# Patient Record
Sex: FEMALE | Race: WHITE | NOT HISPANIC OR LATINO | Employment: OTHER | URBAN - METROPOLITAN AREA
[De-identification: names, ages, dates, MRNs, and addresses within clinical notes are randomized per-mention and may not be internally consistent; named-entity substitution may affect disease eponyms.]

---

## 2017-06-15 DIAGNOSIS — Z12.31 ENCOUNTER FOR SCREENING MAMMOGRAM FOR BREAST CANCER: Primary | ICD-10-CM

## 2017-08-14 ENCOUNTER — HOSPITAL ENCOUNTER (OUTPATIENT)
Dept: RADIOLOGY | Facility: HOSPITAL | Age: 67
Discharge: HOME | End: 2017-08-14
Attending: NURSE PRACTITIONER
Payer: MEDICARE

## 2017-08-14 DIAGNOSIS — M79.672 LEFT FOOT PAIN: ICD-10-CM

## 2017-08-14 DIAGNOSIS — M79.672 LEFT FOOT PAIN: Primary | ICD-10-CM

## 2017-08-14 PROCEDURE — 73630 X-RAY EXAM OF FOOT: CPT | Mod: 26,LT,, | Performed by: RADIOLOGY

## 2017-08-14 PROCEDURE — 73630 X-RAY EXAM OF FOOT: CPT | Mod: TC,PO,LT

## 2018-01-18 DIAGNOSIS — N90.4 LICHEN SCLEROSUS ET ATROPHICUS OF THE VULVA: ICD-10-CM

## 2018-01-18 RX ORDER — CLOBETASOL PROPIONATE 0.5 MG/G
OINTMENT TOPICAL 2 TIMES DAILY
Qty: 60 G | Refills: 1 | Status: SHIPPED | OUTPATIENT
Start: 2018-01-18 | End: 2018-01-28

## 2018-04-05 ENCOUNTER — HOSPITAL ENCOUNTER (OUTPATIENT)
Dept: RADIOLOGY | Facility: HOSPITAL | Age: 68
Discharge: HOME | End: 2018-04-05
Attending: INTERNAL MEDICINE
Payer: MEDICARE

## 2018-04-05 DIAGNOSIS — Z01.818 PREOP EXAMINATION: Primary | ICD-10-CM

## 2018-04-05 DIAGNOSIS — Z01.818 PREOP EXAMINATION: ICD-10-CM

## 2018-04-05 PROCEDURE — 71046 X-RAY EXAM CHEST 2 VIEWS: CPT | Mod: 26,,, | Performed by: RADIOLOGY

## 2018-04-05 PROCEDURE — 71046 X-RAY EXAM CHEST 2 VIEWS: CPT | Mod: TC,PO

## 2018-06-06 ENCOUNTER — HOSPITAL ENCOUNTER (OUTPATIENT)
Dept: RADIOLOGY | Facility: HOSPITAL | Age: 68
Discharge: HOME | End: 2018-06-06
Attending: INTERNAL MEDICINE
Payer: MEDICARE

## 2018-06-06 DIAGNOSIS — M25.561 ACUTE PAIN OF RIGHT KNEE: ICD-10-CM

## 2018-06-06 DIAGNOSIS — M25.561 ACUTE PAIN OF RIGHT KNEE: Primary | ICD-10-CM

## 2018-06-06 PROCEDURE — 73562 X-RAY EXAM OF KNEE 3: CPT | Mod: 26,50,, | Performed by: RADIOLOGY

## 2018-06-06 PROCEDURE — 73562 X-RAY EXAM OF KNEE 3: CPT | Mod: TC,50,PO

## 2018-10-31 ENCOUNTER — HOSPITAL ENCOUNTER (OUTPATIENT)
Dept: RADIOLOGY | Facility: HOSPITAL | Age: 68
Discharge: HOME | End: 2018-10-31
Attending: INTERNAL MEDICINE
Payer: MEDICARE

## 2018-10-31 DIAGNOSIS — S49.92XA ARM INJURY, LEFT, INITIAL ENCOUNTER: ICD-10-CM

## 2018-10-31 PROCEDURE — 73090 X-RAY EXAM OF FOREARM: CPT | Mod: TC,PO,LT

## 2018-10-31 PROCEDURE — 73110 X-RAY EXAM OF WRIST: CPT | Mod: 26,LT,, | Performed by: RADIOLOGY

## 2018-10-31 PROCEDURE — 73090 X-RAY EXAM OF FOREARM: CPT | Mod: 26,LT,, | Performed by: RADIOLOGY

## 2018-10-31 PROCEDURE — 73110 X-RAY EXAM OF WRIST: CPT | Mod: TC,PO,LT

## 2018-10-31 PROCEDURE — 73080 X-RAY EXAM OF ELBOW: CPT | Mod: 26,LT,, | Performed by: RADIOLOGY

## 2018-10-31 PROCEDURE — 73080 X-RAY EXAM OF ELBOW: CPT | Mod: TC,PO,LT

## 2018-11-01 ENCOUNTER — TELEPHONE (OUTPATIENT)
Dept: OBSTETRICS AND GYNECOLOGY | Facility: CLINIC | Age: 68
End: 2018-11-01

## 2018-11-01 NOTE — TELEPHONE ENCOUNTER
Returned call to patient.  She is requesting a mammogram order.  Informed patient that she is due for a well woman exam and she could be given mammogram orders at that appointment, she verbalized understanding.  Encouraged her to schedule an appointment, she verbalized understanding and asked that appointment be scheduled.  Appointment scheduled for 12/17/18 at 9:00 am, appointment confirmed by patient.

## 2018-11-15 ENCOUNTER — TELEPHONE (OUTPATIENT)
Dept: OBSTETRICS AND GYNECOLOGY | Facility: CLINIC | Age: 68
End: 2018-11-15

## 2018-11-15 NOTE — TELEPHONE ENCOUNTER
Spoke to patient. Advised patient that Dr. Maria will be starting clinic later that morning due to surgery. Assisted patient with r/s appointment to 9:45. Patient verbalized understanding.

## 2018-12-17 ENCOUNTER — HOSPITAL ENCOUNTER (OUTPATIENT)
Dept: RADIOLOGY | Facility: HOSPITAL | Age: 68
Discharge: HOME | End: 2018-12-17
Attending: OBSTETRICS & GYNECOLOGY
Payer: MEDICARE

## 2018-12-17 VITALS — BODY MASS INDEX: 27.89 KG/M2 | HEIGHT: 68 IN | WEIGHT: 184 LBS

## 2018-12-17 DIAGNOSIS — Z12.39 BREAST CANCER SCREENING: Primary | ICD-10-CM

## 2018-12-17 DIAGNOSIS — Z12.39 BREAST CANCER SCREENING: ICD-10-CM

## 2018-12-17 PROCEDURE — 77063 BREAST TOMOSYNTHESIS BI: CPT | Mod: 26,,, | Performed by: RADIOLOGY

## 2018-12-17 PROCEDURE — 77067 SCR MAMMO BI INCL CAD: CPT | Mod: TC,PO

## 2018-12-17 PROCEDURE — 77063 BREAST TOMOSYNTHESIS BI: CPT | Mod: TC,PO

## 2018-12-17 PROCEDURE — 77067 SCR MAMMO BI INCL CAD: CPT | Mod: 26,,, | Performed by: RADIOLOGY

## 2018-12-30 ENCOUNTER — HOSPITAL ENCOUNTER (EMERGENCY)
Facility: HOSPITAL | Age: 68
Discharge: HOME | End: 2018-12-30
Attending: EMERGENCY MEDICINE
Payer: MEDICARE

## 2018-12-30 VITALS
DIASTOLIC BLOOD PRESSURE: 72 MMHG | SYSTOLIC BLOOD PRESSURE: 140 MMHG | WEIGHT: 163.38 LBS | BODY MASS INDEX: 24.76 KG/M2 | HEART RATE: 84 BPM | HEIGHT: 68 IN | RESPIRATION RATE: 18 BRPM | TEMPERATURE: 98 F | OXYGEN SATURATION: 97 %
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
## 2018-12-30 DIAGNOSIS — M25.552 HIP PAIN, LEFT: Primary | ICD-10-CM

## 2018-12-30 PROCEDURE — 99283 EMERGENCY DEPT VISIT LOW MDM: CPT

## 2018-12-30 RX ORDER — OXYCODONE AND ACETAMINOPHEN 5; 325 MG/1; MG/1
1 TABLET ORAL EVERY 4 HOURS PRN
Qty: 10 TABLET | Refills: 0 | Status: SHIPPED | OUTPATIENT
Start: 2018-12-30

## 2018-12-30 NOTE — ED NOTES
Patient sitting on ER stretcher. Patient denies pain at present. +pulses, + sensation to BLE. BBSCTA, skin warm and dry resp even and unlabored. Patient has an abrasion to left 4th digit and bruising to right knee. Pain to left knee and hip. Abd soft and non tender on palpation. Patient reports she can ambulate but does not like pain. Family at bedside Patient awaiting xray read and disposition. NAD noted Will continue to monitor.

## 2018-12-30 NOTE — ED PROVIDER NOTES
Encounter Date: 12/30/2018       History     Chief Complaint   Patient presents with    Hip Pain     left hip fx repair recent, fell 2 nights ago when left knee gave out and concerned with hip pain     The history is provided by the patient.   Hip Pain   This is a new problem. The current episode started 2 days ago. The problem occurs constantly. The problem has not changed since onset.Pertinent negatives include no chest pain, no abdominal pain, no headaches and no shortness of breath. The symptoms are aggravated by bending, twisting and walking. The symptoms are relieved by rest. She has tried rest for the symptoms. The treatment provided mild relief.     Review of patient's allergies indicates:  No Known Allergies  Past Medical History:   Diagnosis Date    Abnormal Pap smear of vagina     Anxiety     Anxiety and depression     Cancer     tongue    Mental disorder     Scoliosis      Past Surgical History:   Procedure Laterality Date    BLADDER SURGERY      CERVICAL SPINE SURGERY      feet Bilateral     HYSTERECTOMY      KNEE SURGERY      OOPHORECTOMY       Family History   Problem Relation Age of Onset    Heart disease Mother     Diabetes Mother     Hypertension Sister     Breast cancer Maternal Aunt     Cancer Maternal Uncle     Colon cancer Maternal Uncle     Hypertension Sister     Hypertension Daughter     Stroke Daughter     Hypertension Daughter     Ovarian cancer Neg Hx      Social History     Tobacco Use    Smoking status: Never Smoker    Smokeless tobacco: Never Used   Substance Use Topics    Alcohol use: No     Alcohol/week: 0.0 oz    Drug use: No     Review of Systems   Respiratory: Negative for shortness of breath.    Cardiovascular: Negative for chest pain.   Gastrointestinal: Negative for abdominal pain.   Musculoskeletal:        Left Hip Pain   Neurological: Negative for headaches.   All other systems reviewed and are negative.      Physical Exam     Initial Vitals  [12/30/18 1139]   BP Pulse Resp Temp SpO2   (!) 144/69 85 18 97.4 °F (36.3 °C) 96 %      MAP       --         Physical Exam    Nursing note and vitals reviewed.  Constitutional: She appears well-developed and well-nourished. No distress.   HENT:   Head: Normocephalic and atraumatic.   Mouth/Throat: Oropharynx is clear and moist.   Eyes: Conjunctivae and EOM are normal. Pupils are equal, round, and reactive to light.   Neck: Normal range of motion. Neck supple.   Cardiovascular: Normal rate, regular rhythm and normal heart sounds.   Pulmonary/Chest: Breath sounds normal. No respiratory distress.   Abdominal: Soft. Bowel sounds are normal. She exhibits no distension. There is no tenderness.   Musculoskeletal: Normal range of motion.        Left hip: She exhibits tenderness. She exhibits normal range of motion, normal strength, no swelling, no crepitus, no deformity and no laceration.   Neurological: She is alert and oriented to person, place, and time. She has normal strength.   Skin: Skin is warm and dry.   Psychiatric: She has a normal mood and affect. Thought content normal.         ED Course   Procedures  Labs Reviewed - No data to display       Imaging Results          X-Ray Hip 2 View Left (Final result)  Result time 12/30/18 12:43:00    Final result by Bravo Ames MD (12/30/18 12:43:00)                 Impression:      1.  Negative for acute process.    2.  Incidental findings as noted above.      Electronically signed by: Bravo Ames MD  Date:    12/30/2018  Time:    12:43             Narrative:    EXAMINATION:  XR HIP 2 VIEW LEFT    CLINICAL HISTORY:  Unspecified fall, initial encounter    TECHNIQUE:  AP view of the pelvis and frog leg lateral view of the left hip, as well as an AP view of the pelvis, were performed.    COMPARISON:  None    FINDINGS:  There is a left total hip arthroplasty device in place.  Hardware in surrounding osseous structures appear to be intact.  Right hip joint is well  maintained.  Moderate degenerative changes of the lower lumbar spine with convex right curvature.    Negative for fracture or dislocation.  Normal bowel gas pattern.  Left pelvic phlebolith.  Sacral bone island incidentally noted.                            1:01 PM - Counseling: Spoke with the patient and discussed todays findings, in addition to providing specific details for the plan of care and counseling regarding the diagnosis and prognosis. Questions are answered at this time.                             Clinical Impression:   The primary encounter diagnosis was Hip pain, left. A diagnosis of Fall was also pertinent to this visit.      Disposition:   Disposition: Discharged  Condition: Stable                        Ulysses Grimm MD  12/30/18 [id number]

## 2019-01-07 ENCOUNTER — OFFICE VISIT (OUTPATIENT)
Dept: OBSTETRICS AND GYNECOLOGY | Facility: CLINIC | Age: 69
End: 2019-01-07
Payer: MEDICARE

## 2019-01-07 VITALS — BODY MASS INDEX: 24.64 KG/M2 | WEIGHT: 162.06 LBS

## 2019-01-07 DIAGNOSIS — Z01.419 ENCOUNTER FOR GYNECOLOGICAL EXAMINATION WITHOUT ABNORMAL FINDING: Primary | ICD-10-CM

## 2019-01-07 PROCEDURE — 99999 PR PBB SHADOW E&M-EST. PATIENT-LVL II: CPT | Mod: PBBFAC,,, | Performed by: OBSTETRICS & GYNECOLOGY

## 2019-01-07 PROCEDURE — G0101 CA SCREEN;PELVIC/BREAST EXAM: HCPCS | Mod: S$GLB,,, | Performed by: OBSTETRICS & GYNECOLOGY

## 2019-01-07 PROCEDURE — G0101 PR CA SCREEN;PELVIC/BREAST EXAM: ICD-10-PCS | Mod: S$GLB,,, | Performed by: OBSTETRICS & GYNECOLOGY

## 2019-01-07 PROCEDURE — 99999 PR PBB SHADOW E&M-EST. PATIENT-LVL II: ICD-10-PCS | Mod: PBBFAC,,, | Performed by: OBSTETRICS & GYNECOLOGY

## 2019-01-07 RX ORDER — AMLODIPINE BESYLATE 5 MG/1
TABLET ORAL
COMMUNITY
Start: 2018-12-13

## 2019-01-07 NOTE — PROGRESS NOTES
CC: Well woman exam    Linda Segura is a 68 y.o. female  presents for a well woman exam.  No issues, problems, or complaints.  S/p ?colpocleisis w/ Dr. Strong 2018, done after 3 treatment of [facility] Church. Pt has fallen twice few months ago, broke her hip.    Past Medical History:   Diagnosis Date    Abnormal Pap smear of vagina     Anxiety     Anxiety and depression     Cancer     tongue    Mental disorder     Scoliosis      Past Surgical History:   Procedure Laterality Date    BLADDER SURGERY      CERVICAL SPINE SURGERY      feet Bilateral     HYSTERECTOMY      KNEE SURGERY      OOPHORECTOMY       Family History   Problem Relation Age of Onset    Heart disease Mother     Diabetes Mother     Hypertension Sister     Breast cancer Maternal Aunt     Cancer Maternal Uncle     Colon cancer Maternal Uncle     Hypertension Sister     Hypertension Daughter     Stroke Daughter     Hypertension Daughter     Ovarian cancer Neg Hx      Social History     Tobacco Use    Smoking status: Never Smoker    Smokeless tobacco: Never Used   Substance Use Topics    Alcohol use: No     Alcohol/week: 0.0 oz    Drug use: No     OB History      Para Term  AB Living    4 4 4          SAB TAB Ectopic Multiple Live Births                       Current Outpatient Medications:     alprazolam (XANAX) 1 MG tablet, Take 1 tablet (1 mg total) by mouth 2 (two) times daily as needed for Anxiety., Disp: 60 tablet, Rfl: 0    amLODIPine (NORVASC) 5 MG tablet, , Disp: , Rfl:     aspirin (ECOTRIN) 81 MG EC tablet, Take 81 mg by mouth once daily., Disp: , Rfl:     cholecalciferol, vitamin D3, 10,000 unit Cap, Take 1 capsule by mouth., Disp: , Rfl:     dicyclomine (BENTYL) 10 MG capsule, Take 1 capsule by mouth 2 (two) times daily., Disp: , Rfl: 3    oxyCODONE-acetaminophen (PERCOCET) 5-325 mg per tablet, Take 1 tablet by mouth every 4 (four) hours as needed for Pain., Disp: 10 tablet, Rfl: 0     zolpidem (AMBIEN) 10 mg Tab, Take 10 mg by mouth nightly as needed., Disp: , Rfl:     baclofen (LIORESAL) 10 MG tablet, Take 10 mg by mouth., Disp: , Rfl:     clobetasol 0.05% (TEMOVATE) 0.05 % Oint, APPLY TOPICALLY 2 (TWO) TIMES DAILY., Disp: 60 g, Rfl: 1    pantoprazole (PROTONIX) 40 MG tablet, Take 40 mg by mouth., Disp: , Rfl:     GYNECOLOGY HISTORY:  No abnormal pap/std    DATA REVIEWED:  Last pap: no abnormal history   Last mmg: normal Date: 12/2018  DEXA: scheduled  Colonoscopy 2015    Wt 73.5 kg (162 lb 0.6 oz)   BMI 24.64 kg/m²     ROS:  GENERAL: Denies weight gain or weight loss. Feeling well overall.   SKIN: Denies rash or lesions.   HEAD: Denies head injury or headache.   NODES: Denies enlarged lymph nodes.   CHEST: Denies chest pain or shortness of breath.   CARDIOVASCULAR: Denies palpitations or left sided chest pain.   ABDOMEN: No abdominal pain, constipation, diarrhea, nausea, vomiting or rectal bleeding.   URINARY: No frequency, dysuria, hematuria, or burning on urination.  REPRODUCTIVE: See HPI.   BREASTS: The patient denies pain, lumps, or nipple discharge.   HEMATOLOGIC: No easy bruisability or excessive bleeding.   MUSCULOSKELETAL: Denies joint pain or swelling.   NEUROLOGIC: Denies syncope or weakness.   PSYCHIATRIC: Denies depression, anxiety or mood swings.    PE:   APPEARANCE: Well nourished, well developed, in no acute distress.  AFFECT: WNL, alert and oriented x 3.  SKIN: No acne or hirsutism.  NECK: Neck symmetric without masses or thyromegaly.  NODES: No inguinal, cervical, axillary or femoral lymph node enlargement.  CHEST: Good respiratory effort.   ABDOMEN: Soft. No tenderness or masses. No hepatosplenomegaly. No hernias.  BREASTS: Symmetrical, no skin changes or visible lesions. No palpable masses, nipple discharge bilaterally.  PELVIC: Normal external female genitalia without lesions. Normal hair distribution. Adequate perineal body, normal urethral meatus. Not able to insert  speculum. Digital exam shows tract bilateral vaginal canal w/ midline closed.    EXTREMITIES: No edema.    Encounter for gynecological examination without abnormal finding    Patient was counseled today on A.C.S. Pap guidelines (none needed) and recommendations for yearly pelvic exams, yearly mammograms starting age 40, and clinical breast exams; to see her PCP for other health maintenance.

## 2019-05-13 ENCOUNTER — HOSPITAL ENCOUNTER (EMERGENCY)
Facility: HOSPITAL | Age: 69
Discharge: HOME | End: 2019-05-13
Attending: EMERGENCY MEDICINE
Payer: MEDICARE

## 2019-05-13 VITALS
OXYGEN SATURATION: 94 % | SYSTOLIC BLOOD PRESSURE: 123 MMHG | WEIGHT: 160.63 LBS | HEART RATE: 78 BPM | TEMPERATURE: 99 F | BODY MASS INDEX: 24.42 KG/M2 | DIASTOLIC BLOOD PRESSURE: 59 MMHG | RESPIRATION RATE: 18 BRPM

## 2019-05-13 DIAGNOSIS — R10.9 FLANK PAIN: ICD-10-CM

## 2019-05-13 DIAGNOSIS — N20.1 URETEROLITHIASIS: Primary | ICD-10-CM

## 2019-05-13 DIAGNOSIS — R31.9 HEMATURIA, UNSPECIFIED TYPE: ICD-10-CM

## 2019-05-13 LAB
ALBUMIN SERPL BCP-MCNC: 4 G/DL (ref 3.5–5.2)
ALP SERPL-CCNC: 77 U/L (ref 55–135)
ALT SERPL W/O P-5'-P-CCNC: 10 U/L (ref 10–44)
ANION GAP SERPL CALC-SCNC: 7 MMOL/L (ref 8–16)
AST SERPL-CCNC: 14 U/L (ref 10–40)
BACTERIA #/AREA URNS AUTO: ABNORMAL /HPF
BASOPHILS # BLD AUTO: 0.05 K/UL (ref 0–0.2)
BASOPHILS NFR BLD: 0.6 % (ref 0–1.9)
BILIRUB SERPL-MCNC: 0.6 MG/DL (ref 0.1–1)
BILIRUB UR QL STRIP: NEGATIVE
BUN SERPL-MCNC: 13 MG/DL (ref 8–23)
CALCIUM SERPL-MCNC: 10.2 MG/DL (ref 8.7–10.5)
CHLORIDE SERPL-SCNC: 103 MMOL/L (ref 95–110)
CLARITY UR REFRACT.AUTO: CLEAR
CO2 SERPL-SCNC: 32 MMOL/L (ref 23–29)
COLOR UR AUTO: ABNORMAL
CREAT SERPL-MCNC: 0.8 MG/DL (ref 0.5–1.4)
DIFFERENTIAL METHOD: ABNORMAL
EOSINOPHIL # BLD AUTO: 0.1 K/UL (ref 0–0.5)
EOSINOPHIL NFR BLD: 1.6 % (ref 0–8)
ERYTHROCYTE [DISTWIDTH] IN BLOOD BY AUTOMATED COUNT: 12.9 % (ref 11.5–14.5)
EST. GFR  (AFRICAN AMERICAN): >60 ML/MIN/1.73 M^2
EST. GFR  (NON AFRICAN AMERICAN): >60 ML/MIN/1.73 M^2
GLUCOSE SERPL-MCNC: 124 MG/DL (ref 70–110)
GLUCOSE UR QL STRIP: NEGATIVE
HCT VFR BLD AUTO: 42.8 % (ref 37–48.5)
HGB BLD-MCNC: 14.6 G/DL (ref 12–16)
HGB UR QL STRIP: ABNORMAL
HYALINE CASTS UR QL AUTO: 0 /LPF
KETONES UR QL STRIP: NEGATIVE
LEUKOCYTE ESTERASE UR QL STRIP: NEGATIVE
LYMPHOCYTES # BLD AUTO: 1.1 K/UL (ref 1–4.8)
LYMPHOCYTES NFR BLD: 12.6 % (ref 18–48)
MCH RBC QN AUTO: 29.6 PG (ref 27–31)
MCHC RBC AUTO-ENTMCNC: 34.1 G/DL (ref 32–36)
MCV RBC AUTO: 87 FL (ref 82–98)
MICROSCOPIC COMMENT: ABNORMAL
MONOCYTES # BLD AUTO: 0.8 K/UL (ref 0.3–1)
MONOCYTES NFR BLD: 9.6 % (ref 4–15)
NEUTROPHILS # BLD AUTO: 6.3 K/UL (ref 1.8–7.7)
NEUTROPHILS NFR BLD: 75.5 % (ref 38–73)
NITRITE UR QL STRIP: NEGATIVE
PH UR STRIP: 7 [PH] (ref 5–8)
PLATELET # BLD AUTO: 205 K/UL (ref 150–350)
PMV BLD AUTO: 10.2 FL (ref 9.2–12.9)
POTASSIUM SERPL-SCNC: 3.6 MMOL/L (ref 3.5–5.1)
PROT SERPL-MCNC: 6.9 G/DL (ref 6–8.4)
PROT UR QL STRIP: ABNORMAL
RBC # BLD AUTO: 4.93 M/UL (ref 4–5.4)
RBC #/AREA URNS AUTO: 15 /HPF (ref 0–4)
SODIUM SERPL-SCNC: 142 MMOL/L (ref 136–145)
SP GR UR STRIP: >=1.03 (ref 1–1.03)
SQUAMOUS #/AREA URNS AUTO: 4 /HPF
URN SPEC COLLECT METH UR: ABNORMAL
UROBILINOGEN UR STRIP-ACNC: NEGATIVE EU/DL
WBC # BLD AUTO: 8.35 K/UL (ref 3.9–12.7)
WBC #/AREA URNS AUTO: 5 /HPF (ref 0–5)

## 2019-05-13 PROCEDURE — 96374 THER/PROPH/DIAG INJ IV PUSH: CPT | Mod: ER

## 2019-05-13 PROCEDURE — 96375 TX/PRO/DX INJ NEW DRUG ADDON: CPT | Mod: ER

## 2019-05-13 PROCEDURE — 99284 EMERGENCY DEPT VISIT MOD MDM: CPT | Mod: 25,ER

## 2019-05-13 PROCEDURE — 85025 COMPLETE CBC W/AUTO DIFF WBC: CPT | Mod: ER

## 2019-05-13 PROCEDURE — 81000 URINALYSIS NONAUTO W/SCOPE: CPT | Mod: ER

## 2019-05-13 PROCEDURE — 80053 COMPREHEN METABOLIC PANEL: CPT | Mod: ER

## 2019-05-13 PROCEDURE — 63600175 PHARM REV CODE 636 W HCPCS: Mod: ER | Performed by: EMERGENCY MEDICINE

## 2019-05-13 RX ORDER — KETOROLAC TROMETHAMINE 30 MG/ML
15 INJECTION, SOLUTION INTRAMUSCULAR; INTRAVENOUS
Status: COMPLETED | OUTPATIENT
Start: 2019-05-13 | End: 2019-05-13

## 2019-05-13 RX ORDER — ONDANSETRON 2 MG/ML
4 INJECTION INTRAMUSCULAR; INTRAVENOUS
Status: COMPLETED | OUTPATIENT
Start: 2019-05-13 | End: 2019-05-13

## 2019-05-13 RX ORDER — KETOROLAC TROMETHAMINE 10 MG/1
10 TABLET, FILM COATED ORAL EVERY 6 HOURS PRN
Qty: 10 TABLET | Refills: 0 | Status: SHIPPED | OUTPATIENT
Start: 2019-05-13

## 2019-05-13 RX ORDER — PROMETHAZINE HYDROCHLORIDE 25 MG/1
25 TABLET ORAL EVERY 6 HOURS PRN
Qty: 15 TABLET | Refills: 0 | Status: SHIPPED | OUTPATIENT
Start: 2019-05-13

## 2019-05-13 RX ORDER — CEFUROXIME AXETIL 500 MG/1
500 TABLET ORAL 2 TIMES DAILY
Qty: 20 TABLET | Refills: 0 | Status: SHIPPED | OUTPATIENT
Start: 2019-05-13 | End: 2019-05-23

## 2019-05-13 RX ORDER — HYDROCODONE BITARTRATE AND ACETAMINOPHEN 5; 325 MG/1; MG/1
1 TABLET ORAL EVERY 4 HOURS PRN
Qty: 11 TABLET | Refills: 0 | Status: SHIPPED | OUTPATIENT
Start: 2019-05-13 | End: 2019-05-23

## 2019-05-13 RX ORDER — MORPHINE SULFATE 4 MG/ML
2 INJECTION, SOLUTION INTRAMUSCULAR; INTRAVENOUS
Status: COMPLETED | OUTPATIENT
Start: 2019-05-13 | End: 2019-05-13

## 2019-05-13 RX ADMIN — ONDANSETRON 4 MG: 2 INJECTION INTRAMUSCULAR; INTRAVENOUS at 02:05

## 2019-05-13 RX ADMIN — MORPHINE SULFATE 2 MG: 4 INJECTION INTRAVENOUS at 02:05

## 2019-05-13 RX ADMIN — KETOROLAC TROMETHAMINE 15 MG: 30 INJECTION, SOLUTION INTRAMUSCULAR; INTRAVENOUS at 03:05

## 2019-05-13 NOTE — ED PROVIDER NOTES
Encounter Date: 5/13/2019       History     Chief Complaint   Patient presents with    Abdominal Pain     right flank, RLQ pain, pressure with urination     The history is provided by the patient.   Abdominal Pain   The current episode started several days ago. The onset of the illness was gradual. The abdominal pain is located in the suprapubic region and right flank. The abdominal pain does not radiate. The other symptoms of the illness include dysuria. The other symptoms of the illness do not include fever, shortness of breath, nausea, vomiting, diarrhea or vaginal discharge.   Symptoms associated with the illness do not include back pain.     Review of patient's allergies indicates:  No Known Allergies  Past Medical History:   Diagnosis Date    Abnormal Pap smear of vagina     Anxiety     Anxiety and depression     Cancer     tongue    Mental disorder     Scoliosis      Past Surgical History:   Procedure Laterality Date    BLADDER SURGERY      CERVICAL SPINE SURGERY      colpocleisis  04/2018    Dr. Strong    feet Bilateral     HYSTERECTOMY      severe endometriosis    KNEE SURGERY      OOPHORECTOMY Bilateral     severe endometriosis     Family History   Problem Relation Age of Onset    Heart disease Mother     Diabetes Mother     Hypertension Sister     Breast cancer Maternal Aunt     Cancer Maternal Uncle     Colon cancer Maternal Uncle     Hypertension Sister     Hypertension Daughter     Stroke Daughter     Hypertension Daughter     Ovarian cancer Neg Hx      Social History     Tobacco Use    Smoking status: Never Smoker    Smokeless tobacco: Never Used   Substance Use Topics    Alcohol use: No     Alcohol/week: 0.0 oz    Drug use: No     Review of Systems   Constitutional: Negative for fever.   HENT: Negative for sore throat.    Respiratory: Negative for shortness of breath.    Cardiovascular: Negative for chest pain.   Gastrointestinal: Positive for abdominal pain. Negative  for diarrhea, nausea and vomiting.   Genitourinary: Positive for dysuria. Negative for vaginal discharge.   Musculoskeletal: Negative for back pain.   Skin: Negative for rash.   Neurological: Negative for weakness.   Hematological: Does not bruise/bleed easily.       Physical Exam     Initial Vitals [05/13/19 1415]   BP Pulse Resp Temp SpO2   138/71 95 18 98.5 °F (36.9 °C) 95 %      MAP       --         Physical Exam    Nursing note and vitals reviewed.  Constitutional: She appears well-developed and well-nourished. No distress.   HENT:   Head: Normocephalic and atraumatic.   Mouth/Throat: Oropharynx is clear and moist.   Eyes: Conjunctivae and EOM are normal. Pupils are equal, round, and reactive to light.   Neck: Normal range of motion. Neck supple.   Cardiovascular: Normal rate, regular rhythm and normal heart sounds. Exam reveals no gallop and no friction rub.    No murmur heard.  Pulmonary/Chest: Breath sounds normal. No respiratory distress. She has no wheezes. She has no rhonchi. She has no rales.   Abdominal: Soft. Bowel sounds are normal. She exhibits no distension and no mass. There is no tenderness. There is no rebound and no guarding.   Musculoskeletal: Normal range of motion. She exhibits no edema or tenderness.   Neurological: She is alert and oriented to person, place, and time. She has normal strength.   Skin: Skin is warm and dry. No rash noted.   Psychiatric: She has a normal mood and affect. Thought content normal.         ED Course   Procedures  Labs Reviewed   CBC W/ AUTO DIFFERENTIAL - Abnormal; Notable for the following components:       Result Value    Gran% 75.5 (*)     Lymph% 12.6 (*)     All other components within normal limits   COMPREHENSIVE METABOLIC PANEL - Abnormal; Notable for the following components:    CO2 32 (*)     Glucose 124 (*)     Anion Gap 7 (*)     All other components within normal limits   URINALYSIS, REFLEX TO URINE CULTURE - Abnormal; Notable for the following  components:    Color, UA Green (*)     Specific Gravity, UA >=1.030 (*)     Protein, UA 2+ (*)     Occult Blood UA 2+ (*)     All other components within normal limits    Narrative:     Preferred Collection Type->Urine, Clean Catch   URINALYSIS MICROSCOPIC - Abnormal; Notable for the following components:    RBC, UA 15 (*)     All other components within normal limits    Narrative:     Preferred Collection Type->Urine, Clean Catch          Imaging Results          CT Renal Stone Study ABD Pelvis WO (Final result)  Result time 05/13/19 14:57:45    Final result by Melinda Corral MD (05/13/19 14:57:45)                 Impression:      Right hydronephrosis and hydroureter with a calculus in the urinary bladder suggesting a recently passed stone.    Additional 1 minimally calculus in the region of the right UVJ is favored to represent a phlebolith.    All CT scans at this facility use dose modulation, iterative reconstruction and/or weight based dosing when appropriate to reduce radiation dose to as low as reasonably achievable.      Electronically signed by: Melinda Corral MD  Date:    05/13/2019  Time:    14:57             Narrative:    EXAMINATION:  CT RENAL STONE STUDY ABD PELVIS WO    CLINICAL HISTORY:  Flank pain, stone disease suspected;    TECHNIQUE:  Low dose axial images, sagittal and coronal reformations were obtained from the lung bases to the pubic symphysis.  Contrast was not administered.    COMPARISON:  None    FINDINGS:  Heart: Normal in size. No pericardial effusion.    Lung Bases: Well aerated, without consolidation or pleural fluid.    Liver: Normal in size and attenuation.  There are a few scattered subcentimeter low-density hepatic lesions, too small to accurately characterize.    Gallbladder: No calcified gallstones.    Bile Ducts: No evidence of dilated ducts.    Pancreas: No mass or peripancreatic fat stranding.    Spleen: Unremarkable.    Adrenals: Unremarkable.    Kidneys/  Ureters: There is moderate right hydronephrosis and hydroureter.  A 5 mm calculus is noted within the urinary bladder suggesting a recently passed stone.  There is also a 1 mm calculus in the region of the right UVJ that is favored to represent a phlebolith.  The left kidney is within normal limits.    Bladder: No evidence of wall thickening.    Reproductive organs: Unremarkable.    GI Tract/Mesentery: No evidence of bowel obstruction or inflammation.  Appendix is normal.    Peritoneal Space: No ascites. No free air.    Retroperitoneum: No significant adenopathy.    Abdominal wall: Unremarkable.    Vasculature: No significant atherosclerosis or aneurysm.    Bones: There has been a left hip replacement.  There are no acute or aggressive appearing osseous abnormalities.                                        ED Vital Signs:  Vitals:    05/13/19 1415   BP: 138/71   Pulse: 95   Resp: 18   Temp: 98.5 °F (36.9 °C)   TempSrc: Oral   SpO2: 95%   Weight: 72.8 kg (160 lb 9.7 oz)         Abnormal Lab Results:  Labs Reviewed   CBC W/ AUTO DIFFERENTIAL - Abnormal; Notable for the following components:       Result Value    Gran% 75.5 (*)     Lymph% 12.6 (*)     All other components within normal limits   COMPREHENSIVE METABOLIC PANEL - Abnormal; Notable for the following components:    CO2 32 (*)     Glucose 124 (*)     Anion Gap 7 (*)     All other components within normal limits   URINALYSIS, REFLEX TO URINE CULTURE - Abnormal; Notable for the following components:    Color, UA Green (*)     Specific Gravity, UA >=1.030 (*)     Protein, UA 2+ (*)     Occult Blood UA 2+ (*)     All other components within normal limits    Narrative:     Preferred Collection Type->Urine, Clean Catch   URINALYSIS MICROSCOPIC - Abnormal; Notable for the following components:    RBC, UA 15 (*)     All other components within normal limits    Narrative:     Preferred Collection Type->Urine, Clean Catch          All Lab Results:  Results for orders  placed or performed during the hospital encounter of 05/13/19   CBC auto differential   Result Value Ref Range    WBC 8.35 3.90 - 12.70 K/uL    RBC 4.93 4.00 - 5.40 M/uL    Hemoglobin 14.6 12.0 - 16.0 g/dL    Hematocrit 42.8 37.0 - 48.5 %    Mean Corpuscular Volume 87 82 - 98 fL    Mean Corpuscular Hemoglobin 29.6 27.0 - 31.0 pg    Mean Corpuscular Hemoglobin Conc 34.1 32.0 - 36.0 g/dL    RDW 12.9 11.5 - 14.5 %    Platelets 205 150 - 350 K/uL    MPV 10.2 9.2 - 12.9 fL    Gran # (ANC) 6.3 1.8 - 7.7 K/uL    Lymph # 1.1 1.0 - 4.8 K/uL    Mono # 0.8 0.3 - 1.0 K/uL    Eos # 0.1 0.0 - 0.5 K/uL    Baso # 0.05 0.00 - 0.20 K/uL    Gran% 75.5 (H) 38.0 - 73.0 %    Lymph% 12.6 (L) 18.0 - 48.0 %    Mono% 9.6 4.0 - 15.0 %    Eosinophil% 1.6 0.0 - 8.0 %    Basophil% 0.6 0.0 - 1.9 %    Differential Method Automated    Comprehensive metabolic panel   Result Value Ref Range    Sodium 142 136 - 145 mmol/L    Potassium 3.6 3.5 - 5.1 mmol/L    Chloride 103 95 - 110 mmol/L    CO2 32 (H) 23 - 29 mmol/L    Glucose 124 (H) 70 - 110 mg/dL    BUN, Bld 13 8 - 23 mg/dL    Creatinine 0.8 0.5 - 1.4 mg/dL    Calcium 10.2 8.7 - 10.5 mg/dL    Total Protein 6.9 6.0 - 8.4 g/dL    Albumin 4.0 3.5 - 5.2 g/dL    Total Bilirubin 0.6 0.1 - 1.0 mg/dL    Alkaline Phosphatase 77 55 - 135 U/L    AST 14 10 - 40 U/L    ALT 10 10 - 44 U/L    Anion Gap 7 (L) 8 - 16 mmol/L    eGFR if African American >60.0 >60 mL/min/1.73 m^2    eGFR if non African American >60.0 >60 mL/min/1.73 m^2   Urinalysis, Reflex to Urine Culture Urine, Clean Catch   Result Value Ref Range    Specimen UA Urine, Clean Catch     Color, UA Green (A) Yellow, Straw, Lorna    Appearance, UA Clear Clear    pH, UA 7.0 5.0 - 8.0    Specific Gravity, UA >=1.030 (A) 1.005 - 1.030    Protein, UA 2+ (A) Negative    Glucose, UA Negative Negative    Ketones, UA Negative Negative    Bilirubin (UA) Negative Negative    Occult Blood UA 2+ (A) Negative    Nitrite, UA Negative Negative    Urobilinogen, UA  Negative <2.0 EU/dL    Leukocytes, UA Negative Negative   Urinalysis Microscopic   Result Value Ref Range    RBC, UA 15 (H) 0 - 4 /hpf    WBC, UA 5 0 - 5 /hpf    Bacteria Rare None-Occ /hpf    Squam Epithel, UA 4 /hpf    Hyaline Casts, UA 0 0-1/lpf /lpf    Microscopic Comment SEE COMMENT            Imaging Results:  Imaging Results          CT Renal Stone Study ABD Pelvis WO (Final result)  Result time 05/13/19 14:57:45    Final result by Melinda Corral MD (05/13/19 14:57:45)                 Impression:      Right hydronephrosis and hydroureter with a calculus in the urinary bladder suggesting a recently passed stone.    Additional 1 minimally calculus in the region of the right UVJ is favored to represent a phlebolith.    All CT scans at this facility use dose modulation, iterative reconstruction and/or weight based dosing when appropriate to reduce radiation dose to as low as reasonably achievable.      Electronically signed by: Melinda Corral MD  Date:    05/13/2019  Time:    14:57             Narrative:    EXAMINATION:  CT RENAL STONE STUDY ABD PELVIS WO    CLINICAL HISTORY:  Flank pain, stone disease suspected;    TECHNIQUE:  Low dose axial images, sagittal and coronal reformations were obtained from the lung bases to the pubic symphysis.  Contrast was not administered.    COMPARISON:  None    FINDINGS:  Heart: Normal in size. No pericardial effusion.    Lung Bases: Well aerated, without consolidation or pleural fluid.    Liver: Normal in size and attenuation.  There are a few scattered subcentimeter low-density hepatic lesions, too small to accurately characterize.    Gallbladder: No calcified gallstones.    Bile Ducts: No evidence of dilated ducts.    Pancreas: No mass or peripancreatic fat stranding.    Spleen: Unremarkable.    Adrenals: Unremarkable.    Kidneys/ Ureters: There is moderate right hydronephrosis and hydroureter.  A 5 mm calculus is noted within the urinary bladder suggesting a  recently passed stone.  There is also a 1 mm calculus in the region of the right UVJ that is favored to represent a phlebolith.  The left kidney is within normal limits.    Bladder: No evidence of wall thickening.    Reproductive organs: Unremarkable.    GI Tract/Mesentery: No evidence of bowel obstruction or inflammation.  Appendix is normal.    Peritoneal Space: No ascites. No free air.    Retroperitoneum: No significant adenopathy.    Abdominal wall: Unremarkable.    Vasculature: No significant atherosclerosis or aneurysm.    Bones: There has been a left hip replacement.  There are no acute or aggressive appearing osseous abnormalities.                                   The Emergency Provider reviewed the vital signs and test results, which are outlined above.    ED Discussions:  3:08 PM: Reassessed pt at this time.  Pt states her condition has improved at this time. Discussed with pt all pertinent ED information and results. Discussed pt dx of kidney stone and plan of tx. Gave pt all f/u and return to the ED instructions. All questions and concerns were addressed at this time. Pt expresses understanding of information and instructions, and is comfortable with plan to discharge. Pt is stable for discharge.                           Clinical Impression:       ICD-10-CM ICD-9-CM   1. Ureterolithiasis N20.1 592.1   2. Flank pain R10.9 789.09   3. Hematuria, unspecified type R31.9 599.70         Disposition:   Disposition: Discharged  Condition: Stable                        Man Hassan MD  05/13/19 [id number]

## 2020-02-04 ENCOUNTER — HOSPITAL ENCOUNTER (OUTPATIENT)
Dept: RADIOLOGY | Facility: HOSPITAL | Age: 70
Discharge: HOME | End: 2020-02-04
Attending: INTERNAL MEDICINE
Payer: MEDICARE

## 2020-02-04 DIAGNOSIS — M54.50 LOW BACK PAIN: ICD-10-CM

## 2020-02-04 PROCEDURE — 72100 XR LUMBAR SPINE AP AND LATERAL: ICD-10-PCS | Mod: 26,,, | Performed by: RADIOLOGY

## 2020-02-04 PROCEDURE — 72100 X-RAY EXAM L-S SPINE 2/3 VWS: CPT | Mod: 26,,, | Performed by: RADIOLOGY

## 2020-02-04 PROCEDURE — 72100 X-RAY EXAM L-S SPINE 2/3 VWS: CPT | Mod: TC,PO

## 2020-08-17 ENCOUNTER — HOSPITAL ENCOUNTER (OUTPATIENT)
Dept: RADIOLOGY | Facility: HOSPITAL | Age: 70
Discharge: HOME | End: 2020-08-17
Attending: NURSE PRACTITIONER
Payer: MEDICARE

## 2020-08-17 DIAGNOSIS — M15.9 GENERALIZED OSTEOARTHROSIS, INVOLVING MULTIPLE SITES: ICD-10-CM

## 2020-08-17 DIAGNOSIS — M51.9 INTERVERTEBRAL DISC DISORDER: Primary | ICD-10-CM

## 2020-08-17 DIAGNOSIS — M51.9 INTERVERTEBRAL DISC DISORDER: ICD-10-CM

## 2020-08-17 PROCEDURE — 72100 XR LUMBAR SPINE 2 OR 3 VIEWS: ICD-10-PCS | Mod: 26,,, | Performed by: RADIOLOGY

## 2020-08-17 PROCEDURE — 73521 X-RAY EXAM HIPS BI 2 VIEWS: CPT | Mod: 26,,, | Performed by: RADIOLOGY

## 2020-08-17 PROCEDURE — 72100 X-RAY EXAM L-S SPINE 2/3 VWS: CPT | Mod: 26,,, | Performed by: RADIOLOGY

## 2020-08-17 PROCEDURE — 72100 X-RAY EXAM L-S SPINE 2/3 VWS: CPT | Mod: TC,PO

## 2020-08-17 PROCEDURE — 73521 X-RAY EXAM HIPS BI 2 VIEWS: CPT | Mod: TC,PO

## 2020-08-17 PROCEDURE — 73521 XR HIPS BILATERAL 2 VIEW INCL AP PELVIS: ICD-10-PCS | Mod: 26,,, | Performed by: RADIOLOGY

## 2021-10-18 ENCOUNTER — TELEPHONE (OUTPATIENT)
Dept: OBSTETRICS AND GYNECOLOGY | Facility: CLINIC | Age: 71
End: 2021-10-18

## 2021-10-18 DIAGNOSIS — Z12.31 ENCOUNTER FOR SCREENING MAMMOGRAM FOR MALIGNANT NEOPLASM OF BREAST: Primary | ICD-10-CM

## 2021-12-06 ENCOUNTER — HOSPITAL ENCOUNTER (OUTPATIENT)
Dept: RADIOLOGY | Facility: HOSPITAL | Age: 71
Discharge: HOME | End: 2021-12-06
Attending: OBSTETRICS & GYNECOLOGY
Payer: MEDICARE

## 2021-12-06 VITALS — HEIGHT: 68 IN | WEIGHT: 160.5 LBS | BODY MASS INDEX: 24.32 KG/M2

## 2021-12-06 DIAGNOSIS — Z12.31 ENCOUNTER FOR SCREENING MAMMOGRAM FOR MALIGNANT NEOPLASM OF BREAST: ICD-10-CM

## 2021-12-06 PROCEDURE — 77063 MAMMO DIGITAL SCREENING BILAT WITH TOMO: ICD-10-PCS | Mod: 26,,, | Performed by: RADIOLOGY

## 2021-12-06 PROCEDURE — 77067 SCR MAMMO BI INCL CAD: CPT | Mod: 26,,, | Performed by: RADIOLOGY

## 2021-12-06 PROCEDURE — 77063 BREAST TOMOSYNTHESIS BI: CPT | Mod: 26,,, | Performed by: RADIOLOGY

## 2021-12-06 PROCEDURE — 77067 SCR MAMMO BI INCL CAD: CPT | Mod: TC,PO

## 2021-12-06 PROCEDURE — 77067 MAMMO DIGITAL SCREENING BILAT WITH TOMO: ICD-10-PCS | Mod: 26,,, | Performed by: RADIOLOGY

## 2022-06-06 ENCOUNTER — HOSPITAL ENCOUNTER (OUTPATIENT)
Dept: RADIOLOGY | Facility: HOSPITAL | Age: 72
Discharge: HOME | End: 2022-06-06
Attending: INTERNAL MEDICINE
Payer: MEDICARE
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
## 2022-06-06 DIAGNOSIS — G89.29 CHRONIC ARTHRALGIAS OF KNEES AND HIPS: ICD-10-CM

## 2022-06-06 DIAGNOSIS — M25.561 CHRONIC ARTHRALGIAS OF KNEES AND HIPS: ICD-10-CM

## 2022-06-06 DIAGNOSIS — M25.552 LEFT HIP PAIN: ICD-10-CM

## 2022-06-06 DIAGNOSIS — M25.552 CHRONIC ARTHRALGIAS OF KNEES AND HIPS: ICD-10-CM

## 2022-06-06 DIAGNOSIS — M25.562 LEFT KNEE PAIN: ICD-10-CM

## 2022-06-06 DIAGNOSIS — M25.562 CHRONIC ARTHRALGIAS OF KNEES AND HIPS: ICD-10-CM

## 2022-06-06 DIAGNOSIS — M25.552 LEFT HIP PAIN: Primary | ICD-10-CM

## 2022-06-06 DIAGNOSIS — M25.551 CHRONIC ARTHRALGIAS OF KNEES AND HIPS: ICD-10-CM

## 2022-06-06 PROCEDURE — 73502 X-RAY EXAM HIP UNI 2-3 VIEWS: CPT | Mod: 26,LT,, | Performed by: RADIOLOGY

## 2022-06-06 PROCEDURE — 72100 XR LUMBAR SPINE AP AND LATERAL: ICD-10-PCS | Mod: 26,,, | Performed by: RADIOLOGY

## 2022-06-06 PROCEDURE — 72100 X-RAY EXAM L-S SPINE 2/3 VWS: CPT | Mod: 26,,, | Performed by: RADIOLOGY

## 2022-06-06 PROCEDURE — 73562 X-RAY EXAM OF KNEE 3: CPT | Mod: TC,PO,LT

## 2022-06-06 PROCEDURE — 73502 XR HIP WITH PELVIS WHEN PERFORMED, 2 OR 3 VIEWS LEFT: ICD-10-PCS | Mod: 26,LT,, | Performed by: RADIOLOGY

## 2022-06-06 PROCEDURE — 73562 X-RAY EXAM OF KNEE 3: CPT | Mod: 26,LT,, | Performed by: RADIOLOGY

## 2022-06-06 PROCEDURE — 73502 X-RAY EXAM HIP UNI 2-3 VIEWS: CPT | Mod: TC,PO,LT

## 2022-06-06 PROCEDURE — 73560 X-RAY EXAM OF KNEE 1 OR 2: CPT | Mod: TC,PO,RT

## 2022-06-06 PROCEDURE — 73562 XR KNEE ORTHO LEFT: ICD-10-PCS | Mod: 26,LT,, | Performed by: RADIOLOGY

## 2022-06-06 PROCEDURE — 72100 X-RAY EXAM L-S SPINE 2/3 VWS: CPT | Mod: TC,PO

## 2022-06-06 PROCEDURE — 73560 X-RAY EXAM OF KNEE 1 OR 2: CPT | Mod: 26,RT,, | Performed by: RADIOLOGY

## 2022-06-06 PROCEDURE — 73560 XR KNEE ORTHO LEFT: ICD-10-PCS | Mod: 26,RT,, | Performed by: RADIOLOGY

## 2024-05-19 ENCOUNTER — HOSPITAL ENCOUNTER (EMERGENCY)
Facility: HOSPITAL | Age: 74
Discharge: HOME | End: 2024-05-19
Attending: EMERGENCY MEDICINE
Payer: MEDICARE

## 2024-05-19 VITALS
TEMPERATURE: 99 F | SYSTOLIC BLOOD PRESSURE: 129 MMHG | DIASTOLIC BLOOD PRESSURE: 68 MMHG | WEIGHT: 165 LBS | OXYGEN SATURATION: 97 % | HEIGHT: 67 IN | BODY MASS INDEX: 25.9 KG/M2 | HEART RATE: 69 BPM | RESPIRATION RATE: 18 BRPM

## 2024-05-19 DIAGNOSIS — K59.00 CONSTIPATION, UNSPECIFIED CONSTIPATION TYPE: ICD-10-CM

## 2024-05-19 DIAGNOSIS — K57.90 DIVERTICULOSIS: Primary | ICD-10-CM

## 2024-05-19 DIAGNOSIS — R10.9 RIGHT FLANK PAIN: ICD-10-CM

## 2024-05-19 LAB
ALBUMIN SERPL BCP-MCNC: 3.7 G/DL (ref 3.5–5.2)
ALP SERPL-CCNC: 69 U/L (ref 55–135)
ALT SERPL W/O P-5'-P-CCNC: 21 U/L (ref 10–44)
ANION GAP SERPL CALC-SCNC: 10 MMOL/L (ref 8–16)
AST SERPL-CCNC: 27 U/L (ref 10–40)
BACTERIA #/AREA URNS AUTO: ABNORMAL /HPF
BASOPHILS # BLD AUTO: 0.08 K/UL (ref 0–0.2)
BASOPHILS NFR BLD: 1.1 % (ref 0–1.9)
BILIRUB SERPL-MCNC: 0.5 MG/DL (ref 0.1–1)
BILIRUB UR QL STRIP: NEGATIVE
BUN SERPL-MCNC: 11 MG/DL (ref 8–23)
CALCIUM SERPL-MCNC: 9.3 MG/DL (ref 8.7–10.5)
CHLORIDE SERPL-SCNC: 106 MMOL/L (ref 95–110)
CLARITY UR REFRACT.AUTO: CLEAR
CO2 SERPL-SCNC: 25 MMOL/L (ref 23–29)
COLOR UR AUTO: YELLOW
CREAT SERPL-MCNC: 0.7 MG/DL (ref 0.5–1.4)
DIFFERENTIAL METHOD BLD: NORMAL
EOSINOPHIL # BLD AUTO: 0.2 K/UL (ref 0–0.5)
EOSINOPHIL NFR BLD: 2.3 % (ref 0–8)
ERYTHROCYTE [DISTWIDTH] IN BLOOD BY AUTOMATED COUNT: 13.2 % (ref 11.5–14.5)
EST. GFR  (NO RACE VARIABLE): >60 ML/MIN/1.73 M^2
GLUCOSE SERPL-MCNC: 105 MG/DL (ref 70–110)
GLUCOSE UR QL STRIP: NEGATIVE
HCT VFR BLD AUTO: 42.4 % (ref 37–48.5)
HGB BLD-MCNC: 14.2 G/DL (ref 12–16)
HGB UR QL STRIP: ABNORMAL
IMM GRANULOCYTES # BLD AUTO: 0.02 K/UL (ref 0–0.04)
IMM GRANULOCYTES NFR BLD AUTO: 0.3 % (ref 0–0.5)
KETONES UR QL STRIP: NEGATIVE
LEUKOCYTE ESTERASE UR QL STRIP: ABNORMAL
LYMPHOCYTES # BLD AUTO: 1.3 K/UL (ref 1–4.8)
LYMPHOCYTES NFR BLD: 18.5 % (ref 18–48)
MCH RBC QN AUTO: 29.6 PG (ref 27–31)
MCHC RBC AUTO-ENTMCNC: 33.5 G/DL (ref 32–36)
MCV RBC AUTO: 89 FL (ref 82–98)
MICROSCOPIC COMMENT: ABNORMAL
MONOCYTES # BLD AUTO: 0.5 K/UL (ref 0.3–1)
MONOCYTES NFR BLD: 7.6 % (ref 4–15)
NEUTROPHILS # BLD AUTO: 4.9 K/UL (ref 1.8–7.7)
NEUTROPHILS NFR BLD: 70.2 % (ref 38–73)
NITRITE UR QL STRIP: NEGATIVE
NRBC BLD-RTO: 0 /100 WBC
PH UR STRIP: 7 [PH] (ref 5–8)
PLATELET # BLD AUTO: 180 K/UL (ref 150–450)
PMV BLD AUTO: 9.8 FL (ref 9.2–12.9)
POTASSIUM SERPL-SCNC: 4.2 MMOL/L (ref 3.5–5.1)
PROT SERPL-MCNC: 6.3 G/DL (ref 6–8.4)
PROT UR QL STRIP: NEGATIVE
RBC # BLD AUTO: 4.79 M/UL (ref 4–5.4)
RBC #/AREA URNS AUTO: 7 /HPF (ref 0–4)
SODIUM SERPL-SCNC: 141 MMOL/L (ref 136–145)
SP GR UR STRIP: 1.01 (ref 1–1.03)
SQUAMOUS #/AREA URNS AUTO: 3 /HPF
URN SPEC COLLECT METH UR: ABNORMAL
UROBILINOGEN UR STRIP-ACNC: NEGATIVE EU/DL
WBC # BLD AUTO: 7.01 K/UL (ref 3.9–12.7)
WBC #/AREA URNS AUTO: 1 /HPF (ref 0–5)

## 2024-05-19 PROCEDURE — 85025 COMPLETE CBC W/AUTO DIFF WBC: CPT | Mod: ER | Performed by: EMERGENCY MEDICINE

## 2024-05-19 PROCEDURE — 81000 URINALYSIS NONAUTO W/SCOPE: CPT | Mod: ER | Performed by: EMERGENCY MEDICINE

## 2024-05-19 PROCEDURE — 80053 COMPREHEN METABOLIC PANEL: CPT | Mod: ER | Performed by: EMERGENCY MEDICINE

## 2024-05-19 PROCEDURE — 99285 EMERGENCY DEPT VISIT HI MDM: CPT | Mod: 25,ER

## 2024-05-19 RX ORDER — SENNOSIDES 8.6 MG/1
1 CAPSULE, GELATIN COATED ORAL DAILY PRN
Qty: 30 EACH | Refills: 0 | Status: SHIPPED | OUTPATIENT
Start: 2024-05-19

## 2024-05-19 RX ORDER — MORPHINE SULFATE 4 MG/ML
2 INJECTION, SOLUTION INTRAMUSCULAR; INTRAVENOUS
Status: DISCONTINUED | OUTPATIENT
Start: 2024-05-19 | End: 2024-05-19

## 2024-05-19 RX ORDER — GABAPENTIN 100 MG/1
100 CAPSULE ORAL 2 TIMES DAILY
COMMUNITY

## 2024-05-19 RX ORDER — CLOPIDOGREL BISULFATE 75 MG/1
1 TABLET ORAL EVERY MORNING
COMMUNITY
Start: 2023-08-21 | End: 2024-08-20

## 2024-05-19 RX ORDER — POLYETHYLENE GLYCOL 3350 17 G/17G
POWDER, FOR SOLUTION ORAL
COMMUNITY

## 2024-05-19 RX ORDER — DOCUSATE SODIUM 100 MG/1
100 CAPSULE, LIQUID FILLED ORAL 2 TIMES DAILY PRN
Qty: 60 CAPSULE | Refills: 0 | Status: SHIPPED | OUTPATIENT
Start: 2024-05-19 | End: 2024-07-18

## 2024-05-19 RX ORDER — ATORVASTATIN CALCIUM 10 MG/1
1 TABLET, FILM COATED ORAL NIGHTLY
COMMUNITY
Start: 2023-08-24 | End: 2024-08-23

## 2024-05-19 RX ORDER — LEVOTHYROXINE SODIUM 50 UG/1
50 TABLET ORAL
COMMUNITY
Start: 2024-03-01

## 2024-05-19 RX ORDER — ADHESIVE BANDAGE
30 BANDAGE TOPICAL
Status: DISCONTINUED | OUTPATIENT
Start: 2024-05-19 | End: 2024-05-19

## 2024-05-19 RX ORDER — SERTRALINE HYDROCHLORIDE 100 MG/1
100 TABLET, FILM COATED ORAL 2 TIMES DAILY
COMMUNITY

## 2024-05-19 RX ORDER — METOPROLOL SUCCINATE 50 MG/1
1 TABLET, EXTENDED RELEASE ORAL DAILY
COMMUNITY
Start: 2023-08-24

## 2024-05-23 NOTE — ED PROVIDER NOTES
Emergency Medicine Provider Note - 5/19/2024       History     Chief Complaint   Patient presents with    Flank Pain     Intermittent R flank pain and RLQ abdominal pain with nausea for the past four days       Allergies:  Review of patient's allergies indicates:  No Known Allergies     History of Present Illness   HPI    5/19/2024, 11:19 AM  The history is provided by the patient    Linda Segura is a 74 y.o. female presenting to the ED for evaluation of abdominal pain.  Patient reports that she started having lower back pain about 4 days ago.  Then it radiated to the right lower quadrant.  It is not associated with any nausea, vomiting, fever, dysuria, urgency, frequency.  Patient does note constipation.  Patient reports that when she moves her torso, she has worsening pain to the right lower abdomen.  Previous appendectomy.  Previous treatment includes Tylenol prior to arrival.  Patient denies any fever, chills, chest pain, chest pressure, shortness of breath, difficulty breathing, cough      Arrival mode: Private Vehicle     PCP: Rolly Hammond MD     Past Medical History:  Past Medical History:   Diagnosis Date    Abnormal Pap smear of vagina     Anxiety     Anxiety and depression     Cancer     tongue    Mental disorder     Scoliosis        Past Surgical History:  Past Surgical History:   Procedure Laterality Date    BLADDER SURGERY      CERVICAL SPINE SURGERY      colpocleisis  04/2018    Dr. Strong    feet Bilateral     HYSTERECTOMY      severe endometriosis    KNEE SURGERY      OOPHORECTOMY Bilateral     severe endometriosis         Family History:  Family History   Problem Relation Name Age of Onset    Heart disease Mother      Diabetes Mother      Hypertension Sister      Breast cancer Maternal Aunt      Cancer Maternal Uncle      Colon cancer Maternal Uncle      Hypertension Sister      Hypertension Daughter      Stroke Daughter      Hypertension Daughter      Ovarian cancer Neg Hx    Patient Education       Well Child Exam 11 to 14 Years   About this topic   Your child's well child exam is a visit with the doctor to check your child's health. The doctor measures your child's weight and height, and may measure your child's body mass index (BMI). The doctor plots these numbers on a growth curve. The growth curve gives a picture of your child's growth at each visit. The doctor may listen to your child's heart, lungs, and belly. Your doctor will do a full exam of your child from the head to the toes.  Your child may also need shots or blood tests during this visit.  General   Growth and Development   Your doctor will ask you how your child is developing. The doctor will focus on the skills that most children your child's age are expected to do. During this time of your child's life, here are some things you can expect.  Physical development - Your child may:  Show signs of maturing physically  Need reminders about drinking water when playing  Be a little clumsy while growing  Hearing, seeing, and talking - Your child may:  Be able to see the long-term effects of actions  Understand many viewpoints  Begin to question and challenge existing rules  Want to help set household rules  Feelings and behavior - Your child may:  Want to spend time alone or with friends rather than with family  Have an interest in dating and the opposite sex  Value the opinions of friends over parents' thoughts or ideas  Want to push the limits of what is allowed  Believe bad things wont happen to them  Feeding - Your child needs:  To learn to make healthy choices when eating. Serve healthy foods like lean meats, fruits, vegetables, and whole grains. Help your child choose healthy foods when out to eat.  To start each day with a healthy breakfast  To limit soda, chips, candy, and foods that are high in fats and sugar  Healthy snacks available like fruit, cheese and crackers, or peanut butter  To eat meals as a part of the        Social History:  Social History     Tobacco Use    Smoking status: Never    Smokeless tobacco: Never   Substance and Sexual Activity    Alcohol use: No     Alcohol/week: 0.0 standard drinks of alcohol    Drug use: No    Sexual activity: Not Currently        Review of Systems   Review of Systems   Constitutional:  Negative for fever.   Respiratory:  Negative for shortness of breath.    Cardiovascular:  Negative for chest pain.   Gastrointestinal:  Positive for abdominal pain. Negative for nausea and vomiting.   Genitourinary:  Negative for dysuria.   Musculoskeletal:  Positive for back pain.   Skin:  Negative for rash.   Neurological:  Negative for weakness.   Hematological:  Does not bruise/bleed easily.        Physical Exam     Initial Vitals [05/19/24 1116]   BP Pulse Resp Temp SpO2   (!) 112/58 73 18 98.6 °F (37 °C) 97 %      MAP       --          Physical Exam    Nursing Notes and Vital Signs Reviewed.  Constitutional: Patient is in no apparent distress. Well-developed and well-nourished.  Head: Atraumatic. Normocephalic.  Eyes: PERRL. EOM intact. Conjunctivae are not pale. No scleral icterus.  ENT: Mucous membranes are moist. Oropharynx is clear and symmetric.    Neck: Supple. Full ROM. No lymphadenopathy.  Cardiovascular: Regular rate. Regular rhythm. No murmurs, rubs, or gallops. Distal pulses are 2+ and symmetric.  Pulmonary/Chest: No respiratory distress. Clear to auscultation bilaterally. No wheezing or rales.  Abdominal: Soft and non-distended.  There is RLQ tenderness.  No rebound, guarding, or rigidity. Good bowel sounds.  Genitourinary: No CVA tenderness  Musculoskeletal: Moves all extremities. No obvious deformities. No edema. No calf tenderness.  T-spine: No midline T-spine tenderness appreciated.  L-spine: No midline L-spine tenderness appreciated.  Skin: Warm and dry.  Neurological:  Alert, awake, and appropriate.  Normal speech.  No acute focal neurological deficits are  family. Turn the TV and cell phones off while eating. Talk about your day, rather than focusing on what your child is eating.  Sleep - Your child:  Needs more sleep  Is likely sleeping about 8 to 10 hours in a row at night  Should be allowed to read each night before bed. Have your child brush and floss the teeth before going to bed as well.  Should limit TV and computers for the hour before bedtime  Keep cell phones, tablets, televisions, and other electronic devices out of bedrooms overnight. They interfere with sleep.  Needs a routine to make week nights easier. Encourage your child to get up at a normal time on weekends instead of sleeping late.  Shots or vaccines - It is important for your child to get shots on time. This protects your child from very serious illnesses like pneumonia, blood and brain infections, tetanus, flu, or cancer. Your child may need:  HPV or human papillomavirus vaccine  Tdap or tetanus, diphtheria, and pertussis vaccine  Meningococcal vaccine  Influenza vaccine  Help for Parents   Activities.  Encourage your child to spend at least 1 hour each day being physically active.  Offer your child a variety of activities to take part in. Include music, sports, arts and crafts, and other things your child is interested in. Take care not to over schedule your child. One to 2 activities a week outside of school is often a good number for your child.  Make sure your child wears a helmet when using anything with wheels like skates, skateboard, bike, etc.  Encourage time spent with friends. Provide a safe area for this.  Here are some things you can do to help keep your child safe and healthy.  Talk to your child about the dangers of smoking, drinking alcohol, and using drugs. Do not allow anyone to smoke in your home or around your child.  Make sure your child uses a seat belt when riding in the car. Your child should ride in the back seat until 13 years of age.  Talk with your child about peer  "appreciated.  Psychiatric: Normal affect. Good eye contact. Appropriate in content.     ED Course   ED Procedures:  Procedures    ED Vital Signs:  Vitals:    05/19/24 1116 05/19/24 1131 05/19/24 1156 05/19/24 1201   BP: (!) 112/58 121/60  131/60   Pulse: 73 71 77 72   Resp: 18 19  20   Temp: 98.6 °F (37 °C)      TempSrc: Oral      SpO2: 97% 95% 97% 96%   Weight: 74.8 kg (165 lb)      Height: 5' 7" (1.702 m)       05/19/24 1300 05/19/24 1341   BP: 121/76 129/68   Pulse: 66 69   Resp: 18 18   Temp:     TempSrc:     SpO2: 99% 97%   Weight:     Height:         Abnormal Lab Results:  Labs Reviewed   URINALYSIS, REFLEX TO URINE CULTURE - Abnormal; Notable for the following components:       Result Value    Occult Blood UA 3+ (*)     Leukocytes, UA Trace (*)     All other components within normal limits    Narrative:     Specimen Source->Urine   URINALYSIS MICROSCOPIC - Abnormal; Notable for the following components:    RBC, UA 7 (*)     All other components within normal limits    Narrative:     Specimen Source->Urine   CBC W/ AUTO DIFFERENTIAL   COMPREHENSIVE METABOLIC PANEL        All Lab Results:  Results for orders placed or performed during the hospital encounter of 05/19/24   CBC auto differential   Result Value Ref Range    WBC 7.01 3.90 - 12.70 K/uL    RBC 4.79 4.00 - 5.40 M/uL    Hemoglobin 14.2 12.0 - 16.0 g/dL    Hematocrit 42.4 37.0 - 48.5 %    MCV 89 82 - 98 fL    MCH 29.6 27.0 - 31.0 pg    MCHC 33.5 32.0 - 36.0 g/dL    RDW 13.2 11.5 - 14.5 %    Platelets 180 150 - 450 K/uL    MPV 9.8 9.2 - 12.9 fL    Immature Granulocytes 0.3 0.0 - 0.5 %    Gran # (ANC) 4.9 1.8 - 7.7 K/uL    Immature Grans (Abs) 0.02 0.00 - 0.04 K/uL    Lymph # 1.3 1.0 - 4.8 K/uL    Mono # 0.5 0.3 - 1.0 K/uL    Eos # 0.2 0.0 - 0.5 K/uL    Baso # 0.08 0.00 - 0.20 K/uL    nRBC 0 0 /100 WBC    Gran % 70.2 38.0 - 73.0 %    Lymph % 18.5 18.0 - 48.0 %    Mono % 7.6 4.0 - 15.0 %    Eosinophil % 2.3 0.0 - 8.0 %    Basophil % 1.1 0.0 - 1.9 %    " pressure. Help your child learn how to handle risky things friends may want to do.  Remind your child to use headphones responsibly. Limit how loud the volume is turned up. Never wear headphones, text, or use a cell phone while riding a bike or crossing the street.  Protect your child from gun injuries. If you have a gun, use a trigger lock. Keep the gun locked up and the bullets kept in a separate place.  Limit screen time for children to 1 to 2 hours per day. This includes TV, phones, computers, and video games.  Discuss social media safety  Parents need to think about:  Monitoring your child's computer use, especially when on the Internet  How to keep open lines of communication about unwanted touch, sex, and dating  How to continue to talk about puberty  Having your child help with some family chores to encourage responsibility within the family  Helping children make healthy choices  The next well child visit will most likely be in 1 year. At this visit, your doctor may:  Do a full check up on your child  Talk about school, friends, and social skills  Talk about sexuality and sexually-transmitted diseases  Talk about driving and safety  When do I need to call the doctor?   Fever of 100.4°F (38°C) or higher  Your child has not started puberty by age 14  Low mood, suddenly getting poor grades, or missing school  You are worried about your child's development  Where can I learn more?   Centers for Disease Control and Prevention  https://www.cdc.gov/ncbddd/childdevelopment/positiveparenting/adolescence.html   Centers for Disease Control and Prevention  https://www.cdc.gov/vaccines/parents/diseases/teen/index.html   KidsHealth  http://kidshealth.org/parent/growth/medical/checkup_11yrs.html#zqy646   KidsHealth  http://kidshealth.org/parent/growth/medical/checkup_12yrs.html#bjj561   KidsHealth  http://kidshealth.org/parent/growth/medical/checkup_13yrs.html#moe178    KidsHealth  http://kidshealth.org/parent/growth/medical/checkup_14yrs.html#   Last Reviewed Date   2019-10-14  Consumer Information Use and Disclaimer   This information is not specific medical advice and does not replace information you receive from your health care provider. This is only a brief summary of general information. It does NOT include all information about conditions, illnesses, injuries, tests, procedures, treatments, therapies, discharge instructions or life-style choices that may apply to you. You must talk with your health care provider for complete information about your health and treatment options. This information should not be used to decide whether or not to accept your health care providers advice, instructions or recommendations. Only your health care provider has the knowledge and training to provide advice that is right for you.  Copyright   Copyright © 2021 [employer] and its affiliates and/or licensors. All rights reserved.    At 9 years old, children who have outgrown the booster seat may use the adult safety belt fastened correctly.   If you have an active [facility] account, please look for your well child questionnaire to come to your [facility] account before your next well child visit.   Differential Method Automated    Comprehensive metabolic panel   Result Value Ref Range    Sodium 141 136 - 145 mmol/L    Potassium 4.2 3.5 - 5.1 mmol/L    Chloride 106 95 - 110 mmol/L    CO2 25 23 - 29 mmol/L    Glucose 105 70 - 110 mg/dL    BUN 11 8 - 23 mg/dL    Creatinine 0.7 0.5 - 1.4 mg/dL    Calcium 9.3 8.7 - 10.5 mg/dL    Total Protein 6.3 6.0 - 8.4 g/dL    Albumin 3.7 3.5 - 5.2 g/dL    Total Bilirubin 0.5 0.1 - 1.0 mg/dL    Alkaline Phosphatase 69 55 - 135 U/L    AST 27 10 - 40 U/L    ALT 21 10 - 44 U/L    eGFR >60.0 >60 mL/min/1.73 m^2    Anion Gap 10 8 - 16 mmol/L   Urinalysis, Reflex to Urine Culture Urine, Clean Catch    Specimen: Urine   Result Value Ref Range    Specimen UA Urine, Clean Catch     Color, UA Yellow Yellow, Straw, Lorna    Appearance, UA Clear Clear    pH, UA 7.0 5.0 - 8.0    Specific Gravity, UA 1.010 1.005 - 1.030    Protein, UA Negative Negative    Glucose, UA Negative Negative    Ketones, UA Negative Negative    Bilirubin (UA) Negative Negative    Occult Blood UA 3+ (A) Negative    Nitrite, UA Negative Negative    Urobilinogen, UA Negative <2.0 EU/dL    Leukocytes, UA Trace (A) Negative   Urinalysis Microscopic   Result Value Ref Range    RBC, UA 7 (H) 0 - 4 /hpf    WBC, UA 1 0 - 5 /hpf    Bacteria Rare None-Occ /hpf    Squam Epithel, UA 3 /hpf    Microscopic Comment SEE COMMENT            Imaging Results:  Imaging Results              CT Renal Stone Study ABD Pelvis WO (Final result)  Result time 05/19/24 12:29:07      Final result by Bravo Ames MD (05/19/24 12:29:07)                   Impression:      1.  Increased stool throughout the colon.  Could the patient's symptoms be related to constipation?    2. Bilateral basilar atelectasis.  Early pneumonia difficult to exclude in the right clinical setting.  Lungs are otherwise clear.    3.  Negative for acute process otherwise.  Negative for inflammatory changes.  Negative for free air.  Negative for renal stone disease or  hydronephrosis.  I do not see a normal or an abnormal appendix.    4.  Coronary artery calcifications.  Hepatic cysts.  Nonobstructing 5 mm midpole left renal cyst.  Degenerative changes of the spine and pelvis.  Absent uterus.  Bilateral total hip arthroplasty devices.  Other stable and nonemergent findings as described above.    All CT scans at this facility are performed  using dose modulation techniques as appropriate to performed exam including the following:  automated exposure control; adjustment of mA and/or kV according to the patients size (this includes techniques or standardized protocols for targeted exams where dose is matched to indication/reason for exam: i.e. extremities or head);  iterative reconstruction technique.      Electronically signed by: Bravo Ames MD  Date:    05/19/2024  Time:    12:29               Narrative:    EXAMINATION:  CT RENAL STONE STUDY ABD PELVIS WO, multiplanar reconstructions    CLINICAL HISTORY:  Unspecified abdominal pain.Flank pain, kidney stone suspected;    TECHNIQUE:  Axial images through the abdomen and pelvis were obtained without the use of IV contrast.  Sagittal and coronal reconstructions are provided for review.  Oral contrast was not utilized.    COMPARISON:  May 13, 2019    FINDINGS:  LUNG BASES: Moderate to marked dependent atelectasis in the lung bases.  Lung bases are otherwise clear.  Negative for pleural or pericardial effusions. The distal esophagus is normal.  Coronary artery calcifications.    ABDOMEN: There is slight prominence of the right renal pelvis, however, the ureter is normal in caliber, suggesting possible very mild chronic UPJ obstruction.  Nonobstructing midpole 5 mm left renal stone.  Subcentimeter bilateral hepatic lobe cysts again seen interval increase in size of a 1.1 cm inferior left hepatic lobe cyst.  The liver, spleen and gallbladder otherwise appear normal.  The pancreas is unremarkable.  Kidneys and adrenal glands are  otherwise normal.    Negative for adenopathy, free fluid or inflammatory change noted within the abdomen or pelvis.  Vascular calcifications are present without aneurysmal changes.    Increased stool.  Lipomatous hypertrophy of the ileocecal valve.  Large and small bowel loops are otherwise normal.  I do not see a normal or an abnormal appendix.  There are a few scattered colonic diverticula.  Negative for free air.    PELVIS: The urinary bladder is grossly unremarkable, considering there is streak artifact from bilateral total hip arthroplasty devices.  The uterus is absent.  The rest of the female pelvic organs are normal. There are pelvic phleboliths.    Tiny fat filled umbilical hernia.  The abdominal wall is otherwise intact.    No significant osseous abnormality is identified.  Negative for significant spinal canal stenosis. Stable degenerative changes of the thoracic and lumbar spine with marked convex right curvature of the thoracolumbar junction.    Negative for groin adenopathy.                                            The Emergency Provider reviewed the vital signs and test results, which are outlined above.     ED Discussion   ED Medication(s):  Medications - No data to display    ED Course as of 05/20/24 1020   Sun May 19, 2024   1254 WBC: 7.01 [LB]   1335 Discussed results.     Abdomen soft, no rebound, no guarding, no masses.  [LB]   1338 Clinically, patient has not had any fever or cough to suggest pneumonia.  Recommended repeat urinalysis with primary care physician to screened for hematuria.  Patient's current hematuria most likely due to attempts at catheterization for urine sample [LB]      ED Course User Index  [LB] Lynnette Rutherford,         13:38 Reassessment: Dr. Rutherford reassessed the pt.  The pt is resting comfortably and is NAD.  Pt states their sx have improved. Discussed test results, shared treatment plan, specific conditions for return, and the need for f/u.  Answered their  questions at this time.  Pt understands and agrees to the plan.  The pt has remained hemodynamically stable through ED course and is stable for discharge.    I discussed with patient and/or family/caretaker that evaluation in the ED does not suggest any emergent or life threatening medical conditions requiring immediate intervention beyond what was provided in the ED, and I believe patient is safe for discharge.  Regardless, an unremarkable evaluation in the ED does not preclude the development or presence of a serious of life threatening condition. As such, patient was instructed to return immediately for any worsening or change in current symptoms.    Regarding ABDOMINAL PAIN, I recommended that the patient: Sip water or other clear fluids; avoid solid food for the first few hours after vomiting or diarrhea; if vomiting, wait 6 hours, and then eat small amounts of mild foods such as rice, applesauce, or crackers; avoid dairy products; avoid citrus, high-fat foods, fried or greasy foods, tomato products, caffeine, alcohol, and carbonated beverages;  avoid aspirin, ibuprofen or other anti-inflammatory medications, and narcotic pain medications unless prescribed.  In regards to prevention, I encouraged patient to:  Avoid fatty or greasy foods; drink plenty of water each day; eat small meals more frequently; exercise regularly; limit foods that produce gas; make sure meals are well-balanced and high in fiber and include plenty of fruits and vegetables.    Regarding CONSTIPATION, I informed patient of common causes of constipation (low-fiber diet, lack of physical activity, decreased fluid intake, and delays in going to the bathroom when urge is present) and ways to prevent it (eat lots of fiber, drink plenty of fluids daily, exercise regularly, and go to the bathroom when you urge presents.  For treatment, advised patient to use OTC medications:  stool softeners (docusate sodium), bulk laxatives (psyllium) to help add  fluid and bulk to the stool, suppositories or gentle laxatives (milk of magnesia liquid), and to reserve enemas or stimulant laxatives for severe cases only. Reiterated the importance of following up with primary care provider for management of their constipation.       MIPS Measures     Smoker? No     Hypertension: History of Hypertension: The patient has elevated blood pressure (higher than 120/80) while being treated in the ED but has a history of hypertension.       Medical Decision Making                 Medical Decision Making  Differential diagnosis:  Diverticulitis, urinary tract infection, renal colic, obstipation/constipation    ED course:  Cath urinary specimen:  +3 occult blood, negative nitrites.  Seven reds.  Likely due to recent catheterization.  Recommended patient have repeat urinalysis at primary care physician's.  CBC nl.  No left shift.  CMP nl.  CT renal stone: Increased stool.  Bibasilar atelectasis.  Patient is not having any symptoms to suggest pneumonia.  No fever cough.  Hepatic cyst, left renal renal cyst.  5 mm stone.  DJD.  Tiny umbilical hernia.  Diverticulosis.  No diverticulitis.  Patient underwent repeat abdominal exam,, no rebound, no guarding, no masses.  Likely etiology of pain is secondary to constipation.  Prescription for Colace and senna.    Amount and/or Complexity of Data Reviewed  Labs: ordered. Decision-making details documented in ED Course.  Radiology: ordered. Decision-making details documented in ED Course.    Risk  OTC drugs.  Prescription drug management.  Risk Details: Discharge Medication List as of 5/19/2024  1:40 PM    START taking these medications    docusate sodium (COLACE) 100 MG capsule  Take 1 capsule (100 mg total) by mouth 2 (two) times daily as needed for Constipation., Starting Sun 5/19/2024, Until Thu 7/18/2024 at 2359, Print    sennosides (SENNA) 8.6 mg Cap  Take 1 tablet by mouth daily as needed (Constipation)., Starting Sun 5/19/2024,  Print                Coding    Prescription Management: I performed a review of the patient's current Rx medication list as input by nursing staff.    Discharge Medication List as of 5/19/2024  1:40 PM        START taking these medications    Details   docusate sodium (COLACE) 100 MG capsule Take 1 capsule (100 mg total) by mouth 2 (two) times daily as needed for Constipation., Starting Sun 5/19/2024, Until Thu 7/18/2024 at 2359, Print      sennosides (SENNA) 8.6 mg Cap Take 1 tablet by mouth daily as needed (Constipation)., Starting Sun 5/19/2024, Print           CONTINUE these medications which have NOT CHANGED    Details   aspirin (ECOTRIN) 81 MG EC tablet Take 81 mg by mouth once daily., Until Discontinued, Historical Med      atorvastatin (LIPITOR) 10 MG tablet Take 1 tablet by mouth every evening., Starting Thu 8/24/2023, Until Fri 8/23/2024, Historical Med      clopidogreL (PLAVIX) 75 mg tablet Take 1 tablet by mouth every morning., Starting Mon 8/21/2023, Until Tue 8/20/2024, Historical Med      gabapentin (NEURONTIN) 100 MG capsule Take 100 mg by mouth 2 (two) times daily., Historical Med      levothyroxine (SYNTHROID) 50 MCG tablet Take 50 mcg by mouth., Starting Fri 3/1/2024, Historical Med      metoprolol succinate (TOPROL-XL) 50 MG 24 hr tablet Take 1 tablet by mouth once daily., Starting Thu 8/24/2023, Historical Med      pantoprazole (PROTONIX) 40 MG tablet Take 40 mg by mouth., Starting Mon 5/4/2015, Until Sun 5/19/2024 at 2359, Historical Med      polyethylene glycol (GLYCOLAX) 17 gram PwPk Take by mouth., Historical Med      sertraline (ZOLOFT) 100 MG tablet Take 100 mg by mouth 2 (two) times daily., Historical Med      zolpidem (AMBIEN) 10 mg Tab Take 10 mg by mouth nightly as needed., Starting 2/24/2016, Until Discontinued, Historical Med      alprazolam (XANAX) 1 MG tablet Take 1 tablet (1 mg total) by mouth 2 (two) times daily as needed for Anxiety., Starting 4/30/2015, Until Discontinued, No  "Print      baclofen (LIORESAL) 10 MG tablet Take 10 mg by mouth., Starting 10/14/2015, Until Fri 11/13/15, Historical Med      cholecalciferol, vitamin D3, 10,000 unit Cap Take 1 capsule by mouth., Starting 9/24/2015, Until Discontinued, Historical Med      clobetasol 0.05% (TEMOVATE) 0.05 % Oint APPLY TOPICALLY 2 (TWO) TIMES DAILY., Starting Thu 1/18/2018, Until Sun 1/28/2018, Normal              Discussed case with:N/A    Portions of this note may have been created with voice recognition software. Occasional "wrong-word" or "sound-a-like" substitutions may have occurred due to the inherent limitations of voice recognition software. Please, read the note carefully and recognize, using context, where substitutions have occurred.          Clinical Impression       ICD-10-CM ICD-9-CM   1. Diverticulosis  K57.90 562.10   2. Right flank pain  R10.9 789.09   3. Constipation, unspecified constipation type  K59.00 564.00        Disposition        Disposition: Discharge to home  Patient condition: Stable      ED Follow-up      Follow-up Information       Rolly Hammond MD In 2 days.    Specialty: Internal Medicine  Why: Return to emergency department for chest pain, chest pressure, fever, difficulty breathing, severe stomach pain, or other concerns.  Contact information:  [address] [facility] [address]  [phone]                                      Lynnette Rutherford, DO  05/20/24 1021       Lynnette Rutherford, DO  05/20/24 1021    "

## 2025-01-24 ENCOUNTER — HOSPITAL ENCOUNTER (INPATIENT)
Facility: HOSPITAL | Age: 75
LOS: 8 days | Discharge: HOME HEALTH | DRG: 193 | End: 2025-02-01
Attending: EMERGENCY MEDICINE | Admitting: HOSPITALIST
Payer: MEDICARE

## 2025-01-24 DIAGNOSIS — Z13.6 SCREENING FOR CARDIOVASCULAR CONDITION: ICD-10-CM

## 2025-01-24 DIAGNOSIS — F41.9 ANXIETY AND DEPRESSION: ICD-10-CM

## 2025-01-24 DIAGNOSIS — R06.02 SOB (SHORTNESS OF BREATH): ICD-10-CM

## 2025-01-24 DIAGNOSIS — I50.33 ACUTE ON CHRONIC DIASTOLIC CONGESTIVE HEART FAILURE: ICD-10-CM

## 2025-01-24 DIAGNOSIS — F32.A ANXIETY AND DEPRESSION: ICD-10-CM

## 2025-01-24 DIAGNOSIS — J18.9 PNEUMONIA: Primary | ICD-10-CM

## 2025-01-24 DIAGNOSIS — R07.9 CHEST PAIN: ICD-10-CM

## 2025-01-24 DIAGNOSIS — J96.01 ACUTE HYPOXEMIC RESPIRATORY FAILURE: ICD-10-CM

## 2025-01-24 LAB
ALBUMIN SERPL BCP-MCNC: 3.5 G/DL (ref 3.5–5.2)
ALP SERPL-CCNC: 71 U/L (ref 40–150)
ALT SERPL W/O P-5'-P-CCNC: 20 U/L (ref 10–44)
ANION GAP SERPL CALC-SCNC: 15 MMOL/L (ref 8–16)
AST SERPL-CCNC: 24 U/L (ref 10–40)
BACTERIA #/AREA URNS AUTO: NORMAL /HPF
BASOPHILS # BLD AUTO: 0.01 K/UL (ref 0–0.2)
BASOPHILS NFR BLD: 0.1 % (ref 0–1.9)
BILIRUB SERPL-MCNC: 1.4 MG/DL (ref 0.1–1)
BILIRUB UR QL STRIP: NEGATIVE
BNP SERPL-MCNC: 487 PG/ML (ref 0–99)
BUN SERPL-MCNC: 14 MG/DL (ref 8–23)
CALCIUM SERPL-MCNC: 8.9 MG/DL (ref 8.7–10.5)
CHLORIDE SERPL-SCNC: 102 MMOL/L (ref 95–110)
CLARITY UR REFRACT.AUTO: CLEAR
CO2 SERPL-SCNC: 20 MMOL/L (ref 23–29)
COLOR UR AUTO: YELLOW
CREAT SERPL-MCNC: 0.8 MG/DL (ref 0.5–1.4)
CTP QC/QA: YES
CTP QC/QA: YES
DIFFERENTIAL METHOD BLD: ABNORMAL
EOSINOPHIL # BLD AUTO: 0 K/UL (ref 0–0.5)
EOSINOPHIL NFR BLD: 0 % (ref 0–8)
ERYTHROCYTE [DISTWIDTH] IN BLOOD BY AUTOMATED COUNT: 13.6 % (ref 11.5–14.5)
EST. GFR  (NO RACE VARIABLE): >60 ML/MIN/1.73 M^2
GIANT PLATELETS BLD QL SMEAR: PRESENT
GLUCOSE SERPL-MCNC: 240 MG/DL (ref 70–110)
GLUCOSE UR QL STRIP: NEGATIVE
HCT VFR BLD AUTO: 40.6 % (ref 37–48.5)
HGB BLD-MCNC: 13.9 G/DL (ref 12–16)
HGB UR QL STRIP: NEGATIVE
IMM GRANULOCYTES # BLD AUTO: 0.02 K/UL (ref 0–0.04)
IMM GRANULOCYTES NFR BLD AUTO: 0.2 % (ref 0–0.5)
KETONES UR QL STRIP: NEGATIVE
LACTATE SERPL-SCNC: 1.1 MMOL/L (ref 0.5–2.2)
LACTATE SERPL-SCNC: 3.7 MMOL/L (ref 0.5–2.2)
LEUKOCYTE ESTERASE UR QL STRIP: ABNORMAL
LIPASE SERPL-CCNC: 7 U/L (ref 4–60)
LYMPHOCYTES # BLD AUTO: 0.4 K/UL (ref 1–4.8)
LYMPHOCYTES NFR BLD: 3.5 % (ref 18–48)
MCH RBC QN AUTO: 29.8 PG (ref 27–31)
MCHC RBC AUTO-ENTMCNC: 34.2 G/DL (ref 32–36)
MCV RBC AUTO: 87 FL (ref 82–98)
MICROSCOPIC COMMENT: NORMAL
MONOCYTES # BLD AUTO: 0.3 K/UL (ref 0.3–1)
MONOCYTES NFR BLD: 2.8 % (ref 4–15)
NEUTROPHILS # BLD AUTO: 10.1 K/UL (ref 1.8–7.7)
NEUTROPHILS NFR BLD: 93.4 % (ref 38–73)
NITRITE UR QL STRIP: NEGATIVE
NRBC BLD-RTO: 0 /100 WBC
PH UR STRIP: 6 [PH] (ref 5–8)
PLATELET # BLD AUTO: 129 K/UL (ref 150–450)
PLATELET BLD QL SMEAR: ABNORMAL
PMV BLD AUTO: 10.2 FL (ref 9.2–12.9)
POC MOLECULAR INFLUENZA A AGN: NEGATIVE
POC MOLECULAR INFLUENZA B AGN: NEGATIVE
POTASSIUM SERPL-SCNC: 2.8 MMOL/L (ref 3.5–5.1)
PROCALCITONIN SERPL IA-MCNC: 0.71 NG/ML
PROT SERPL-MCNC: 6.5 G/DL (ref 6–8.4)
PROT UR QL STRIP: NEGATIVE
RBC # BLD AUTO: 4.66 M/UL (ref 4–5.4)
RBC #/AREA URNS AUTO: 1 /HPF (ref 0–4)
SARS-COV-2 RDRP RESP QL NAA+PROBE: NEGATIVE
SODIUM SERPL-SCNC: 137 MMOL/L (ref 136–145)
SP GR UR STRIP: 1.01 (ref 1–1.03)
TROPONIN I SERPL DL<=0.01 NG/ML-MCNC: 0.03 NG/ML (ref 0–0.03)
URN SPEC COLLECT METH UR: ABNORMAL
UROBILINOGEN UR STRIP-ACNC: NEGATIVE EU/DL
WBC # BLD AUTO: 10.83 K/UL (ref 3.9–12.7)
WBC #/AREA URNS AUTO: 4 /HPF (ref 0–5)

## 2025-01-24 PROCEDURE — 99900035 HC TECH TIME PER 15 MIN (STAT): Mod: ER

## 2025-01-24 PROCEDURE — 96365 THER/PROPH/DIAG IV INF INIT: CPT | Mod: ER

## 2025-01-24 PROCEDURE — 83690 ASSAY OF LIPASE: CPT | Mod: ER | Performed by: EMERGENCY MEDICINE

## 2025-01-24 PROCEDURE — 83605 ASSAY OF LACTIC ACID: CPT | Mod: ER | Performed by: EMERGENCY MEDICINE

## 2025-01-24 PROCEDURE — 93010 ELECTROCARDIOGRAM REPORT: CPT | Mod: ,,, | Performed by: INTERNAL MEDICINE

## 2025-01-24 PROCEDURE — 99285 EMERGENCY DEPT VISIT HI MDM: CPT | Mod: 25,ER

## 2025-01-24 PROCEDURE — 84145 PROCALCITONIN (PCT): CPT | Mod: ER | Performed by: EMERGENCY MEDICINE

## 2025-01-24 PROCEDURE — 87040 BLOOD CULTURE FOR BACTERIA: CPT | Performed by: EMERGENCY MEDICINE

## 2025-01-24 PROCEDURE — 87635 SARS-COV-2 COVID-19 AMP PRB: CPT | Mod: ER | Performed by: EMERGENCY MEDICINE

## 2025-01-24 PROCEDURE — 84484 ASSAY OF TROPONIN QUANT: CPT | Mod: ER | Performed by: EMERGENCY MEDICINE

## 2025-01-24 PROCEDURE — 93005 ELECTROCARDIOGRAM TRACING: CPT | Mod: ER

## 2025-01-24 PROCEDURE — 80053 COMPREHEN METABOLIC PANEL: CPT | Mod: ER | Performed by: EMERGENCY MEDICINE

## 2025-01-24 PROCEDURE — 87389 HIV-1 AG W/HIV-1&-2 AB AG IA: CPT | Performed by: EMERGENCY MEDICINE

## 2025-01-24 PROCEDURE — 96368 THER/DIAG CONCURRENT INF: CPT | Mod: ER

## 2025-01-24 PROCEDURE — 87502 INFLUENZA DNA AMP PROBE: CPT | Mod: ER

## 2025-01-24 PROCEDURE — 25000003 PHARM REV CODE 250: Mod: ER | Performed by: EMERGENCY MEDICINE

## 2025-01-24 PROCEDURE — 86803 HEPATITIS C AB TEST: CPT | Performed by: EMERGENCY MEDICINE

## 2025-01-24 PROCEDURE — 27000221 HC OXYGEN, UP TO 24 HOURS: Mod: ER

## 2025-01-24 PROCEDURE — 83880 ASSAY OF NATRIURETIC PEPTIDE: CPT | Mod: ER | Performed by: EMERGENCY MEDICINE

## 2025-01-24 PROCEDURE — 81000 URINALYSIS NONAUTO W/SCOPE: CPT | Mod: ER | Performed by: EMERGENCY MEDICINE

## 2025-01-24 PROCEDURE — 21400001 HC TELEMETRY ROOM

## 2025-01-24 PROCEDURE — 85025 COMPLETE CBC W/AUTO DIFF WBC: CPT | Mod: ER | Performed by: EMERGENCY MEDICINE

## 2025-01-24 PROCEDURE — 96375 TX/PRO/DX INJ NEW DRUG ADDON: CPT | Mod: ER

## 2025-01-24 PROCEDURE — 63600175 PHARM REV CODE 636 W HCPCS: Mod: ER | Performed by: EMERGENCY MEDICINE

## 2025-01-24 RX ORDER — SODIUM CHLORIDE 0.9 % (FLUSH) 0.9 %
3 SYRINGE (ML) INJECTION EVERY 12 HOURS PRN
Status: DISCONTINUED | OUTPATIENT
Start: 2025-01-25 | End: 2025-02-01 | Stop reason: HOSPADM

## 2025-01-24 RX ORDER — ALUMINUM HYDROXIDE, MAGNESIUM HYDROXIDE, AND SIMETHICONE 1200; 120; 1200 MG/30ML; MG/30ML; MG/30ML
30 SUSPENSION ORAL 4 TIMES DAILY PRN
Status: DISCONTINUED | OUTPATIENT
Start: 2025-01-25 | End: 2025-02-01 | Stop reason: HOSPADM

## 2025-01-24 RX ORDER — ACETAMINOPHEN 325 MG/1
650 TABLET ORAL EVERY 6 HOURS PRN
Status: DISCONTINUED | OUTPATIENT
Start: 2025-01-25 | End: 2025-02-01 | Stop reason: HOSPADM

## 2025-01-24 RX ORDER — CEFTRIAXONE 1 G/1
1 INJECTION, POWDER, FOR SOLUTION INTRAMUSCULAR; INTRAVENOUS
Status: DISCONTINUED | OUTPATIENT
Start: 2025-01-25 | End: 2025-02-01 | Stop reason: HOSPADM

## 2025-01-24 RX ORDER — SIMETHICONE 80 MG
1 TABLET,CHEWABLE ORAL 4 TIMES DAILY PRN
Status: DISCONTINUED | OUTPATIENT
Start: 2025-01-25 | End: 2025-02-01 | Stop reason: HOSPADM

## 2025-01-24 RX ORDER — CEFTRIAXONE 1 G/1
1 INJECTION, POWDER, FOR SOLUTION INTRAMUSCULAR; INTRAVENOUS
Status: COMPLETED | OUTPATIENT
Start: 2025-01-24 | End: 2025-01-24

## 2025-01-24 RX ORDER — IBUPROFEN 200 MG
24 TABLET ORAL
Status: DISCONTINUED | OUTPATIENT
Start: 2025-01-25 | End: 2025-02-01 | Stop reason: HOSPADM

## 2025-01-24 RX ORDER — TALC
6 POWDER (GRAM) TOPICAL NIGHTLY PRN
Status: DISCONTINUED | OUTPATIENT
Start: 2025-01-25 | End: 2025-02-01 | Stop reason: HOSPADM

## 2025-01-24 RX ORDER — ACETAMINOPHEN 650 MG/1
650 SUPPOSITORY RECTAL EVERY 6 HOURS PRN
Status: DISCONTINUED | OUTPATIENT
Start: 2025-01-25 | End: 2025-02-01 | Stop reason: HOSPADM

## 2025-01-24 RX ORDER — NALOXONE HCL 0.4 MG/ML
0.02 VIAL (ML) INJECTION
Status: DISCONTINUED | OUTPATIENT
Start: 2025-01-25 | End: 2025-02-01 | Stop reason: HOSPADM

## 2025-01-24 RX ORDER — POTASSIUM CHLORIDE 7.45 MG/ML
10 INJECTION INTRAVENOUS
Status: COMPLETED | OUTPATIENT
Start: 2025-01-24 | End: 2025-01-24

## 2025-01-24 RX ORDER — ONDANSETRON HYDROCHLORIDE 2 MG/ML
4 INJECTION, SOLUTION INTRAVENOUS EVERY 8 HOURS PRN
Status: DISCONTINUED | OUTPATIENT
Start: 2025-01-25 | End: 2025-02-01 | Stop reason: HOSPADM

## 2025-01-24 RX ORDER — SODIUM CHLORIDE 9 MG/ML
INJECTION, SOLUTION INTRAVENOUS CONTINUOUS
Status: DISCONTINUED | OUTPATIENT
Start: 2025-01-25 | End: 2025-01-26

## 2025-01-24 RX ORDER — PROMETHAZINE HYDROCHLORIDE 25 MG/1
25 TABLET ORAL EVERY 6 HOURS PRN
Status: DISCONTINUED | OUTPATIENT
Start: 2025-01-25 | End: 2025-02-01 | Stop reason: HOSPADM

## 2025-01-24 RX ORDER — POLYETHYLENE GLYCOL 3350 17 G/17G
17 POWDER, FOR SOLUTION ORAL DAILY PRN
Status: DISCONTINUED | OUTPATIENT
Start: 2025-01-25 | End: 2025-02-01 | Stop reason: HOSPADM

## 2025-01-24 RX ORDER — GLUCAGON 1 MG
1 KIT INJECTION
Status: DISCONTINUED | OUTPATIENT
Start: 2025-01-25 | End: 2025-02-01 | Stop reason: HOSPADM

## 2025-01-24 RX ORDER — IBUPROFEN 200 MG
16 TABLET ORAL
Status: DISCONTINUED | OUTPATIENT
Start: 2025-01-25 | End: 2025-02-01 | Stop reason: HOSPADM

## 2025-01-24 RX ORDER — POTASSIUM CHLORIDE 20 MEQ/1
40 TABLET, EXTENDED RELEASE ORAL
Status: DISPENSED | OUTPATIENT
Start: 2025-01-24 | End: 2025-01-25

## 2025-01-24 RX ORDER — ENOXAPARIN SODIUM 100 MG/ML
40 INJECTION SUBCUTANEOUS EVERY 24 HOURS
Status: DISCONTINUED | OUTPATIENT
Start: 2025-01-25 | End: 2025-01-26

## 2025-01-24 RX ADMIN — AZITHROMYCIN MONOHYDRATE 500 MG: 500 INJECTION, POWDER, LYOPHILIZED, FOR SOLUTION INTRAVENOUS at 02:01

## 2025-01-24 RX ADMIN — POTASSIUM CHLORIDE 10 MEQ: 7.46 INJECTION, SOLUTION INTRAVENOUS at 02:01

## 2025-01-24 RX ADMIN — CEFTRIAXONE 1 G: 1 INJECTION, POWDER, FOR SOLUTION INTRAMUSCULAR; INTRAVENOUS at 02:01

## 2025-01-24 RX ADMIN — SODIUM CHLORIDE 1848 ML: 9 INJECTION, SOLUTION INTRAVENOUS at 02:01

## 2025-01-24 NOTE — Clinical Note
Diagnosis: Pneumonia [555821]   Reason for IP Medical Treatment  (Clinical interventions that can only be accomplished in the IP setting? ) :: Oxygen   Plans for Post-Acute care--if anticipated (pick the single best option):: A. No post acute care anticipated at this time   Special Needs:: No Special Needs [1]

## 2025-01-24 NOTE — ED PROVIDER NOTES
Encounter Date: 1/24/2025       History     Chief Complaint   Patient presents with    General Illness     Loose cough x 2 days. Generalized weakness. Vomited last night. 101 temp at home, motrin at 830 am.      The history is provided by the patient and a relative.   Cough  This is a recurrent problem. The current episode started two days ago. The problem occurs constantly. The problem has been unchanged. The cough is Productive of sputum. The maximum temperature recorded prior to her arrival was 100 - 100.9 F. Associated symptoms include chills. Pertinent negatives include no chest pain, no headaches, no sore throat and no shortness of breath.     Review of patient's allergies indicates:  No Known Allergies  Past Medical History:   Diagnosis Date    Abnormal Pap smear of vagina     Anxiety     Anxiety and depression     Cancer     tongue    CVA (cerebral vascular accident)     Essential hypertension 6/12/2018    Mental disorder     Other specified hypothyroidism 2/13/2021    Scoliosis      Past Surgical History:   Procedure Laterality Date    BLADDER SURGERY      CERVICAL SPINE SURGERY      colpocleisis  04/2018    Dr. Strong    feet Bilateral     HYSTERECTOMY      severe endometriosis    KNEE SURGERY      OOPHORECTOMY Bilateral     severe endometriosis     Family History   Problem Relation Name Age of Onset    Heart disease Mother      Diabetes Mother      Hypertension Sister      Breast cancer Maternal Aunt      Cancer Maternal Uncle      Colon cancer Maternal Uncle      Hypertension Sister      Hypertension Daughter      Stroke Daughter      Hypertension Daughter      Ovarian cancer Neg Hx       Social History     Tobacco Use    Smoking status: Never    Smokeless tobacco: Never   Substance Use Topics    Alcohol use: No     Alcohol/week: 0.0 standard drinks of alcohol    Drug use: No     Review of Systems   Constitutional:  Positive for chills. Negative for fever.   HENT: Negative.  Negative for congestion and  sore throat.    Eyes: Negative.    Respiratory:  Positive for cough. Negative for shortness of breath.    Cardiovascular:  Negative for chest pain.   Gastrointestinal:  Negative for abdominal pain, nausea and vomiting.   Genitourinary:  Negative for dysuria.   Neurological:  Negative for weakness, numbness and headaches.   Psychiatric/Behavioral:  Negative for confusion.        Physical Exam     Initial Vitals [01/24/25 1229]   BP Pulse Resp Temp SpO2   (!) 113/55 89 20 97.9 °F (36.6 °C) (!) 88 %      MAP       --         Physical Exam    Constitutional: She appears well-developed and well-nourished. She appears distressed.   HENT:   Head: Normocephalic and atraumatic.   Eyes: Conjunctivae are normal. Pupils are equal, round, and reactive to light.   Neck: Neck supple.   Normal range of motion.  Cardiovascular:  Normal rate, regular rhythm and normal heart sounds.           Pulmonary/Chest: She is in respiratory distress. She has rhonchi.   Abdominal: Abdomen is soft. Bowel sounds are normal. She exhibits no distension. There is no abdominal tenderness. There is no rebound.   Musculoskeletal:         General: No edema. Normal range of motion.      Cervical back: Normal range of motion and neck supple.     Neurological: She is alert and oriented to person, place, and time. She has normal strength.   Skin: Skin is warm and dry.   Psychiatric: She has a normal mood and affect.         ED Course   Critical Care    Date/Time: 1/24/2025 8:56 PM    Performed by: Ulysses Grimm MD  Authorized by: Ulysses Grimm MD  Direct patient critical care time: 12 minutes  Additional history critical care time: 7 minutes  Ordering / reviewing critical care time: 8 minutes  Documentation critical care time: 8 minutes  Total critical care time (exclusive of procedural time) : 35 minutes  Critical care time was exclusive of separately billable procedures and treating other patients.  Critical care was necessary to  treat or prevent imminent or life-threatening deterioration of the following conditions: respiratory failure and sepsis.  Critical care was time spent personally by me on the following activities: blood draw for specimens, development of treatment plan with patient or surrogate, discussions with consultants, evaluation of patient's response to treatment, examination of patient, obtaining history from patient or surrogate, ordering and performing treatments and interventions, ordering and review of laboratory studies, ordering and review of radiographic studies, pulse oximetry and re-evaluation of patient's condition.        Labs Reviewed   CBC W/ AUTO DIFFERENTIAL - Abnormal       Result Value    WBC 10.83      RBC 4.66      Hemoglobin 13.9      Hematocrit 40.6      MCV 87      MCH 29.8      MCHC 34.2      RDW 13.6      Platelets 129 (*)     MPV 10.2      Immature Granulocytes 0.2      Gran # (ANC) 10.1 (*)     Immature Grans (Abs) 0.02      Lymph # 0.4 (*)     Mono # 0.3      Eos # 0.0      Baso # 0.01      nRBC 0      Gran % 93.4 (*)     Lymph % 3.5 (*)     Mono % 2.8 (*)     Eosinophil % 0.0      Basophil % 0.1      Platelet Estimate Appears normal      Large/Giant Platelets Present      Differential Method Automated      Narrative:     Release to patient->Immediate   COMPREHENSIVE METABOLIC PANEL - Abnormal    Sodium 137      Potassium 2.8 (*)     Chloride 102      CO2 20 (*)     Glucose 240 (*)     BUN 14      Creatinine 0.8      Calcium 8.9      Total Protein 6.5      Albumin 3.5      Total Bilirubin 1.4 (*)     Alkaline Phosphatase 71      AST 24      ALT 20      eGFR >60.0      Anion Gap 15      Narrative:     Release to patient->Immediate   LACTIC ACID, PLASMA - Abnormal    Lactate (Lactic Acid) 3.7 (*)     Narrative:      Lactic Acid  critical result(s) called and verbal readback obtained   from Ratna Lewis, EMT by NITZA 01/24/2025 13:37   URINALYSIS, REFLEX TO URINE CULTURE - Abnormal    Specimen UA  Urine, Clean Catch      Color, UA Yellow      Appearance, UA Clear      pH, UA 6.0      Specific Gravity, UA 1.010      Protein, UA Negative      Glucose, UA Negative      Ketones, UA Negative      Bilirubin (UA) Negative      Occult Blood UA Negative      Nitrite, UA Negative      Urobilinogen, UA Negative      Leukocytes, UA 1+ (*)     Narrative:     Specimen Source->Urine   PROCALCITONIN - Abnormal    Procalcitonin 0.71 (*)     Narrative:     Release to patient->Immediate   TROPONIN I - Abnormal    Troponin I 0.029 (*)     Narrative:     Release to patient->Immediate   B-TYPE NATRIURETIC PEPTIDE - Abnormal     (*)     Narrative:     Release to patient->Immediate   LIPASE    Lipase 7      Narrative:     Release to patient->Immediate   LACTIC ACID, PLASMA    Lactate (Lactic Acid) 1.1     URINALYSIS MICROSCOPIC    RBC, UA 1      WBC, UA 4      Bacteria Rare      Microscopic Comment SEE COMMENT      Narrative:     Specimen Source->Urine   SARS-COV-2 RDRP GENE    POC Rapid COVID Negative       Acceptable Yes     POCT INFLUENZA A/B MOLECULAR    POC Molecular Influenza A Ag Negative      POC Molecular Influenza B Ag Negative       Acceptable Yes       EKG Readings: (Independently Interpreted)   Rhythm: Normal Sinus Rhythm. Heart Rate: 82. Ectopy: No Ectopy. Conduction: Normal. ST Segments: Normal ST Segments. T Waves: Normal. Clinical Impression: Normal Sinus Rhythm     ECG Results              EKG 12-lead (In process)        Collection Time Result Time QRS Duration OHS QTC Calculation    01/24/25 12:48:08 01/24/25 13:25:29 90 500                     In process by Interface, Lab In [facility] (01/24/25 13:25:37)                   Narrative:    Test Reason : Z13.6,    Vent. Rate :  82 BPM     Atrial Rate :  82 BPM     P-R Int : 174 ms          QRS Dur :  90 ms      QT Int : 428 ms       P-R-T Axes :  50 -26  32 degrees    QTcB Int : 500 ms    Normal sinus rhythm  Moderate voltage  criteria for LVH, may be normal variant ( R in aVL ,  Woodson product )  Prolonged QT  Abnormal ECG  No previous ECGs available    Referred By: AAAREFERRAL SELF           Confirmed By:                                   Imaging Results              X-Ray Chest AP Portable (Final result)  Result time 01/24/25 12:53:34      Final result by Taco Kaur MD (01/24/25 12:53:34)                   Impression:      Suboptimal inspiration with mild bilateral patchy opacity described above.  Early developing multilobar pneumonia cannot be excluded.  Follow-up standard PA and lateral views of the chest would be helpful for further evaluation when feasible.      Electronically signed by: Taco Kaur  Date:    01/24/2025  Time:    12:53               Narrative:    EXAMINATION:  XR CHEST AP PORTABLE    CLINICAL HISTORY:  Sepsis;    TECHNIQUE:  Single frontal view of the chest was performed.    COMPARISON:  04/05/2018    FINDINGS:  Chest expansion is suboptimal.  Heart size is normal.  There is mild patchy opacity which may represent early developing lung consolidation or atelectasis in the left infrahilar region and medially at the right lung base.  No free pleural fluid is visible.  Pulmonary vasculature is normal.  Postsurgical changes are again seen in the cervical spine.                                       Medications   potassium chloride SA CR tablet 40 mEq (40 mEq Oral Not Given 1/24/25 1400)   cefTRIAXone injection 1 g (1 g Intravenous Given 1/25/25 1503)   sodium chloride 0.9% flush 3 mL (has no administration in time range)   melatonin tablet 6 mg (has no administration in time range)   ondansetron injection 4 mg (has no administration in time range)   promethazine tablet 25 mg (has no administration in time range)   polyethylene glycol packet 17 g (has no administration in time range)   acetaminophen tablet 650 mg (650 mg Oral Given 1/25/25 2131)   simethicone chewable tablet 80 mg (has no  administration in time range)   aluminum-magnesium hydroxide-simethicone 200-200-20 mg/5 mL suspension 30 mL (has no administration in time range)   acetaminophen suppository 650 mg (has no administration in time range)   naloxone 0.4 mg/mL injection 0.02 mg (has no administration in time range)   glucose chewable tablet 16 g (has no administration in time range)   glucose chewable tablet 24 g (has no administration in time range)   dextrose 50% injection 12.5 g (has no administration in time range)   dextrose 50% injection 25 g (has no administration in time range)   glucagon (human recombinant) injection 1 mg (has no administration in time range)   0.9% NaCl infusion ( Intravenous New Bag 1/25/25 1200)   enoxaparin injection 40 mg (40 mg Subcutaneous Given 1/25/25 1604)   aspirin EC tablet 81 mg (81 mg Oral Given 1/25/25 0842)   atorvastatin tablet 10 mg (10 mg Oral Given 1/25/25 2119)   clopidogreL tablet 75 mg (75 mg Oral Given 1/25/25 0627)   levothyroxine tablet 50 mcg (50 mcg Oral Given 1/25/25 2119)   metoprolol succinate (TOPROL-XL) 24 hr tablet 50 mg (50 mg Oral Given 1/25/25 0842)   pantoprazole EC tablet 40 mg (40 mg Oral Given 1/25/25 0842)   sertraline tablet 100 mg (100 mg Oral Given 1/25/25 2119)   glucose chewable tablet 16 g (has no administration in time range)   glucose chewable tablet 24 g (has no administration in time range)   dextrose 50% injection 12.5 g (has no administration in time range)   dextrose 50% injection 25 g (has no administration in time range)   glucagon (human recombinant) injection 1 mg (has no administration in time range)   insulin aspart U-100 pen 0-5 Units (2 Units Subcutaneous Given 1/25/25 1709)   ALPRAZolam tablet 0.5 mg (0.5 mg Oral Given 1/25/25 2131)   potassium chloride SA CR tablet 40 mEq (40 mEq Oral Given 1/25/25 1153)   electrolytes-dextrose (Pedialyte) oral solution 400 mL (400 mLs Oral Given 1/25/25 2118)   albuterol nebulizer solution 2.5 mg (2.5 mg  Nebulization Given 1/25/25 1921)   acetylcysteine 100 mg/ml (10%) solution 4 mL (4 mLs Nebulization Given 1/25/25 1921)   furosemide injection 20 mg (20 mg Intravenous Given 1/25/25 1503)   doxycycline tablet 100 mg (100 mg Oral Given 1/25/25 2119)   methylPREDNISolone sodium succinate injection 40 mg (40 mg Intravenous Given 1/25/25 2118)   cefTRIAXone injection 1 g (1 g Intravenous Given 1/24/25 1400)   azithromycin (ZITHROMAX) 500 mg in 0.9% NaCl 250 mL IVPB (admixture device) (0 mg Intravenous Stopped 1/24/25 1507)   sodium chloride 0.9% bolus 1,848 mL 1,848 mL (0 mLs Intravenous Stopped 1/24/25 1533)   potassium chloride 10 mEq in 100 mL IVPB (0 mEq Intravenous Stopped 1/24/25 1534)   iohexoL (OMNIPAQUE 350) injection 100 mL (100 mLs Intravenous Given 1/25/25 1500)     Medical Decision Making  Ddx pneumonia, sepsis, uti    Problems Addressed:  Pneumonia: acute illness or injury    Amount and/or Complexity of Data Reviewed  Labs: ordered.  Radiology: ordered.  ECG/medicine tests: ordered and independent interpretation performed. Decision-making details documented in ED Course.    Risk  Prescription drug management.  Decision regarding hospitalization.                                      Clinical Impression:  Final diagnoses:  [Z13.6] Screening for cardiovascular condition  [J18.9] Pneumonia (Primary)          ED Disposition Condition    Admit Stable                Ulysses Grimm MD  01/25/25 3334

## 2025-01-25 PROBLEM — I10 ESSENTIAL HYPERTENSION: Chronic | Status: ACTIVE | Noted: 2018-06-12

## 2025-01-25 PROBLEM — R94.31 PROLONGED Q-T INTERVAL ON ECG: Status: ACTIVE | Noted: 2025-01-25

## 2025-01-25 PROBLEM — R73.9 HYPERGLYCEMIA: Status: ACTIVE | Noted: 2025-01-25

## 2025-01-25 PROBLEM — J18.9 MULTIFOCAL PNEUMONIA: Status: ACTIVE | Noted: 2025-01-25

## 2025-01-25 PROBLEM — Z86.73 HISTORY OF CVA (CEREBROVASCULAR ACCIDENT): Status: ACTIVE | Noted: 2025-01-25

## 2025-01-25 PROBLEM — E87.6 HYPOKALEMIA: Status: ACTIVE | Noted: 2025-01-25

## 2025-01-25 PROBLEM — E03.8 OTHER SPECIFIED HYPOTHYROIDISM: Status: ACTIVE | Noted: 2021-02-13

## 2025-01-25 LAB
ADENOVIRUS: NOT DETECTED
ALBUMIN SERPL BCP-MCNC: 2.7 G/DL (ref 3.5–5.2)
ALP SERPL-CCNC: 63 U/L (ref 40–150)
ALT SERPL W/O P-5'-P-CCNC: 14 U/L (ref 10–44)
ANION GAP SERPL CALC-SCNC: 12 MMOL/L (ref 8–16)
AORTIC ROOT ANNULUS: 2.95 CM
ASCENDING AORTA: 2.75 CM
AST SERPL-CCNC: 23 U/L (ref 10–40)
AV INDEX (PROSTH): 0.69
AV MEAN GRADIENT: 9 MMHG
AV PEAK GRADIENT: 18 MMHG
AV VALVE AREA BY VELOCITY RATIO: 2.3 CM²
AV VALVE AREA: 2.4 CM²
AV VELOCITY RATIO: 0.67
BASOPHILS # BLD AUTO: 0.01 K/UL (ref 0–0.2)
BASOPHILS NFR BLD: 0.1 % (ref 0–1.9)
BILIRUB SERPL-MCNC: 1 MG/DL (ref 0.1–1)
BORDETELLA PARAPERTUSSIS (IS1001): NOT DETECTED
BORDETELLA PERTUSSIS (PTXP): NOT DETECTED
BSA FOR ECHO PROCEDURE: 1.84 M2
BUN SERPL-MCNC: 11 MG/DL (ref 8–23)
CALCIUM SERPL-MCNC: 9 MG/DL (ref 8.7–10.5)
CHLAMYDIA PNEUMONIAE: NOT DETECTED
CHLORIDE SERPL-SCNC: 112 MMOL/L (ref 95–110)
CO2 SERPL-SCNC: 22 MMOL/L (ref 23–29)
CORONAVIRUS 229E, COMMON COLD VIRUS: NOT DETECTED
CORONAVIRUS HKU1, COMMON COLD VIRUS: NOT DETECTED
CORONAVIRUS NL63, COMMON COLD VIRUS: NOT DETECTED
CORONAVIRUS OC43, COMMON COLD VIRUS: NOT DETECTED
CREAT SERPL-MCNC: 0.6 MG/DL (ref 0.5–1.4)
CV ECHO LV RWT: 0.59 CM
DIFFERENTIAL METHOD BLD: ABNORMAL
DOP CALC AO PEAK VEL: 2.1 M/S
DOP CALC AO VTI: 43.2 CM
DOP CALC LVOT AREA: 3.5 CM2
DOP CALC LVOT DIAMETER: 2.1 CM
DOP CALC LVOT PEAK VEL: 1.4 M/S
DOP CALC LVOT STROKE VOLUME: 103.9 CM3
DOP CALC RVOT PEAK VEL: 0.9 M/S
DOP CALC RVOT VTI: 23.1 CM
DOP CALCLVOT PEAK VEL VTI: 30 CM
E WAVE DECELERATION TIME: 189 MSEC
E/A RATIO: 1.23
E/E' RATIO: 11 M/S
ECHO LV POSTERIOR WALL: 1.2 CM (ref 0.6–1.1)
EOSINOPHIL # BLD AUTO: 0 K/UL (ref 0–0.5)
EOSINOPHIL NFR BLD: 0.1 % (ref 0–8)
ERYTHROCYTE [DISTWIDTH] IN BLOOD BY AUTOMATED COUNT: 14 % (ref 11.5–14.5)
EST. GFR  (NO RACE VARIABLE): >60 ML/MIN/1.73 M^2
ESTIMATED AVG GLUCOSE: 97 MG/DL (ref 68–131)
FLUBV RNA NPH QL NAA+NON-PROBE: NOT DETECTED
FRACTIONAL SHORTENING: 31.7 % (ref 28–44)
GLUCOSE SERPL-MCNC: 111 MG/DL (ref 70–110)
HBA1C MFR BLD: 5 % (ref 4–5.6)
HCT VFR BLD AUTO: 36.3 % (ref 37–48.5)
HCV AB SERPL QL IA: NEGATIVE
HGB BLD-MCNC: 11.9 G/DL (ref 12–16)
HIV 1+2 AB+HIV1 P24 AG SERPL QL IA: NEGATIVE
HPIV1 RNA NPH QL NAA+NON-PROBE: NOT DETECTED
HPIV2 RNA NPH QL NAA+NON-PROBE: NOT DETECTED
HPIV3 RNA NPH QL NAA+NON-PROBE: NOT DETECTED
HPIV4 RNA NPH QL NAA+NON-PROBE: NOT DETECTED
HUMAN METAPNEUMOVIRUS: NOT DETECTED
IMM GRANULOCYTES # BLD AUTO: 0.02 K/UL (ref 0–0.04)
IMM GRANULOCYTES NFR BLD AUTO: 0.3 % (ref 0–0.5)
INFLUENZA A (SUBTYPES H1,H1-2009,H3): NOT DETECTED
INTERVENTRICULAR SEPTUM: 1.2 CM (ref 0.6–1.1)
IVRT: 60 MSEC
LA MAJOR: 5 CM
LA MINOR: 5.1 CM
LA WIDTH: 3.9 CM
LEFT ATRIUM AREA SYSTOLIC (APICAL 2 CHAMBER): 18.28 CM2
LEFT ATRIUM AREA SYSTOLIC (APICAL 4 CHAMBER): 20.1 CM2
LEFT ATRIUM SIZE: 3.7 CM
LEFT ATRIUM VOLUME INDEX MOD: 31 ML/M2
LEFT ATRIUM VOLUME INDEX: 34 ML/M2
LEFT ATRIUM VOLUME MOD: 56 ML
LEFT ATRIUM VOLUME: 62 CM3
LEFT INTERNAL DIMENSION IN SYSTOLE: 2.8 CM (ref 2.1–4)
LEFT VENTRICLE DIASTOLIC VOLUME INDEX: 40.61 ML/M2
LEFT VENTRICLE DIASTOLIC VOLUME: 74.31 ML
LEFT VENTRICLE END SYSTOLIC VOLUME APICAL 2 CHAMBER: 50.59 ML
LEFT VENTRICLE END SYSTOLIC VOLUME APICAL 4 CHAMBER: 58.82 ML
LEFT VENTRICLE MASS INDEX: 93.9 G/M2
LEFT VENTRICLE SYSTOLIC VOLUME INDEX: 16.1 ML/M2
LEFT VENTRICLE SYSTOLIC VOLUME: 29.4 ML
LEFT VENTRICULAR INTERNAL DIMENSION IN DIASTOLE: 4.1 CM (ref 3.5–6)
LEFT VENTRICULAR MASS: 171.7 G
LV LATERAL E/E' RATIO: 9 M/S
LV SEPTAL E/E' RATIO: 13.5 M/S
LVED V (TEICH): 74.31 ML
LVES V (TEICH): 29.4 ML
LVOT MG: 4.5 MMHG
LVOT MV: 1 CM/S
LYMPHOCYTES # BLD AUTO: 0.7 K/UL (ref 1–4.8)
LYMPHOCYTES NFR BLD: 9.5 % (ref 18–48)
MAGNESIUM SERPL-MCNC: 1.8 MG/DL (ref 1.6–2.6)
MCH RBC QN AUTO: 29.2 PG (ref 27–31)
MCHC RBC AUTO-ENTMCNC: 32.8 G/DL (ref 32–36)
MCV RBC AUTO: 89 FL (ref 82–98)
MONOCYTES # BLD AUTO: 0.3 K/UL (ref 0.3–1)
MONOCYTES NFR BLD: 3.9 % (ref 4–15)
MV PEAK A VEL: 0.88 M/S
MV PEAK E VEL: 1.08 M/S
MYCOPLASMA PNEUMONIAE: NOT DETECTED
NEUTROPHILS # BLD AUTO: 6.7 K/UL (ref 1.8–7.7)
NEUTROPHILS NFR BLD: 86.1 % (ref 38–73)
NRBC BLD-RTO: 0 /100 WBC
OHS CV RV/LV RATIO: 0.71 CM
PISA TR MAX VEL: 3 M/S
PLATELET # BLD AUTO: 107 K/UL (ref 150–450)
PLATELET BLD QL SMEAR: ABNORMAL
PMV BLD AUTO: 10.4 FL (ref 9.2–12.9)
POCT GLUCOSE: 105 MG/DL (ref 70–110)
POCT GLUCOSE: 132 MG/DL (ref 70–110)
POCT GLUCOSE: 149 MG/DL (ref 70–110)
POCT GLUCOSE: 220 MG/DL (ref 70–110)
POTASSIUM SERPL-SCNC: 3.3 MMOL/L (ref 3.5–5.1)
PROT SERPL-MCNC: 5.2 G/DL (ref 6–8.4)
PULM VEIN S/D RATIO: 1.4
PV MEAN GRADIENT: 2 MMHG
PV MV: 1.07 M/S
PV PEAK D VEL: 0.53 M/S
PV PEAK GRADIENT: 8 MMHG
PV PEAK S VEL: 0.74 M/S
PV PEAK VELOCITY: 1.37 M/S
RA MAJOR: 5.13 CM
RA PRESSURE ESTIMATED: 3 MMHG
RA WIDTH: 3.6 CM
RBC # BLD AUTO: 4.08 M/UL (ref 4–5.4)
RESPIRATORY INFECTION PANEL SOURCE: ABNORMAL
RIGHT VENTRICLE DIASTOLIC BASEL DIMENSION: 2.9 CM
RIGHT VENTRICULAR END-DIASTOLIC DIMENSION: 2.86 CM
RSV RNA NPH QL NAA+NON-PROBE: NOT DETECTED
RV TB RVSP: 6 MMHG
RV+EV RNA NPH QL NAA+NON-PROBE: DETECTED
SARS-COV-2 RNA RESP QL NAA+PROBE: NOT DETECTED
SODIUM SERPL-SCNC: 146 MMOL/L (ref 136–145)
STJ: 2.54 CM
TDI LATERAL: 0.12 M/S
TDI SEPTAL: 0.08 M/S
TDI: 0.1 M/S
TR MAX PG: 36 MMHG
TRICUSPID ANNULAR PLANE SYSTOLIC EXCURSION: 1.95 CM
TV REST PULMONARY ARTERY PRESSURE: 39 MMHG
WBC # BLD AUTO: 7.72 K/UL (ref 3.9–12.7)
Z-SCORE OF LEFT VENTRICULAR DIMENSION IN END DIASTOLE: -2.13
Z-SCORE OF LEFT VENTRICULAR DIMENSION IN END SYSTOLE: -0.88

## 2025-01-25 PROCEDURE — 27000221 HC OXYGEN, UP TO 24 HOURS

## 2025-01-25 PROCEDURE — 63600175 PHARM REV CODE 636 W HCPCS: Mod: JZ,TB | Performed by: FAMILY MEDICINE

## 2025-01-25 PROCEDURE — 80053 COMPREHEN METABOLIC PANEL: CPT | Performed by: NURSE PRACTITIONER

## 2025-01-25 PROCEDURE — 25000242 PHARM REV CODE 250 ALT 637 W/ HCPCS: Performed by: FAMILY MEDICINE

## 2025-01-25 PROCEDURE — 25000003 PHARM REV CODE 250: Performed by: FAMILY MEDICINE

## 2025-01-25 PROCEDURE — 83036 HEMOGLOBIN GLYCOSYLATED A1C: CPT | Performed by: NURSE PRACTITIONER

## 2025-01-25 PROCEDURE — 21400001 HC TELEMETRY ROOM

## 2025-01-25 PROCEDURE — 25000003 PHARM REV CODE 250: Performed by: NURSE PRACTITIONER

## 2025-01-25 PROCEDURE — 83735 ASSAY OF MAGNESIUM: CPT | Performed by: NURSE PRACTITIONER

## 2025-01-25 PROCEDURE — 25500020 PHARM REV CODE 255: Performed by: FAMILY MEDICINE

## 2025-01-25 PROCEDURE — 36415 COLL VENOUS BLD VENIPUNCTURE: CPT | Performed by: NURSE PRACTITIONER

## 2025-01-25 PROCEDURE — 63600175 PHARM REV CODE 636 W HCPCS: Performed by: NURSE PRACTITIONER

## 2025-01-25 PROCEDURE — 87798 DETECT AGENT NOS DNA AMP: CPT | Mod: 59 | Performed by: FAMILY MEDICINE

## 2025-01-25 PROCEDURE — 85025 COMPLETE CBC W/AUTO DIFF WBC: CPT | Performed by: NURSE PRACTITIONER

## 2025-01-25 PROCEDURE — 94761 N-INVAS EAR/PLS OXIMETRY MLT: CPT

## 2025-01-25 PROCEDURE — 94640 AIRWAY INHALATION TREATMENT: CPT

## 2025-01-25 RX ORDER — POTASSIUM CHLORIDE 20 MEQ/1
40 TABLET, EXTENDED RELEASE ORAL DAILY
Status: DISCONTINUED | OUTPATIENT
Start: 2025-01-25 | End: 2025-02-01 | Stop reason: HOSPADM

## 2025-01-25 RX ORDER — ASPIRIN 81 MG/1
81 TABLET ORAL DAILY
Status: DISCONTINUED | OUTPATIENT
Start: 2025-01-25 | End: 2025-02-01 | Stop reason: HOSPADM

## 2025-01-25 RX ORDER — ATORVASTATIN CALCIUM 10 MG/1
10 TABLET, FILM COATED ORAL NIGHTLY
Status: DISCONTINUED | OUTPATIENT
Start: 2025-01-25 | End: 2025-02-01 | Stop reason: HOSPADM

## 2025-01-25 RX ORDER — IBUPROFEN 200 MG
16 TABLET ORAL
Status: DISCONTINUED | OUTPATIENT
Start: 2025-01-25 | End: 2025-01-26

## 2025-01-25 RX ORDER — PANTOPRAZOLE SODIUM 40 MG/1
40 TABLET, DELAYED RELEASE ORAL DAILY
Status: DISCONTINUED | OUTPATIENT
Start: 2025-01-25 | End: 2025-02-01 | Stop reason: HOSPADM

## 2025-01-25 RX ORDER — FUROSEMIDE 10 MG/ML
20 INJECTION INTRAMUSCULAR; INTRAVENOUS DAILY
Status: DISCONTINUED | OUTPATIENT
Start: 2025-01-25 | End: 2025-02-01

## 2025-01-25 RX ORDER — INSULIN ASPART 100 [IU]/ML
0-5 INJECTION, SOLUTION INTRAVENOUS; SUBCUTANEOUS
Status: DISCONTINUED | OUTPATIENT
Start: 2025-01-25 | End: 2025-01-26

## 2025-01-25 RX ORDER — LEVOTHYROXINE SODIUM 50 UG/1
50 TABLET ORAL NIGHTLY
Status: DISCONTINUED | OUTPATIENT
Start: 2025-01-25 | End: 2025-01-26

## 2025-01-25 RX ORDER — ALPRAZOLAM 1 MG/1
1 TABLET ORAL 2 TIMES DAILY PRN
Status: DISCONTINUED | OUTPATIENT
Start: 2025-01-25 | End: 2025-01-25

## 2025-01-25 RX ORDER — CLOPIDOGREL BISULFATE 75 MG/1
75 TABLET ORAL EVERY MORNING
Status: DISCONTINUED | OUTPATIENT
Start: 2025-01-25 | End: 2025-02-01 | Stop reason: HOSPADM

## 2025-01-25 RX ORDER — ACETYLCYSTEINE 100 MG/ML
4 SOLUTION ORAL; RESPIRATORY (INHALATION) 4 TIMES DAILY
Status: DISCONTINUED | OUTPATIENT
Start: 2025-01-25 | End: 2025-01-26 | Stop reason: ALTCHOICE

## 2025-01-25 RX ORDER — IBUPROFEN 200 MG
24 TABLET ORAL
Status: DISCONTINUED | OUTPATIENT
Start: 2025-01-25 | End: 2025-01-26

## 2025-01-25 RX ORDER — ELECTROLYTES/DEXTROSE
400 SOLUTION, ORAL ORAL
Status: DISCONTINUED | OUTPATIENT
Start: 2025-01-25 | End: 2025-01-27

## 2025-01-25 RX ORDER — DOXYCYCLINE HYCLATE 100 MG
100 TABLET ORAL EVERY 12 HOURS
Status: DISCONTINUED | OUTPATIENT
Start: 2025-01-25 | End: 2025-02-01 | Stop reason: HOSPADM

## 2025-01-25 RX ORDER — GLUCAGON 1 MG
1 KIT INJECTION
Status: DISCONTINUED | OUTPATIENT
Start: 2025-01-25 | End: 2025-01-26

## 2025-01-25 RX ORDER — ALPRAZOLAM 0.5 MG/1
0.5 TABLET ORAL 2 TIMES DAILY PRN
Status: DISCONTINUED | OUTPATIENT
Start: 2025-01-25 | End: 2025-02-01 | Stop reason: HOSPADM

## 2025-01-25 RX ORDER — SERTRALINE HYDROCHLORIDE 50 MG/1
100 TABLET, FILM COATED ORAL 2 TIMES DAILY
Status: DISCONTINUED | OUTPATIENT
Start: 2025-01-25 | End: 2025-02-01 | Stop reason: HOSPADM

## 2025-01-25 RX ORDER — ALBUTEROL SULFATE 0.83 MG/ML
2.5 SOLUTION RESPIRATORY (INHALATION)
Status: DISCONTINUED | OUTPATIENT
Start: 2025-01-25 | End: 2025-02-01 | Stop reason: HOSPADM

## 2025-01-25 RX ORDER — METOPROLOL SUCCINATE 50 MG/1
50 TABLET, EXTENDED RELEASE ORAL DAILY
Status: DISCONTINUED | OUTPATIENT
Start: 2025-01-25 | End: 2025-02-01 | Stop reason: HOSPADM

## 2025-01-25 RX ADMIN — LEVOTHYROXINE SODIUM 50 MCG: 0.05 TABLET ORAL at 09:01

## 2025-01-25 RX ADMIN — SODIUM CHLORIDE: 9 INJECTION, SOLUTION INTRAVENOUS at 12:01

## 2025-01-25 RX ADMIN — METOPROLOL SUCCINATE 50 MG: 50 TABLET, EXTENDED RELEASE ORAL at 08:01

## 2025-01-25 RX ADMIN — ALBUTEROL SULFATE 2.5 MG: 0.83 SOLUTION RESPIRATORY (INHALATION) at 07:01

## 2025-01-25 RX ADMIN — ACETYLCYSTEINE 4 ML: 100 INHALANT RESPIRATORY (INHALATION) at 07:01

## 2025-01-25 RX ADMIN — ENOXAPARIN SODIUM 40 MG: 40 INJECTION SUBCUTANEOUS at 04:01

## 2025-01-25 RX ADMIN — CEFTRIAXONE 1 G: 1 INJECTION, POWDER, FOR SOLUTION INTRAMUSCULAR; INTRAVENOUS at 03:01

## 2025-01-25 RX ADMIN — METHYLPREDNISOLONE SODIUM SUCCINATE 40 MG: 40 INJECTION, POWDER, FOR SOLUTION INTRAMUSCULAR; INTRAVENOUS at 09:01

## 2025-01-25 RX ADMIN — ASPIRIN 81 MG: 81 TABLET, COATED ORAL at 08:01

## 2025-01-25 RX ADMIN — INSULIN ASPART 2 UNITS: 100 INJECTION, SOLUTION INTRAVENOUS; SUBCUTANEOUS at 05:01

## 2025-01-25 RX ADMIN — ATORVASTATIN CALCIUM 10 MG: 10 TABLET, FILM COATED ORAL at 03:01

## 2025-01-25 RX ADMIN — DOXYCYCLINE HYCLATE 100 MG: 100 TABLET, COATED ORAL at 09:01

## 2025-01-25 RX ADMIN — CLOPIDOGREL BISULFATE 75 MG: 75 TABLET ORAL at 06:01

## 2025-01-25 RX ADMIN — PANTOPRAZOLE SODIUM 40 MG: 40 TABLET, DELAYED RELEASE ORAL at 08:01

## 2025-01-25 RX ADMIN — FUROSEMIDE 20 MG: 10 INJECTION, SOLUTION INTRAMUSCULAR; INTRAVENOUS at 03:01

## 2025-01-25 RX ADMIN — SERTRALINE HYDROCHLORIDE 100 MG: 50 TABLET ORAL at 08:01

## 2025-01-25 RX ADMIN — ALBUTEROL SULFATE 2.5 MG: 0.83 SOLUTION RESPIRATORY (INHALATION) at 04:01

## 2025-01-25 RX ADMIN — Medication 400 ML: at 09:01

## 2025-01-25 RX ADMIN — ALPRAZOLAM 0.5 MG: 0.5 TABLET ORAL at 09:01

## 2025-01-25 RX ADMIN — POTASSIUM CHLORIDE 40 MEQ: 1500 TABLET, EXTENDED RELEASE ORAL at 11:01

## 2025-01-25 RX ADMIN — Medication 400 ML: at 04:01

## 2025-01-25 RX ADMIN — ATORVASTATIN CALCIUM 10 MG: 10 TABLET, FILM COATED ORAL at 09:01

## 2025-01-25 RX ADMIN — ACETYLCYSTEINE 4 ML: 100 INHALANT RESPIRATORY (INHALATION) at 04:01

## 2025-01-25 RX ADMIN — IOHEXOL 100 ML: 350 INJECTION, SOLUTION INTRAVENOUS at 03:01

## 2025-01-25 RX ADMIN — ACETAMINOPHEN 650 MG: 325 TABLET ORAL at 09:01

## 2025-01-25 RX ADMIN — SERTRALINE HYDROCHLORIDE 100 MG: 50 TABLET ORAL at 09:01

## 2025-01-25 RX ADMIN — LEVOTHYROXINE SODIUM 50 MCG: 0.05 TABLET ORAL at 03:01

## 2025-01-25 RX ADMIN — Medication 400 ML: at 11:01

## 2025-01-25 NOTE — PLAN OF CARE
A236/A236 HARDY Segura is a 74 y.o.female admitted on 1/24/2025 for Multifocal pneumonia   Code Status: Full Code MRN: [id number]   Review of patient's allergies indicates:  No Known Allergies  Past Medical History:   Diagnosis Date    Abnormal Pap smear of vagina     Anxiety     Anxiety and depression     Cancer     tongue    CVA (cerebral vascular accident)     Essential hypertension 6/12/2018    Mental disorder     Other specified hypothyroidism 2/13/2021    Scoliosis       PRN meds    acetaminophen, 650 mg, Q6H PRN  acetaminophen, 650 mg, Q6H PRN  ALPRAZolam, 0.5 mg, BID PRN  aluminum-magnesium hydroxide-simethicone, 30 mL, QID PRN  dextrose 50%, 12.5 g, PRN  dextrose 50%, 12.5 g, PRN  dextrose 50%, 25 g, PRN  dextrose 50%, 25 g, PRN  glucagon (human recombinant), 1 mg, PRN  glucagon (human recombinant), 1 mg, PRN  glucose, 16 g, PRN  glucose, 16 g, PRN  glucose, 24 g, PRN  glucose, 24 g, PRN  insulin aspart U-100, 0-5 Units, QID (AC + HS) PRN  melatonin, 6 mg, Nightly PRN  naloxone, 0.02 mg, PRN  ondansetron, 4 mg, Q8H PRN  polyethylene glycol, 17 g, Daily PRN  promethazine, 25 mg, Q6H PRN  simethicone, 1 tablet, QID PRN  sodium chloride 0.9%, 3 mL, Q12H PRN      Chart check completed. Will continue plan of care.         Debbie Coma Scale Score: 15     Lead Monitored: Lead II Rhythm: normal sinus rhythm    Cardiac/Telemetry Box Number: [id number]  VTE Core Measure: Pharmacological prophylaxis initiated/maintained Last Bowel Movement: 01/24/25  Diet Adult Regular Soft & Bite Sized (IDDSI Level 6)     Keron Score: 18  Fall Risk Score: 17  Accucheck []   Freq?      Lines/Drains/Airways       None

## 2025-01-25 NOTE — ASSESSMENT & PLAN NOTE
Chronic, controlled.  Latest blood pressure and vitals reviewed-   Temp:  [97.6 °F (36.4 °C)-98.5 °F (36.9 °C)]   Pulse:  [71-89]   Resp:  [18-28]   BP: (104-146)/(51-76)   SpO2:  [88 %-96 %] .   Home meds for hypertension were reviewed and noted below.   Hypertension Medications               metoprolol succinate (TOPROL-XL) 50 MG 24 hr tablet Take 1 tablet by mouth once daily.            While in the hospital, will manage blood pressure as follows; Continue home antihypertensive regimen    Will utilize p.r.n. blood pressure medication only if patient's blood pressure greater than  160/90 and she develops symptoms such as worsening chest pain or shortness of breath.

## 2025-01-25 NOTE — SUBJECTIVE & OBJECTIVE
Past Medical History:   Diagnosis Date    Abnormal Pap smear of vagina     Anxiety     Anxiety and depression     Cancer     tongue    CVA (cerebral vascular accident)     Essential hypertension 6/12/2018    Mental disorder     Other specified hypothyroidism 2/13/2021    Scoliosis        Past Surgical History:   Procedure Laterality Date    BLADDER SURGERY      CERVICAL SPINE SURGERY      colpocleisis  04/2018    Dr. Strong    feet Bilateral     HYSTERECTOMY      severe endometriosis    KNEE SURGERY      OOPHORECTOMY Bilateral     severe endometriosis       Review of patient's allergies indicates:  No Known Allergies    No current facility-administered medications on file prior to encounter.     Current Outpatient Medications on File Prior to Encounter   Medication Sig    alprazolam (XANAX) 1 MG tablet Take 1 tablet (1 mg total) by mouth 2 (two) times daily as needed for Anxiety.    aspirin (ECOTRIN) 81 MG EC tablet Take 81 mg by mouth once daily.    atorvastatin (LIPITOR) 10 MG tablet Take 1 tablet by mouth every evening.    cholecalciferol, vitamin D3, 10,000 unit Cap Take 1 capsule by mouth.    clopidogreL (PLAVIX) 75 mg tablet Take 1 tablet by mouth every morning.    gabapentin (NEURONTIN) 100 MG capsule Take 100 mg by mouth 2 (two) times daily.    levothyroxine (SYNTHROID) 50 MCG tablet Take 50 mcg by mouth.    metoprolol succinate (TOPROL-XL) 50 MG 24 hr tablet Take 1 tablet by mouth once daily.    pantoprazole (PROTONIX) 40 MG tablet Take 40 mg by mouth.    polyethylene glycol (GLYCOLAX) 17 gram PwPk Take by mouth.    sennosides (SENNA) 8.6 mg Cap Take 1 tablet by mouth daily as needed (Constipation).    sertraline (ZOLOFT) 100 MG tablet Take 100 mg by mouth 2 (two) times daily.    baclofen (LIORESAL) 10 MG tablet Take 10 mg by mouth.    clobetasol 0.05% (TEMOVATE) 0.05 % Oint APPLY TOPICALLY 2 (TWO) TIMES DAILY.    zolpidem (AMBIEN) 10 mg Tab Take 10 mg by mouth nightly as needed.     Family History        Problem Relation (Age of Onset)    Breast cancer Maternal Aunt    Cancer Maternal Uncle    Colon cancer Maternal Uncle    Diabetes Mother    Heart disease Mother    Hypertension Sister, Sister, Daughter, Daughter    Stroke Daughter          Tobacco Use    Smoking status: Never    Smokeless tobacco: Never   Substance and Sexual Activity    Alcohol use: No     Alcohol/week: 0.0 standard drinks of alcohol    Drug use: No    Sexual activity: Not Currently     Review of Systems   Constitutional:  Positive for fatigue and fever. Negative for chills and diaphoresis.   Respiratory:  Positive for cough. Negative for shortness of breath.    Cardiovascular:  Negative for chest pain, palpitations and leg swelling.   Gastrointestinal:  Negative for diarrhea, nausea and vomiting.   All other systems reviewed and are negative.    Objective:     Vital Signs (Most Recent):  Temp: 98.1 °F (36.7 °C) (01/25/25 0456)  Pulse: 72 (01/25/25 0456)  Resp: 20 (01/25/25 0456)  BP: (!) 109/55 (01/25/25 0456)  SpO2: (!) 90 % (01/25/25 0456) Vital Signs (24h Range):  Temp:  [97.6 °F (36.4 °C)-98.5 °F (36.9 °C)] 98.1 °F (36.7 °C)  Pulse:  [71-89] 72  Resp:  [18-28] 20  SpO2:  [88 %-96 %] 90 %  BP: (104-146)/(51-76) 109/55     Weight: 70.3 kg (155 lb)  Body mass index is 23.57 kg/m².     Physical Exam  Vitals and nursing note reviewed.   Constitutional:       General: She is awake. She is not in acute distress.     Appearance: Normal appearance. She is well-developed and well-groomed. She is not ill-appearing, toxic-appearing or diaphoretic.   HENT:      Head: Normocephalic and atraumatic.      Mouth/Throat:      Lips: Pink.      Mouth: Mucous membranes are moist.      Pharynx: Oropharynx is clear. Uvula midline.   Eyes:      Extraocular Movements: Extraocular movements intact.      Conjunctiva/sclera: Conjunctivae normal.      Pupils: Pupils are equal, round, and reactive to light.   Cardiovascular:      Rate and Rhythm: Normal rate and regular  rhythm.      Heart sounds: Normal heart sounds. No murmur heard.  Pulmonary:      Effort: Pulmonary effort is normal.      Breath sounds: Normal breath sounds. No decreased breath sounds, wheezing, rhonchi or rales.   Abdominal:      General: Bowel sounds are normal.      Palpations: Abdomen is soft.      Tenderness: There is no abdominal tenderness.   Musculoskeletal:      Cervical back: Normal range of motion and neck supple.      Right lower leg: No edema.      Left lower leg: No edema.      Comments: 5/5 strength throughout   Skin:     General: Skin is warm and dry.      Capillary Refill: Capillary refill takes less than 2 seconds.   Neurological:      General: No focal deficit present.      Mental Status: She is alert and oriented to person, place, and time. Mental status is at baseline.      GCS: GCS eye subscore is 4. GCS verbal subscore is 5. GCS motor subscore is 6.      Cranial Nerves: Cranial nerves 2-12 are intact.      Sensory: Sensation is intact.      Motor: Motor function is intact.   Psychiatric:         Mood and Affect: Mood normal.         Speech: Speech normal.         Behavior: Behavior normal. Behavior is cooperative.              CRANIAL NERVES     CN III, IV, VI   Pupils are equal, round, and reactive to light.     LABS:  Recent Results (from the past 24 hours)   EKG 12-lead    Collection Time: 01/24/25 12:48 PM   Result Value Ref Range    QRS Duration 90 ms    [facility] QTC Calculation 500 ms   Hepatitis C Antibody    Collection Time: 01/24/25 12:58 PM   Result Value Ref Range    Hepatitis C Ab Negative Negative   HIV 1/2 Ag/Ab (4th Gen)    Collection Time: 01/24/25 12:58 PM   Result Value Ref Range    HIV 1/2 Ag/Ab Negative Negative   CBC auto differential    Collection Time: 01/24/25 12:58 PM   Result Value Ref Range    WBC 10.83 3.90 - 12.70 K/uL    RBC 4.66 4.00 - 5.40 M/uL    Hemoglobin 13.9 12.0 - 16.0 g/dL    Hematocrit 40.6 37.0 - 48.5 %    MCV 87 82 - 98 fL    MCH 29.8 27.0 - 31.0 pg     MCHC 34.2 32.0 - 36.0 g/dL    RDW 13.6 11.5 - 14.5 %    Platelets 129 (L) 150 - 450 K/uL    MPV 10.2 9.2 - 12.9 fL    Immature Granulocytes 0.2 0.0 - 0.5 %    Gran # (ANC) 10.1 (H) 1.8 - 7.7 K/uL    Immature Grans (Abs) 0.02 0.00 - 0.04 K/uL    Lymph # 0.4 (L) 1.0 - 4.8 K/uL    Mono # 0.3 0.3 - 1.0 K/uL    Eos # 0.0 0.0 - 0.5 K/uL    Baso # 0.01 0.00 - 0.20 K/uL    nRBC 0 0 /100 WBC    Gran % 93.4 (H) 38.0 - 73.0 %    Lymph % 3.5 (L) 18.0 - 48.0 %    Mono % 2.8 (L) 4.0 - 15.0 %    Eosinophil % 0.0 0.0 - 8.0 %    Basophil % 0.1 0.0 - 1.9 %    Platelet Estimate Appears normal     Large/Giant Platelets Present     Differential Method Automated    Comprehensive metabolic panel    Collection Time: 01/24/25 12:58 PM   Result Value Ref Range    Sodium 137 136 - 145 mmol/L    Potassium 2.8 (L) 3.5 - 5.1 mmol/L    Chloride 102 95 - 110 mmol/L    CO2 20 (L) 23 - 29 mmol/L    Glucose 240 (H) 70 - 110 mg/dL    BUN 14 8 - 23 mg/dL    Creatinine 0.8 0.5 - 1.4 mg/dL    Calcium 8.9 8.7 - 10.5 mg/dL    Total Protein 6.5 6.0 - 8.4 g/dL    Albumin 3.5 3.5 - 5.2 g/dL    Total Bilirubin 1.4 (H) 0.1 - 1.0 mg/dL    Alkaline Phosphatase 71 40 - 150 U/L    AST 24 10 - 40 U/L    ALT 20 10 - 44 U/L    eGFR >60.0 >60 mL/min/1.73 m^2    Anion Gap 15 8 - 16 mmol/L   Lactic acid, plasma #1    Collection Time: 01/24/25 12:58 PM   Result Value Ref Range    Lactate (Lactic Acid) 3.7 (HH) 0.5 - 2.2 mmol/L   Procalcitonin    Collection Time: 01/24/25 12:58 PM   Result Value Ref Range    Procalcitonin 0.71 (H) <0.25 ng/mL   Troponin I    Collection Time: 01/24/25 12:58 PM   Result Value Ref Range    Troponin I 0.029 (H) 0.000 - 0.026 ng/mL   Lipase    Collection Time: 01/24/25 12:58 PM   Result Value Ref Range    Lipase 7 4 - 60 U/L   Brain natriuretic peptide    Collection Time: 01/24/25 12:58 PM   Result Value Ref Range     (H) 0 - 99 pg/mL   POCT Influenza A/B Molecular    Collection Time: 01/24/25  1:35 PM   Result Value Ref Range    POC  Molecular Influenza A Ag Negative Negative    POC Molecular Influenza B Ag Negative Negative     Acceptable Yes    POCT COVID-19 Rapid Screening    Collection Time: 01/24/25  1:36 PM   Result Value Ref Range    POC Rapid COVID Negative Negative     Acceptable Yes    Lactic acid, plasma #2    Collection Time: 01/24/25  6:31 PM   Result Value Ref Range    Lactate (Lactic Acid) 1.1 0.5 - 2.2 mmol/L   Urinalysis, Reflex to Urine Culture Urine, Clean Catch    Collection Time: 01/24/25  9:02 PM    Specimen: Urine   Result Value Ref Range    Specimen UA Urine, Clean Catch     Color, UA Yellow Yellow, Straw, Lorna    Appearance, UA Clear Clear    pH, UA 6.0 5.0 - 8.0    Specific Gravity, UA 1.010 1.005 - 1.030    Protein, UA Negative Negative    Glucose, UA Negative Negative    Ketones, UA Negative Negative    Bilirubin (UA) Negative Negative    Occult Blood UA Negative Negative    Nitrite, UA Negative Negative    Urobilinogen, UA Negative <2.0 EU/dL    Leukocytes, UA 1+ (A) Negative   Urinalysis Microscopic    Collection Time: 01/24/25  9:02 PM   Result Value Ref Range    RBC, UA 1 0 - 4 /hpf    WBC, UA 4 0 - 5 /hpf    Bacteria Rare None-Occ /hpf    Microscopic Comment SEE COMMENT        RADIOLOGY  X-Ray Chest AP Portable    Result Date: 1/24/2025  EXAMINATION: XR CHEST AP PORTABLE CLINICAL HISTORY: Sepsis; TECHNIQUE: Single frontal view of the chest was performed. COMPARISON: 04/05/2018 FINDINGS: Chest expansion is suboptimal.  Heart size is normal.  There is mild patchy opacity which may represent early developing lung consolidation or atelectasis in the left infrahilar region and medially at the right lung base.  No free pleural fluid is visible.  Pulmonary vasculature is normal.  Postsurgical changes are again seen in the cervical spine.     Suboptimal inspiration with mild bilateral patchy opacity described above.  Early developing multilobar pneumonia cannot be excluded.  Follow-up  standard PA and lateral views of the chest would be helpful for further evaluation when feasible. Electronically signed by: Taco Kaur Date:    01/24/2025 Time:    12:53      EKG    MICROBIOLOGY    MDM     Amount and/or Complexity of Data Reviewed  Clinical lab tests: reviewed  Tests in the radiology section of CPT®: reviewed  Tests in the medicine section of CPT®: reviewed  Discussion of test results with the performing providers: yes  Decide to obtain previous medical records or to obtain history from someone other than the patient: yes  Obtain history from someone other than the patient: yes  Review and summarize past medical records: yes  Discuss the patient with other providers: yes  Independent visualization of images, tracings, or specimens: yes

## 2025-01-25 NOTE — H&P
[facility] Medicine  History & Physical    Patient Name: Linda Segura  MRN: [id number]  Patient Class: IP- Inpatient  Admission Date: 1/24/2025  Attending Physician: Momo Coyne MD   Primary Care Provider: Rolly Hammond MD         Patient information was obtained from patient, past medical records, and ER records.     Subjective:     Principal Problem:Multifocal pneumonia    Chief Complaint:   Chief Complaint   Patient presents with    General Illness     Loose cough x 2 days. Generalized weakness. Vomited last night. 101 temp at home, motrin at 830 am.         HPI: Linda Segura is a 74 y.o. female with a PMH  has a past medical history of Abnormal Pap smear of vagina, Anxiety, Anxiety and depression, Cancer, CVA (cerebral vascular accident), Essential hypertension (6/12/2018), Mental disorder, Other specified hypothyroidism (2/13/2021), and Scoliosis.  Presented to outside facility for evaluation of productive cough and generalized weakness with fever of 101 with associated chills started 2 days ago.  Denies any recent illnesses or sick contacts.  Reports minimal relief with over-the-counter medications.  Denies any chest pain, palpitations, shortness for breath, dyspnea exertion, or any other symptoms.    ER workup revealed CBC to be unremarkable.  CMP revealed potassium 2.8 with CBG of 240 mg/dL.  .  Troponin 0.029.  Initial lactic acid 3.7 with repeat lactic acid of 1.1.  Procalcitonin level 0.71.  Flu/COVID negative.  UA unremarkable.  X-ray revealed development of multilobar pneumonia.  EKG revealed normal sinus rhythm with prolonged QT with a ventricular rate of 82 beats per minute and a QT/QTC of 428/500.  Initial O2 saturation of 88% on room air with currently 96% on 4 liters/minute via nasal cannula.  Patient received 500 mg azithromycin 1 g Rocephin IV at outside facility.  Patient also received 10 mEq potassium IV and 1, 848 cc normal saline  bolus.  Hospital Medicine consulted to admit patient for multifocal pneumonia.  Patient in agreement with treatment plan.  Patient admitted under inpatient status.    PCP: Rolly Hammond      Past Medical History:   Diagnosis Date    Abnormal Pap smear of vagina     Anxiety     Anxiety and depression     Cancer     tongue    CVA (cerebral vascular accident)     Essential hypertension 6/12/2018    Mental disorder     Other specified hypothyroidism 2/13/2021    Scoliosis        Past Surgical History:   Procedure Laterality Date    BLADDER SURGERY      CERVICAL SPINE SURGERY      colpocleisis  04/2018    Dr. Strong    feet Bilateral     HYSTERECTOMY      severe endometriosis    KNEE SURGERY      OOPHORECTOMY Bilateral     severe endometriosis       Review of patient's allergies indicates:  No Known Allergies    No current facility-administered medications on file prior to encounter.     Current Outpatient Medications on File Prior to Encounter   Medication Sig    alprazolam (XANAX) 1 MG tablet Take 1 tablet (1 mg total) by mouth 2 (two) times daily as needed for Anxiety.    aspirin (ECOTRIN) 81 MG EC tablet Take 81 mg by mouth once daily.    atorvastatin (LIPITOR) 10 MG tablet Take 1 tablet by mouth every evening.    cholecalciferol, vitamin D3, 10,000 unit Cap Take 1 capsule by mouth.    clopidogreL (PLAVIX) 75 mg tablet Take 1 tablet by mouth every morning.    gabapentin (NEURONTIN) 100 MG capsule Take 100 mg by mouth 2 (two) times daily.    levothyroxine (SYNTHROID) 50 MCG tablet Take 50 mcg by mouth.    metoprolol succinate (TOPROL-XL) 50 MG 24 hr tablet Take 1 tablet by mouth once daily.    pantoprazole (PROTONIX) 40 MG tablet Take 40 mg by mouth.    polyethylene glycol (GLYCOLAX) 17 gram PwPk Take by mouth.    sennosides (SENNA) 8.6 mg Cap Take 1 tablet by mouth daily as needed (Constipation).    sertraline (ZOLOFT) 100 MG tablet Take 100 mg by mouth 2 (two) times daily.    baclofen (LIORESAL) 10 MG tablet  Take 10 mg by mouth.    clobetasol 0.05% (TEMOVATE) 0.05 % Oint APPLY TOPICALLY 2 (TWO) TIMES DAILY.    zolpidem (AMBIEN) 10 mg Tab Take 10 mg by mouth nightly as needed.     Family History       Problem Relation (Age of Onset)    Breast cancer Maternal Aunt    Cancer Maternal Uncle    Colon cancer Maternal Uncle    Diabetes Mother    Heart disease Mother    Hypertension Sister, Sister, Daughter, Daughter    Stroke Daughter          Tobacco Use    Smoking status: Never    Smokeless tobacco: Never   Substance and Sexual Activity    Alcohol use: No     Alcohol/week: 0.0 standard drinks of alcohol    Drug use: No    Sexual activity: Not Currently     Review of Systems   Constitutional:  Positive for fatigue and fever. Negative for chills and diaphoresis.   Respiratory:  Positive for cough. Negative for shortness of breath.    Cardiovascular:  Negative for chest pain, palpitations and leg swelling.   Gastrointestinal:  Negative for diarrhea, nausea and vomiting.   All other systems reviewed and are negative.    Objective:     Vital Signs (Most Recent):  Temp: 98.1 °F (36.7 °C) (01/25/25 0456)  Pulse: 72 (01/25/25 0456)  Resp: 20 (01/25/25 0456)  BP: (!) 109/55 (01/25/25 0456)  SpO2: (!) 90 % (01/25/25 0456) Vital Signs (24h Range):  Temp:  [97.6 °F (36.4 °C)-98.5 °F (36.9 °C)] 98.1 °F (36.7 °C)  Pulse:  [71-89] 72  Resp:  [18-28] 20  SpO2:  [88 %-96 %] 90 %  BP: (104-146)/(51-76) 109/55     Weight: 70.3 kg (155 lb)  Body mass index is 23.57 kg/m².     Physical Exam  Vitals and nursing note reviewed.   Constitutional:       General: She is awake. She is not in acute distress.     Appearance: Normal appearance. She is well-developed and well-groomed. She is not ill-appearing, toxic-appearing or diaphoretic.   HENT:      Head: Normocephalic and atraumatic.      Mouth/Throat:      Lips: Pink.      Mouth: Mucous membranes are moist.      Pharynx: Oropharynx is clear. Uvula midline.   Eyes:      Extraocular Movements:  Extraocular movements intact.      Conjunctiva/sclera: Conjunctivae normal.      Pupils: Pupils are equal, round, and reactive to light.   Cardiovascular:      Rate and Rhythm: Normal rate and regular rhythm.      Heart sounds: Normal heart sounds. No murmur heard.  Pulmonary:      Effort: Pulmonary effort is normal.      Breath sounds: Normal breath sounds. No decreased breath sounds, wheezing, rhonchi or rales.   Abdominal:      General: Bowel sounds are normal.      Palpations: Abdomen is soft.      Tenderness: There is no abdominal tenderness.   Musculoskeletal:      Cervical back: Normal range of motion and neck supple.      Right lower leg: No edema.      Left lower leg: No edema.      Comments: 5/5 strength throughout   Skin:     General: Skin is warm and dry.      Capillary Refill: Capillary refill takes less than 2 seconds.   Neurological:      General: No focal deficit present.      Mental Status: She is alert and oriented to person, place, and time. Mental status is at baseline.      GCS: GCS eye subscore is 4. GCS verbal subscore is 5. GCS motor subscore is 6.      Cranial Nerves: Cranial nerves 2-12 are intact.      Sensory: Sensation is intact.      Motor: Motor function is intact.   Psychiatric:         Mood and Affect: Mood normal.         Speech: Speech normal.         Behavior: Behavior normal. Behavior is cooperative.              CRANIAL NERVES     CN III, IV, VI   Pupils are equal, round, and reactive to light.     LABS:  Recent Results (from the past 24 hours)   EKG 12-lead    Collection Time: 01/24/25 12:48 PM   Result Value Ref Range    QRS Duration 90 ms    OHS QTC Calculation 500 ms   Hepatitis C Antibody    Collection Time: 01/24/25 12:58 PM   Result Value Ref Range    Hepatitis C Ab Negative Negative   HIV 1/2 Ag/Ab (4th Gen)    Collection Time: 01/24/25 12:58 PM   Result Value Ref Range    HIV 1/2 Ag/Ab Negative Negative   CBC auto differential    Collection Time: 01/24/25 12:58 PM    Result Value Ref Range    WBC 10.83 3.90 - 12.70 K/uL    RBC 4.66 4.00 - 5.40 M/uL    Hemoglobin 13.9 12.0 - 16.0 g/dL    Hematocrit 40.6 37.0 - 48.5 %    MCV 87 82 - 98 fL    MCH 29.8 27.0 - 31.0 pg    MCHC 34.2 32.0 - 36.0 g/dL    RDW 13.6 11.5 - 14.5 %    Platelets 129 (L) 150 - 450 K/uL    MPV 10.2 9.2 - 12.9 fL    Immature Granulocytes 0.2 0.0 - 0.5 %    Gran # (ANC) 10.1 (H) 1.8 - 7.7 K/uL    Immature Grans (Abs) 0.02 0.00 - 0.04 K/uL    Lymph # 0.4 (L) 1.0 - 4.8 K/uL    Mono # 0.3 0.3 - 1.0 K/uL    Eos # 0.0 0.0 - 0.5 K/uL    Baso # 0.01 0.00 - 0.20 K/uL    nRBC 0 0 /100 WBC    Gran % 93.4 (H) 38.0 - 73.0 %    Lymph % 3.5 (L) 18.0 - 48.0 %    Mono % 2.8 (L) 4.0 - 15.0 %    Eosinophil % 0.0 0.0 - 8.0 %    Basophil % 0.1 0.0 - 1.9 %    Platelet Estimate Appears normal     Large/Giant Platelets Present     Differential Method Automated    Comprehensive metabolic panel    Collection Time: 01/24/25 12:58 PM   Result Value Ref Range    Sodium 137 136 - 145 mmol/L    Potassium 2.8 (L) 3.5 - 5.1 mmol/L    Chloride 102 95 - 110 mmol/L    CO2 20 (L) 23 - 29 mmol/L    Glucose 240 (H) 70 - 110 mg/dL    BUN 14 8 - 23 mg/dL    Creatinine 0.8 0.5 - 1.4 mg/dL    Calcium 8.9 8.7 - 10.5 mg/dL    Total Protein 6.5 6.0 - 8.4 g/dL    Albumin 3.5 3.5 - 5.2 g/dL    Total Bilirubin 1.4 (H) 0.1 - 1.0 mg/dL    Alkaline Phosphatase 71 40 - 150 U/L    AST 24 10 - 40 U/L    ALT 20 10 - 44 U/L    eGFR >60.0 >60 mL/min/1.73 m^2    Anion Gap 15 8 - 16 mmol/L   Lactic acid, plasma #1    Collection Time: 01/24/25 12:58 PM   Result Value Ref Range    Lactate (Lactic Acid) 3.7 (HH) 0.5 - 2.2 mmol/L   Procalcitonin    Collection Time: 01/24/25 12:58 PM   Result Value Ref Range    Procalcitonin 0.71 (H) <0.25 ng/mL   Troponin I    Collection Time: 01/24/25 12:58 PM   Result Value Ref Range    Troponin I 0.029 (H) 0.000 - 0.026 ng/mL   Lipase    Collection Time: 01/24/25 12:58 PM   Result Value Ref Range    Lipase 7 4 - 60 U/L   Brain  natriuretic peptide    Collection Time: 01/24/25 12:58 PM   Result Value Ref Range     (H) 0 - 99 pg/mL   POCT Influenza A/B Molecular    Collection Time: 01/24/25  1:35 PM   Result Value Ref Range    POC Molecular Influenza A Ag Negative Negative    POC Molecular Influenza B Ag Negative Negative     Acceptable Yes    POCT COVID-19 Rapid Screening    Collection Time: 01/24/25  1:36 PM   Result Value Ref Range    POC Rapid COVID Negative Negative     Acceptable Yes    Lactic acid, plasma #2    Collection Time: 01/24/25  6:31 PM   Result Value Ref Range    Lactate (Lactic Acid) 1.1 0.5 - 2.2 mmol/L   Urinalysis, Reflex to Urine Culture Urine, Clean Catch    Collection Time: 01/24/25  9:02 PM    Specimen: Urine   Result Value Ref Range    Specimen UA Urine, Clean Catch     Color, UA Yellow Yellow, Straw, Lorna    Appearance, UA Clear Clear    pH, UA 6.0 5.0 - 8.0    Specific Gravity, UA 1.010 1.005 - 1.030    Protein, UA Negative Negative    Glucose, UA Negative Negative    Ketones, UA Negative Negative    Bilirubin (UA) Negative Negative    Occult Blood UA Negative Negative    Nitrite, UA Negative Negative    Urobilinogen, UA Negative <2.0 EU/dL    Leukocytes, UA 1+ (A) Negative   Urinalysis Microscopic    Collection Time: 01/24/25  9:02 PM   Result Value Ref Range    RBC, UA 1 0 - 4 /hpf    WBC, UA 4 0 - 5 /hpf    Bacteria Rare None-Occ /hpf    Microscopic Comment SEE COMMENT        RADIOLOGY  X-Ray Chest AP Portable    Result Date: 1/24/2025  EXAMINATION: XR CHEST AP PORTABLE CLINICAL HISTORY: Sepsis; TECHNIQUE: Single frontal view of the chest was performed. COMPARISON: 04/05/2018 FINDINGS: Chest expansion is suboptimal.  Heart size is normal.  There is mild patchy opacity which may represent early developing lung consolidation or atelectasis in the left infrahilar region and medially at the right lung base.  No free pleural fluid is visible.  Pulmonary vasculature is normal.   Postsurgical changes are again seen in the cervical spine.     Suboptimal inspiration with mild bilateral patchy opacity described above.  Early developing multilobar pneumonia cannot be excluded.  Follow-up standard PA and lateral views of the chest would be helpful for further evaluation when feasible. Electronically signed by: Taco Kaur Date:    01/24/2025 Time:    12:53      EKG    MICROBIOLOGY    MDM     Amount and/or Complexity of Data Reviewed  Clinical lab tests: reviewed  Tests in the radiology section of CPT®: reviewed  Tests in the medicine section of CPT®: reviewed  Discussion of test results with the performing providers: yes  Decide to obtain previous medical records or to obtain history from someone other than the patient: yes  Obtain history from someone other than the patient: yes  Review and summarize past medical records: yes  Discuss the patient with other providers: yes  Independent visualization of images, tracings, or specimens: yes        Assessment/Plan:     * Multifocal pneumonia  Patient has a diagnosis of pneumonia. The cause of the pneumonia is unknown at this time. The pneumonia is stable. The patient has the following signs/symptoms of pneumonia: cough, sputum production, and shortness of breath. The patient does have a current oxygen requirement and the patient does have a home oxygen requirement. I have reviewed the pertinent imaging. The following cultures have been collected: Blood cultures The culture results are listed below.     Current antimicrobial regimen consists of the antibiotics listed below. Will monitor patient closely and continue current treatment plan unchanged.    Antibiotics (From admission, onward)      Start     Stop Route Frequency Ordered    01/25/25 1400  cefTRIAXone injection 1 g         -- IV Every 24 hours (non-standard times) 01/24/25 2351    01/25/25 1400  azithromycin (ZITHROMAX) 500 mg in 0.9% NaCl 250 mL IVPB (admixture device)         -- IV  "Every 24 hours (non-standard times) 01/24/25 2351     Microbiology Results (last 7 days)       Procedure Component Value Units Date/Time    Blood culture x two cultures. Draw prior to antibiotics. [[id number]] Collected: 01/24/25 1250    Order Status: Sent Specimen: Blood from Peripheral, Forearm, Left Updated: 01/25/25 0203    Blood culture x two cultures. Draw prior to antibiotics. [[id number]] Collected: 01/24/25 1256    Order Status: Sent Specimen: Blood from Peripheral, Antecubital, Right Updated: 01/25/25 0203       Essential hypertension  Chronic, controlled.  Latest blood pressure and vitals reviewed-   Temp:  [97.6 °F (36.4 °C)-98.5 °F (36.9 °C)]   Pulse:  [71-89]   Resp:  [18-28]   BP: (104-146)/(51-76)   SpO2:  [88 %-96 %] .   Home meds for hypertension were reviewed and noted below.   Hypertension Medications               metoprolol succinate (TOPROL-XL) 50 MG 24 hr tablet Take 1 tablet by mouth once daily.     While in the hospital, will manage blood pressure as follows; Continue home antihypertensive regimen    Will utilize p.r.n. blood pressure medication only if patient's blood pressure greater than  160/90 and she develops symptoms such as worsening chest pain or shortness of breath.      Hyperglycemia  Patient's FSGs are uncontrolled due to hyperglycemia on current medication regimen.  Last A1c reviewed-   Lab Results   Component Value Date    HGBA1C 5.4 08/17/2020     Most recent fingerstick glucose reviewed- No results for input(s): "POCTGLUCOSE" in the last 24 hours.  Current correctional scale  Low  Maintain anti-hyperglycemic dose as follows-   Antihyperglycemics (From admission, onward)     No History of diabetes.  Plan:  -SSI  -A1c  -Accu-checks  -Hypoglycemic protocol        Other specified hypothyroidism  Patient has chronic hypothyroidism. TFTs reviewed-   Lab Results   Component Value Date    TSH 6.52 (H) 03/20/2024   . Will continue chronic levothyroxine and adjust for and clinical " changes.      GERD (gastroesophageal reflux disease)  Chronic. Stable. Currently asymptomatic. Home medications include PPI/Antacids as needed.  Plan:  -Continue PPI/Antacids as needed       Anxiety and depression  Chronic. Stable. Not in acute exacerbation and currently denies endorsing any suicidal/homicidal ideations.   Plan:  -Continue home medications (xanax and zoloft)      History of CVA (cerebrovascular accident)  -continue ASA, plavix, and lipitor        VTE Risk Mitigation (From admission, onward)           Ordered     enoxaparin injection 40 mg  Daily         01/24/25 2354     IP VTE HIGH RISK PATIENT  Once         01/24/25 2354     Place sequential compression device  Until discontinued         01/24/25 2354                  //Core Measures   -DVT proph: SCDs, lovenox  -Code status Full  -Surrogate:family      Components of this note were documented using a voice recognition system and are subject to errors not corrected at the time the document was proof read. Please contact the author for any clarifications.       Silver Coyne NP  Department of Hospital Medicine  [facility] - Telemetry ([facility])

## 2025-01-25 NOTE — ASSESSMENT & PLAN NOTE
"Patient's FSGs are uncontrolled due to hyperglycemia on current medication regimen.  Last A1c reviewed-   Lab Results   Component Value Date    HGBA1C 5.4 08/17/2020     Most recent fingerstick glucose reviewed- No results for input(s): "POCTGLUCOSE" in the last 24 hours.  Current correctional scale  Low  Maintain anti-hyperglycemic dose as follows-   Antihyperglycemics (From admission, onward)      None        No History of diabetes.  Plan:  -SSI  -A1c  -Accu-checks  -Hypoglycemic protocol        "

## 2025-01-25 NOTE — ED NOTES
Attempted to call report to [facility]. RN stated she would call back in 5-10 min.

## 2025-01-25 NOTE — PLAN OF CARE
[facility] - Telemetry (Hospital)  Initial Discharge Assessment       Primary Care Provider: Rolly Hammond MD    Admission Diagnosis: Pneumonia [J18.9]  Screening for cardiovascular condition [Z13.6]    Admission Date: 1/24/2025  Expected Discharge Date:     Transition of Care Barriers: None    Payor: [employer] [facility] / Plan: [employer] / Product Type: Medicare Advantage /     Extended Emergency Contact Information  Primary Emergency Contact: Jenn Leyva  Address: [address] [place] [address] [facility]  Mobile Phone: [phone]  Relation: Daughter    Discharge Plan A: Home, Home with family         [facility] [place] [address]  [address]  [address]  [place] [address]  Phone: [phone] Fax: [phone]      Initial Assessment (most recent)       Adult Discharge Assessment - 01/25/25 1325          Discharge Assessment    Assessment Type Discharge Planning Assessment     Confirmed/corrected address, phone number and insurance Yes     Confirmed Demographics Correct on Facesheet     Source of Information patient     When was your last doctors appointment? --   October    Does patient/caregiver understand observation status Yes     Reason For Admission pneumonia     People in Home child(jaki), adult     Do you expect to return to your current living situation? Yes     Do you have help at home or someone to help you manage your care at home? Yes     Who are your caregiver(s) and their phone number(s)? [place]     Prior to hospitilization cognitive status: Alert/Oriented     Current cognitive status: Alert/Oriented     Walking or Climbing Stairs Difficulty no     Dressing/Bathing Difficulty no     Equipment Currently Used at Home walker, standard;wheelchair;rollator;shower chair     Readmission within 30 days? No     Patient currently being followed by outpatient case management? No     Do you currently have  service(s) that help you manage your care at home? No     Do you take prescription medications? Yes     Do you have prescription coverage? Yes     Coverage [employer]     Do you have any problems affording any of your prescribed medications? No     Is the patient taking medications as prescribed? yes     Who is going to help you get home at discharge? Son Дмитрий Colton     How do you get to doctors appointments? family or friend will provide     Are you on dialysis? No     Do you take coumadin? No     Discharge Plan A Home;Home with family     DME Needed Upon Discharge  none     Discharge Plan discussed with: Patient     Transition of Care Barriers None

## 2025-01-25 NOTE — HPI
Linda Segura is a 74 y.o. female with a PMH  has a past medical history of Abnormal Pap smear of vagina, Anxiety, Anxiety and depression, Cancer, CVA (cerebral vascular accident), Essential hypertension (6/12/2018), Mental disorder, Other specified hypothyroidism (2/13/2021), and Scoliosis.  Presented to outside facility for evaluation of productive cough and generalized weakness with fever of 101 with associated chills started 2 days ago.  Denies any recent illnesses or sick contacts.  Reports minimal relief with over-the-counter medications.  Denies any chest pain, palpitations, shortness for breath, dyspnea exertion, or any other symptoms.    ER workup revealed CBC to be unremarkable.  CMP revealed potassium 2.8 with CBG of 240 mg/dL.  .  Troponin 0.029.  Initial lactic acid 3.7 with repeat lactic acid of 1.1.  Procalcitonin level 0.71.  Flu/COVID negative.  UA unremarkable.  X-ray revealed development of multilobar pneumonia.  EKG revealed normal sinus rhythm with prolonged QT with a ventricular rate of 82 beats per minute and a QT/QTC of 428/500.  Initial O2 saturation of 88% on room air with currently 96% on 4 liters/minute via nasal cannula.  Patient received 500 mg azithromycin 1 g Rocephin IV at outside facility.  Patient also received 10 mEq potassium IV and 1, 848 cc normal saline bolus.  Hospital Medicine consulted to admit patient for multifocal pneumonia.  Patient in agreement with treatment plan.  Patient admitted under inpatient status.    PCP: Rolly Hammond

## 2025-01-25 NOTE — PLAN OF CARE
[id number]/[id number] HARDY Segura is a 74 y.o.female admitted on 1/24/2025 for Multifocal pneumonia   Code Status: Full Code MRN: [id number]   Review of patient's allergies indicates:  No Known Allergies  Past Medical History:   Diagnosis Date    Abnormal Pap smear of vagina     Anxiety     Anxiety and depression     Cancer     tongue    CVA (cerebral vascular accident)     Essential hypertension 6/12/2018    Mental disorder     Other specified hypothyroidism 2/13/2021    Scoliosis       PRN meds    acetaminophen, 650 mg, Q6H PRN  acetaminophen, 650 mg, Q6H PRN  ALPRAZolam, 1 mg, BID PRN  aluminum-magnesium hydroxide-simethicone, 30 mL, QID PRN  dextrose 50%, 12.5 g, PRN  dextrose 50%, 12.5 g, PRN  dextrose 50%, 25 g, PRN  dextrose 50%, 25 g, PRN  glucagon (human recombinant), 1 mg, PRN  glucagon (human recombinant), 1 mg, PRN  glucose, 16 g, PRN  glucose, 16 g, PRN  glucose, 24 g, PRN  glucose, 24 g, PRN  insulin aspart U-100, 0-5 Units, QID (AC + HS) PRN  melatonin, 6 mg, Nightly PRN  naloxone, 0.02 mg, PRN  ondansetron, 4 mg, Q8H PRN  polyethylene glycol, 17 g, Daily PRN  promethazine, 25 mg, Q6H PRN  simethicone, 1 tablet, QID PRN  sodium chloride 0.9%, 3 mL, Q12H PRN      Chart check completed. Will continue plan of care.         [place] Coma Scale Score: 15     Lead Monitored: Lead II Rhythm: normal sinus rhythm         Last Bowel Movement: 01/24/25  Diet Adult Regular     Keron Score: 18  Fall Risk Score: 17  Accucheck []   Freq?      Lines/Drains/Airways       Peripheral Intravenous Line  Duration                  Peripheral IV - Single Lumen 01/24/25 1256 20 G Right Antecubital <1 day

## 2025-01-25 NOTE — ASSESSMENT & PLAN NOTE
Chronic. Stable. Not in acute exacerbation and currently denies endorsing any suicidal/homicidal ideations.   Plan:  -Continue home medications (xanax and zoloft)

## 2025-01-25 NOTE — ASSESSMENT & PLAN NOTE
Patient has a diagnosis of pneumonia. The cause of the pneumonia is unknown at this time. The pneumonia is stable. The patient has the following signs/symptoms of pneumonia: cough, sputum production, and shortness of breath. The patient does have a current oxygen requirement and the patient does have a home oxygen requirement. I have reviewed the pertinent imaging. The following cultures have been collected: Blood cultures The culture results are listed below.     Current antimicrobial regimen consists of the antibiotics listed below. Will monitor patient closely and continue current treatment plan unchanged.    Antibiotics (From admission, onward)      Start     Stop Route Frequency Ordered    01/25/25 1400  cefTRIAXone injection 1 g         -- IV Every 24 hours (non-standard times) 01/24/25 2351    01/25/25 1400  azithromycin (ZITHROMAX) 500 mg in 0.9% NaCl 250 mL IVPB (admixture device)         -- IV Every 24 hours (non-standard times) 01/24/25 2351            Microbiology Results (last 7 days)       Procedure Component Value Units Date/Time    Blood culture x two cultures. Draw prior to antibiotics. [[id number]] Collected: 01/24/25 1250    Order Status: Sent Specimen: Blood from Peripheral, Forearm, Left Updated: 01/25/25 0203    Blood culture x two cultures. Draw prior to antibiotics. [[id number]] Collected: 01/24/25 1256    Order Status: Sent Specimen: Blood from Peripheral, Antecubital, Right Updated: 01/25/25 0203

## 2025-01-25 NOTE — ASSESSMENT & PLAN NOTE
Patient has chronic hypothyroidism. TFTs reviewed-   Lab Results   Component Value Date    TSH 6.52 (H) 03/20/2024   . Will continue chronic levothyroxine and adjust for and clinical changes.

## 2025-01-25 NOTE — CARE UPDATE
Evaluated patient at bedside  Visitor present    Reports receiving treatment outpatient in October with improvement then got worse again  Denies wearing supplemental oxygen at baseline  Social hx second hand smoke exposure but denies smoking or environmental exposure  Complains of cough    Mild distress  Mild respiratory distress on 4L nasal cannula  Decreased air movement  Rhonchi  Soft abdomen, non tender  Bruising   Mild lower extremity edema   AO3  Ill appearing  Weakness    Adjust treatment regimen  Add scheduled albuterol nebs  Add mucomyst and chest physiotherapy  Ct chest   Follow up echocardiogram results  Add intravenous lasix and po k  Decrease fluid rate  Respiratory infection panel  Discontinue azithromycin due to prolonged qtc  Speech consulted

## 2025-01-26 PROBLEM — B34.8 RHINOVIRUS INFECTION: Status: ACTIVE | Noted: 2025-01-26

## 2025-01-26 PROBLEM — D69.6 THROMBOCYTOPENIA: Status: ACTIVE | Noted: 2025-01-26

## 2025-01-26 PROBLEM — J96.01 ACUTE HYPOXEMIC RESPIRATORY FAILURE: Status: ACTIVE | Noted: 2025-01-26

## 2025-01-26 LAB
CRP SERPL-MCNC: 204.6 MG/L (ref 0–8.2)
ERYTHROCYTE [SEDIMENTATION RATE] IN BLOOD BY WESTERGREN METHOD: 39 MM/HR (ref 0–36)
OHS QRS DURATION: 90 MS
OHS QTC CALCULATION: 500 MS
POCT GLUCOSE: 160 MG/DL (ref 70–110)
POCT GLUCOSE: 163 MG/DL (ref 70–110)
POCT GLUCOSE: 184 MG/DL (ref 70–110)

## 2025-01-26 PROCEDURE — 63600175 PHARM REV CODE 636 W HCPCS: Performed by: NURSE PRACTITIONER

## 2025-01-26 PROCEDURE — 86738 MYCOPLASMA ANTIBODY: CPT | Performed by: INTERNAL MEDICINE

## 2025-01-26 PROCEDURE — 86038 ANTINUCLEAR ANTIBODIES: CPT | Performed by: INTERNAL MEDICINE

## 2025-01-26 PROCEDURE — 94761 N-INVAS EAR/PLS OXIMETRY MLT: CPT

## 2025-01-26 PROCEDURE — 21400001 HC TELEMETRY ROOM

## 2025-01-26 PROCEDURE — 36415 COLL VENOUS BLD VENIPUNCTURE: CPT | Performed by: INTERNAL MEDICINE

## 2025-01-26 PROCEDURE — 87449 NOS EACH ORGANISM AG IA: CPT | Performed by: INTERNAL MEDICINE

## 2025-01-26 PROCEDURE — 97535 SELF CARE MNGMENT TRAINING: CPT

## 2025-01-26 PROCEDURE — 27000221 HC OXYGEN, UP TO 24 HOURS

## 2025-01-26 PROCEDURE — 99900035 HC TECH TIME PER 15 MIN (STAT)

## 2025-01-26 PROCEDURE — 27000646 HC AEROBIKA DEVICE

## 2025-01-26 PROCEDURE — 25000003 PHARM REV CODE 250: Performed by: FAMILY MEDICINE

## 2025-01-26 PROCEDURE — 63600175 PHARM REV CODE 636 W HCPCS: Performed by: FAMILY MEDICINE

## 2025-01-26 PROCEDURE — 86235 NUCLEAR ANTIGEN ANTIBODY: CPT | Performed by: INTERNAL MEDICINE

## 2025-01-26 PROCEDURE — 85651 RBC SED RATE NONAUTOMATED: CPT | Performed by: INTERNAL MEDICINE

## 2025-01-26 PROCEDURE — 94664 DEMO&/EVAL PT USE INHALER: CPT

## 2025-01-26 PROCEDURE — 86713 LEGIONELLA ANTIBODY: CPT | Performed by: INTERNAL MEDICINE

## 2025-01-26 PROCEDURE — 27000207 HC ISOLATION

## 2025-01-26 PROCEDURE — 63600175 PHARM REV CODE 636 W HCPCS: Mod: JZ,TB | Performed by: INTERNAL MEDICINE

## 2025-01-26 PROCEDURE — 94640 AIRWAY INHALATION TREATMENT: CPT

## 2025-01-26 PROCEDURE — 86140 C-REACTIVE PROTEIN: CPT | Performed by: INTERNAL MEDICINE

## 2025-01-26 PROCEDURE — 25000003 PHARM REV CODE 250: Performed by: NURSE PRACTITIONER

## 2025-01-26 PROCEDURE — 87449 NOS EACH ORGANISM AG IA: CPT | Mod: 91 | Performed by: INTERNAL MEDICINE

## 2025-01-26 PROCEDURE — 86635 COCCIDIOIDES ANTIBODY: CPT | Performed by: INTERNAL MEDICINE

## 2025-01-26 PROCEDURE — 86039 ANTINUCLEAR ANTIBODIES (ANA): CPT | Performed by: INTERNAL MEDICINE

## 2025-01-26 PROCEDURE — 99223 1ST HOSP IP/OBS HIGH 75: CPT | Mod: ,,, | Performed by: INTERNAL MEDICINE

## 2025-01-26 PROCEDURE — 25000242 PHARM REV CODE 250 ALT 637 W/ HCPCS: Performed by: FAMILY MEDICINE

## 2025-01-26 PROCEDURE — 92610 EVALUATE SWALLOWING FUNCTION: CPT

## 2025-01-26 RX ORDER — HYDRALAZINE HYDROCHLORIDE 20 MG/ML
10 INJECTION INTRAMUSCULAR; INTRAVENOUS EVERY 6 HOURS PRN
Status: DISCONTINUED | OUTPATIENT
Start: 2025-01-26 | End: 2025-02-01 | Stop reason: HOSPADM

## 2025-01-26 RX ORDER — ELECTROLYTES/DEXTROSE
400 SOLUTION, ORAL ORAL
Status: DISCONTINUED | OUTPATIENT
Start: 2025-01-26 | End: 2025-01-26

## 2025-01-26 RX ORDER — GUAIFENESIN 600 MG/1
600 TABLET, EXTENDED RELEASE ORAL 2 TIMES DAILY
Status: DISCONTINUED | OUTPATIENT
Start: 2025-01-26 | End: 2025-02-01 | Stop reason: HOSPADM

## 2025-01-26 RX ORDER — LEVOTHYROXINE SODIUM 50 UG/1
50 TABLET ORAL
Status: DISCONTINUED | OUTPATIENT
Start: 2025-01-28 | End: 2025-02-01 | Stop reason: HOSPADM

## 2025-01-26 RX ADMIN — CLOPIDOGREL BISULFATE 75 MG: 75 TABLET ORAL at 06:01

## 2025-01-26 RX ADMIN — METHYLPREDNISOLONE SODIUM SUCCINATE 40 MG: 40 INJECTION, POWDER, FOR SOLUTION INTRAMUSCULAR; INTRAVENOUS at 10:01

## 2025-01-26 RX ADMIN — ALBUTEROL SULFATE 2.5 MG: 0.83 SOLUTION RESPIRATORY (INHALATION) at 07:01

## 2025-01-26 RX ADMIN — ASPIRIN 81 MG: 81 TABLET, COATED ORAL at 08:01

## 2025-01-26 RX ADMIN — Medication 400 ML: at 09:01

## 2025-01-26 RX ADMIN — SERTRALINE HYDROCHLORIDE 100 MG: 50 TABLET ORAL at 08:01

## 2025-01-26 RX ADMIN — Medication 400 ML: at 01:01

## 2025-01-26 RX ADMIN — METOPROLOL SUCCINATE 50 MG: 50 TABLET, EXTENDED RELEASE ORAL at 08:01

## 2025-01-26 RX ADMIN — ALPRAZOLAM 0.5 MG: 0.5 TABLET ORAL at 09:01

## 2025-01-26 RX ADMIN — GUAIFENESIN 600 MG: 600 TABLET, EXTENDED RELEASE ORAL at 08:01

## 2025-01-26 RX ADMIN — ACETYLCYSTEINE 4 ML: 100 INHALANT RESPIRATORY (INHALATION) at 07:01

## 2025-01-26 RX ADMIN — CEFTRIAXONE 1 G: 1 INJECTION, POWDER, FOR SOLUTION INTRAMUSCULAR; INTRAVENOUS at 01:01

## 2025-01-26 RX ADMIN — Medication 400 ML: at 04:01

## 2025-01-26 RX ADMIN — DOXYCYCLINE HYCLATE 100 MG: 100 TABLET, COATED ORAL at 08:01

## 2025-01-26 RX ADMIN — Medication 400 ML: at 08:01

## 2025-01-26 RX ADMIN — SERTRALINE HYDROCHLORIDE 100 MG: 50 TABLET ORAL at 09:01

## 2025-01-26 RX ADMIN — ALBUTEROL SULFATE 2.5 MG: 0.83 SOLUTION RESPIRATORY (INHALATION) at 02:01

## 2025-01-26 RX ADMIN — METHYLPREDNISOLONE SODIUM SUCCINATE 40 MG: 40 INJECTION, POWDER, FOR SOLUTION INTRAMUSCULAR; INTRAVENOUS at 09:01

## 2025-01-26 RX ADMIN — ATORVASTATIN CALCIUM 10 MG: 10 TABLET, FILM COATED ORAL at 09:01

## 2025-01-26 RX ADMIN — DOXYCYCLINE HYCLATE 100 MG: 100 TABLET, COATED ORAL at 09:01

## 2025-01-26 RX ADMIN — FUROSEMIDE 20 MG: 10 INJECTION, SOLUTION INTRAMUSCULAR; INTRAVENOUS at 10:01

## 2025-01-26 RX ADMIN — ACETYLCYSTEINE 4 ML: 100 INHALANT RESPIRATORY (INHALATION) at 11:01

## 2025-01-26 RX ADMIN — ACETYLCYSTEINE 4 ML: 100 INHALANT RESPIRATORY (INHALATION) at 04:01

## 2025-01-26 RX ADMIN — PANTOPRAZOLE SODIUM 40 MG: 40 TABLET, DELAYED RELEASE ORAL at 08:01

## 2025-01-26 RX ADMIN — POTASSIUM CHLORIDE 40 MEQ: 1500 TABLET, EXTENDED RELEASE ORAL at 08:01

## 2025-01-26 RX ADMIN — GUAIFENESIN 600 MG: 600 TABLET, EXTENDED RELEASE ORAL at 09:01

## 2025-01-26 NOTE — CONSULTS
[facility] - Telemetry ([facility])  Pulmonology  Consult Note    Patient Name: Linda Segura  MRN: [id number]  Admission Date: 1/24/2025  Hospital Length of Stay: 2 days  Code Status: Full Code  Attending Physician: Farhat Mason MD  Primary Care Provider: Rolly Hammond MD   Principal Problem: Multifocal pneumonia    Consults  Subjective:     HPI:  Linda Segura is 74 y.o.   Asked to see for abn CXR and chest CT  Daughter bedside  Seen in urgent care   2-3 days cough weakness fever 101 chills  Nonproductive sputum   Denies occupation exposures   No prior history of illness   No prior outpatient treatment   Never smoker   Retired  She is currently on 4 L of oxygen   Past medical history anxiety depression CVA hypertension hypothyroidism.    Notable labs , lactate 3.7, flu COVID negative.  X-ray reviewed   Chest CT reviewed   Currently on ceftriaxone and doxycycline.    Culture data unrevealing so far          Past Medical History:   Diagnosis Date    Abnormal Pap smear of vagina     Anxiety     Anxiety and depression     Cancer     tongue    CVA (cerebral vascular accident)     Essential hypertension 6/12/2018    Mental disorder     Other specified hypothyroidism 2/13/2021    Scoliosis        Past Surgical History:   Procedure Laterality Date    BLADDER SURGERY      CERVICAL SPINE SURGERY      colpocleisis  04/2018    Dr. Strong    feet Bilateral     HYSTERECTOMY      severe endometriosis    KNEE SURGERY      OOPHORECTOMY Bilateral     severe endometriosis       Review of patient's allergies indicates:  No Known Allergies    Family History       Problem Relation (Age of Onset)    Breast cancer Maternal Aunt    Cancer Maternal Uncle    Colon cancer Maternal Uncle    Diabetes Mother    Heart disease Mother    Hypertension Sister, Sister, Daughter, Daughter    Stroke Daughter          Tobacco Use    Smoking status: Never    Smokeless tobacco: Never   Substance and Sexual Activity    Alcohol use: No      Alcohol/week: 0.0 standard drinks of alcohol    Drug use: No    Sexual activity: Not Currently         Review of Systems   Constitutional:  Positive for fatigue and fever.   Respiratory:  Positive for cough.    Neurological:  Positive for weakness.     Objective:     Vital Signs (Most Recent):  Temp: 97.3 °F (36.3 °C) (01/26/25 0824)  Pulse: 89 (01/26/25 0824)  Resp: 18 (01/26/25 0824)  BP: (!) 152/67 (01/26/25 0824)  SpO2: (!) 92 % (01/26/25 0824) Vital Signs (24h Range):  Temp:  [97.3 °F (36.3 °C)-99.9 °F (37.7 °C)] 97.3 °F (36.3 °C)  Pulse:  [] 89  Resp:  [16-20] 18  SpO2:  [91 %-95 %] 92 %  BP: (124-157)/(58-69) 152/67     Weight: 70.3 kg (154 lb 15.7 oz)  Body mass index is 23.57 kg/m².      Intake/Output Summary (Last 24 hours) at 1/26/2025 0844  Last data filed at 1/26/2025 0835  Gross per 24 hour   Intake 1040 ml   Output 2575 ml   Net -1535 ml        Physical Exam  Vitals and nursing note reviewed.   Constitutional:       Appearance: She is well-developed and normal weight.   HENT:      Head: Normocephalic and atraumatic.      Nose: Nose normal.      Mouth/Throat:      Pharynx: No oropharyngeal exudate.   Eyes:      General: No scleral icterus.     Conjunctiva/sclera: Conjunctivae normal.      Pupils: Pupils are equal, round, and reactive to light.   Neck:      Thyroid: No thyromegaly.      Vascular: No JVD.      Trachea: No tracheal deviation.   Cardiovascular:      Rate and Rhythm: Normal rate and regular rhythm.      Heart sounds: Normal heart sounds, S1 normal and S2 normal. No murmur heard.  Pulmonary:      Effort: Pulmonary effort is normal. No tachypnea, accessory muscle usage or respiratory distress.      Breath sounds: No stridor. Rhonchi present. No wheezing or rales.   Abdominal:      General: Bowel sounds are normal. There is no distension.      Palpations: Abdomen is soft. There is no hepatomegaly, splenomegaly or mass.      Tenderness: There is no abdominal tenderness. There is no  "guarding or rebound.   Musculoskeletal:         General: No tenderness. Normal range of motion.      Cervical back: Normal range of motion and neck supple.   Lymphadenopathy:      Upper Body:      Right upper body: No supraclavicular adenopathy.      Left upper body: No supraclavicular adenopathy.   Skin:     General: Skin is warm and dry.      Findings: No rash.      Nails: There is no clubbing.   Neurological:      Mental Status: She is alert and oriented to person, place, and time.      Coordination: Coordination normal.      Gait: Gait normal.      Deep Tendon Reflexes: Reflexes are normal and symmetric.          Vents:  Oxygen Concentration (%): 36 (01/25/25 1604)    Lines/Drains/Airways       Peripheral Intravenous Line  Duration                  Peripheral IV - Single Lumen 01/25/25 2012 20 G Posterior;Right Hand <1 day                    Significant Labs:    CBC/Anemia Profile:  Recent Labs   Lab 01/24/25  1258 01/25/25  0426   WBC 10.83 7.72   HGB 13.9 11.9*   HCT 40.6 36.3*   * 107*   MCV 87 89   RDW 13.6 14.0        Chemistries:  Recent Labs   Lab 01/24/25  1258 01/25/25  0426    146*   K 2.8* 3.3*    112*   CO2 20* 22*   BUN 14 11   CREATININE 0.8 0.6   CALCIUM 8.9 9.0   ALBUMIN 3.5 2.7*   PROT 6.5 5.2*   BILITOT 1.4* 1.0   ALKPHOS 71 63   ALT 20 14   AST 24 23   MG  --  1.8       Blood Culture:   Recent Labs   Lab 01/24/25  1250 01/24/25  1256   LABBLOO No Growth to date  No Growth to date No Growth to date  No Growth to date     Respiratory Culture: No results for input(s): "GSRESP", "RESPIRATORYC" in the last 48 hours.  All pertinent labs within the past 24 hours have been reviewed.    Significant Imaging:   I have reviewed all pertinent imaging results/findings within the past 24 hours.    CT Chest With Contrast  Narrative: EXAMINATION:  CT CHEST WITH CONTRAST    CLINICAL HISTORY:  Pneumonia, unresolved;    TECHNIQUE:  Axial images through the chest were obtained after the IV " administration of 75 mL Omnipaque 350. Coronal and sagittal images obtained.    COMPARISON:  No prior chest CT available    FINDINGS:  Patchy areas of airspace disease predominantly in the peripheral lung and subpleural region may relate to pneumonic process.  No evidence for pulmonary embolism.  Mild cardiomegaly.  No evidence for aortic dissection.  Moderate motion artifacts.  Trace left-sided pleural effusion.  Fatty infiltration liver.  Inhomogeneous enhancement of the spleen may relate to timing of contrast.  Tracheobronchial tree is patent.  Coronary artery calcification.  Cervical hardware.  Impression: As above    All CT scans   are performed using dose optimization techniques including the following: automated exposure control; adjustment of the mA and/or kV; use of iterative reconstruction technique.  Dose modulation was employed for ALARA by means of: Automated exposure control; adjustment of the mA and/or kV according to patient size (this includes techniques or standardized protocols for targeted exams where dose is matched to indication/reason for exam; i.e. extremities or head); and/or use of iterative reconstructive technique.    Electronically signed by: Luis Enrique Crowe  Date:    01/25/2025  Time:    20:05  Echo    Left Ventricle: The left ventricle is normal in size. Ventricular mass   is normal. Mildly increased wall thickness. There is concentric   remodeling. There is normal systolic function with a visually estimated   ejection fraction of 60 - 65%. There is normal diastolic function.    Right Ventricle: Normal right ventricular cavity size. Wall thickness   is normal. Systolic function is normal.    Tricuspid Valve: There is mild regurgitation.    Pulmonary Artery: The estimated pulmonary artery systolic pressure is   39 mmHg.    IVC/SVC: Normal venous pressure at 3 mmHg.     Component      Latest Ref Rng 1/26/2025   Sed Rate      0 - 36 mm/Hr 39 (H)    CRP      0.0 - 8.2 mg/L 204.6 (H)      "  Legend:  (H) High  ABG  No results for input(s): "PH", "PO2", "PCO2", "HCO3", "BE" in the last 168 hours.  Assessment/Plan:     Pulmonary  * Multifocal pneumonia  Patient has a diagnosis of pneumonia. The cause of the pneumonia is suspected to be bacterial in etiology but organism is not known. The pneumonia is stable. The patient has the following signs/symptoms of pneumonia: persistent hypoxia  and cough. The patient does have a current oxygen requirement and the patient does have a home oxygen requirement. I have reviewed the pertinent imaging. The following cultures have been collected: Blood cultures and Sputum culture The culture results are listed below.     Current antimicrobial regimen consists of the antibiotics listed below. Will monitor patient closely and continue current treatment plan unchanged.    Antibiotics (From admission, onward)      Start     Stop Route Frequency Ordered    01/25/25 2100  doxycycline tablet 100 mg         -- Oral Every 12 hours 01/25/25 1809    01/25/25 1400  cefTRIAXone injection 1 g         -- IV Every 24 hours (non-standard times) 01/24/25 2351            Microbiology Results (last 7 days)       Procedure Component Value Units Date/Time    Blood culture x two cultures. Draw prior to antibiotics. [[id number]] Collected: 01/24/25 1250    Order Status: Completed Specimen: Blood from Peripheral, Forearm, Left Updated: 01/26/25 0612     Blood Culture, Routine No Growth to date      No Growth to date    Narrative:      Aerobic and anaerobic    Blood culture x two cultures. Draw prior to antibiotics. [[id number]] Collected: 01/24/25 1256    Order Status: Completed Specimen: Blood from Peripheral, Antecubital, Right Updated: 01/26/25 0612     Blood Culture, Routine No Growth to date      No Growth to date    Narrative:      Aerobic and anaerobic    Respiratory Infection Panel (PCR), Nasopharyngeal [[id number]] Collected: 01/25/25 1406    Order Status: Sent Specimen: Nasopharyngeal " Swab Updated: 01/25/25 1437        4 doses Solu-Medrol    Acute hypoxemic respiratory failure  Patient with Hypoxic Respiratory failure which is Acute.  she is not on home oxygen. Supplemental oxygen was provided and noted- Oxygen Concentration (%):  [36] 36    .   Signs/symptoms of respiratory failure include- increased work of breathing and respiratory distress. Contributing diagnoses includes - Pneumonia Labs and images were reviewed. Patient Has not had a recent ABG. Will treat underlying causes and adjust management of respiratory failure as follows-     Currently on 4 liters/minute oxygen    Follow up in office post discharge for x-ray in 2-4 weeks        Thank you for your consult. I will follow-up with patient. Please contact us if you have any additional questions.     Rodolfo Hong MD  Pulmonology  [facility] - Telemetry ([facility])

## 2025-01-26 NOTE — PT/OT/SLP EVAL
"Speech Language Pathology Evaluation  Bedside Swallow    Patient Name:  Linda Segura   MRN:  [id number]  Admitting Diagnosis: Rhinovirus infection    Recommendations:                 General Recommendations:  Outpatient GI evaluation and Follow-up not indicated  Diet recommendations:  Soft & Bite Sized Diet - IDDSI Level 6, Thin liquids - IDDSI Level 0   Aspiration Precautions: 1 bite/sip at a time, Alternating bites/sips, Frequent oral care, HOB to 90 degrees, Meds whole buried in puree, Meds whole 1 at a time, Monitor for s/s of aspiration, and Standard aspiration precautions   General Precautions: Standard, aspiration  Communication strategies:  none    Assessment:     Linda Segura is a 74 y.o. female with a PMHx significant for cancer of tongue (2007), CVA, GERD, HTN, and mental disorder who presented to the ED s/t cough and generalized weakness. She reports intermittent globus sensation at home > w solids. She endorses eating soft and bite sized solids at baseline as she is edentulous. At bedside she is appreciated w functional OM and cognitive-linguistic ability to meet acute wants/needs. CSE was significant for no overt s/s of aspiration appreciated throughout. Pt reports she "does not like to sit up to eat," and prefers to recline in her recliner. ST educated pt and family on standard aspiration precautions and behavioral reflux precautions. Pt is recommended for baseline diet of soft and bite sized solids (IDDSI 6) with thin liquids (IDDSI 0) and medications taken one at a time whole in thin liquid (IDDSI 0) or in puree(IDDSI 4) if needed. ST discussed w pt should globus sensation continue post-acute or pt report symptoms worsen she should follow-up for outpatient MBSS and with Outpatient GI. No further acute ST needs at this time. MD to re-consult if medically indicated.     History:     Past Medical History:   Diagnosis Date    Abnormal Pap smear of vagina     Anxiety     Anxiety and depression     " Cancer     tongue    CVA (cerebral vascular accident)     Essential hypertension 6/12/2018    Mental disorder     Other specified hypothyroidism 2/13/2021    Scoliosis        Past Surgical History:   Procedure Laterality Date    BLADDER SURGERY      CERVICAL SPINE SURGERY      colpocleisis  04/2018    Dr. Strong    feet Bilateral     HYSTERECTOMY      severe endometriosis    KNEE SURGERY      OOPHORECTOMY Bilateral     severe endometriosis       Social History: Patient lives with her daughter in [place], [place].    Prior Intubation HX:  NA    Modified Barium Swallow: NA    CT CHEST WITH CONTRAST on 01/25/2025:    FINDINGS:  Patchy areas of airspace disease predominantly in the peripheral lung and subpleural region may relate to pneumonic process.  No evidence for pulmonary embolism.  Mild cardiomegaly.  No evidence for aortic dissection.  Moderate motion artifacts.  Trace left-sided pleural effusion.  Fatty infiltration liver.  Inhomogeneous enhancement of the spleen may relate to timing of contrast.  Tracheobronchial tree is patent.  Coronary artery calcification.  Cervical hardware.     Impression:     As above     Electronically signed by:Luis Enrique Crowe  Date:                                            01/25/2025  Time:                                           20:05    Prior diet: soft and bite sized solids. Easy to chew.    Occupation/hobbies/homemaking: Pt's children assist her with transportation, medication management, and ADLs.    Subjective     Pt seen bedside for ST  Patient goals: to go home     Pain/Comfort:  Pain Rating 1: 0/10  Pain Rating Post-Intervention 1: 0/10  Pain Rating 2: 0/10  Pain Rating Post-Intervention 2: 0/10    Respiratory Status:  see chart    Objective:     Oral Musculature Evaluation  Oral Musculature: WFL  Dentition: edentulous  Secretion Management: adequate  Mucosal Quality: good  Mandibular Strength and Mobility: WFL  Oral Labial Strength and Mobility: WFL  Lingual Strength and  Mobility: WFL  Velar Elevation: WFL  Buccal Strength and Mobility: WFL  Volitional Cough: appreciated  Volitional Swallow: present  Voice Prior to PO Intake: clear    Bedside Swallow Eval:     Clinical Swallow Examination:   Of note, patient self-fed throughout evaluation. Patient presented with:     CONSISTENCY  NOTES   THIN (IDDSI 0) Cup/straw sips   No overt s/s of aspiration appreciated    PUREE (IDDSI 4/Extremely Thick)   TSP/TBSP bites of applesauce No overt s/s of aspiration appreciated    SOLID (IDDSI 7/Regular) in PUREE (IDDSI 4/Extremely Thick) Bite of Maira Doone cookie in applesauce   No overt s/s of aspiration appreciated. Prolonged mastication w trace oral residue remaining. Pt w delayed throat clearing and report of globus sensation, reduced w cued LW.        Thickened liquids were not used in this assessment. Cele (2018) reported that thickened liquids have no sound evidence at reducing the risk of pneumonia in patients with dysphagia and can cause harm by increasing their risk of dehydration. It also presents an increased risk of UTI, electrolyte imbalance, constipation, fecal impaction, cognitive impairment, functional decline and even death (Langmore, 2002; Bina, 2016).  Thickened liquids are associated with risks including dehydration, increased pharyngeal residue, potential interference with medication absorption, and decreased quality of life (Sahara, 2013). Thickened liquids are also more likely to be silently aspirated than thin liquids (Jarrod et al., 2018). This supports the assertion that we should confirm a patient requires thickened liquids with an instrumental swallow study prior to recommending them.    References:   Sahara DASH (2013). Thickening agents used for dysphagia management: Effect on bioavailability of water, medication and feelings of satiety. Nutrition Journal, 12, 54. https://doi.org/10.1186/3023-1135-23-54    HARDY Garay, XUAN Atkins, HAIR Watkins, & HARDY Griffin  (2018). Cough response to aspiration in thin and thick fluids during FEES in hospitalized inpatients. International journal of language & communication disorders, 53(5), 909-918. https://doi.org/10.1111/9911-3036.56206    INTERPRETATION AND RISK ASSESSMENT:  Clinical swallow evaluation (CSE) revealed oral phase characterized by lingual, labial, and buccal strength and range of motion adequate for lip closure, bolus preparation and propulsion. The patient had no anterior loss of the bolus with complete closure of the lips around the utensils. Trace residue remained in the oral cavity following the swallow, cleared w cued liquid wash. Patient without overt clinical signs/symptoms of aspiration on any PO trials given. Patient presents with a possibly inefficient swallow as indicated by edentulous oral cavity. Patient reported globus sensation on trials of solid (IDDSI 7). Contributing risk factors for dysphagia include deficits in respiratory-swallow coordination. Patient with increased risk for silent aspiration given  hx of tongue cancer w radiation (2007) .    Goals:   Multidisciplinary Problems       SLP Goals       Not on file                    Plan:     Patient to be seen:      Plan of Care expires:     Plan of Care reviewed with:  patient, family   SLP Follow-Up:  No       Discharge recommendations:  No Therapy Indicated   Barriers to Discharge:  None    Time Tracking:     SLP Treatment Date:   01/26/25  Speech Start Time:  1045  Speech Stop Time:  1110     Speech Total Time (min):  25 min    Billable Minutes: Total Time 25    Matilda Parsons MA, PL-SLP, CCC-SLP  Speech Language Pathologist  01/26/2025

## 2025-01-26 NOTE — ASSESSMENT & PLAN NOTE
Patient's most recent potassium results are listed below.   Recent Labs     01/24/25  1258 01/25/25  0426   K 2.8* 3.3*     Plan  - Replete potassium per protocol  - Monitor potassium Daily  - Patient's hypokalemia is improving  - mg 1.8

## 2025-01-26 NOTE — PLAN OF CARE
A236/A236 HARDY Segura is a 74 y.o.female admitted on 1/24/2025 for Multifocal pneumonia   Code Status: Full Code MRN: [id number]   Review of patient's allergies indicates:  No Known Allergies  Past Medical History:   Diagnosis Date    Abnormal Pap smear of vagina     Anxiety     Anxiety and depression     Cancer     tongue    CVA (cerebral vascular accident)     Essential hypertension 6/12/2018    Mental disorder     Other specified hypothyroidism 2/13/2021    Scoliosis       PRN meds    acetaminophen, 650 mg, Q6H PRN  acetaminophen, 650 mg, Q6H PRN  ALPRAZolam, 0.5 mg, BID PRN  aluminum-magnesium hydroxide-simethicone, 30 mL, QID PRN  dextrose 50%, 12.5 g, PRN  dextrose 50%, 12.5 g, PRN  dextrose 50%, 25 g, PRN  dextrose 50%, 25 g, PRN  glucagon (human recombinant), 1 mg, PRN  glucagon (human recombinant), 1 mg, PRN  glucose, 16 g, PRN  glucose, 16 g, PRN  glucose, 24 g, PRN  glucose, 24 g, PRN  insulin aspart U-100, 0-5 Units, QID (AC + HS) PRN  melatonin, 6 mg, Nightly PRN  naloxone, 0.02 mg, PRN  ondansetron, 4 mg, Q8H PRN  polyethylene glycol, 17 g, Daily PRN  promethazine, 25 mg, Q6H PRN  simethicone, 1 tablet, QID PRN  sodium chloride 0.9%, 3 mL, Q12H PRN      Chart check completed. Will continue plan of care.         Debbie Coma Scale Score: 15     Lead Monitored: Lead II Rhythm: normal sinus rhythm    Cardiac/Telemetry Box Number: [id number]  VTE Core Measure: Pharmacological prophylaxis initiated/maintained Last Bowel Movement: 01/23/25  Diet Adult Regular Soft & Bite Sized (IDDSI Level 6)     Keron Score: 17  Fall Risk Score: 14  Accucheck [x]   Freq? ACHS     Lines/Drains/Airways       Peripheral Intravenous Line  Duration                  Peripheral IV - Single Lumen 01/25/25 2012 20 G Posterior;Right Hand <1 day

## 2025-01-26 NOTE — ASSESSMENT & PLAN NOTE
Chronic, controlled.  Latest blood pressure and vitals reviewed-   Temp:  [97.3 °F (36.3 °C)-99.9 °F (37.7 °C)]   Pulse:  []   Resp:  [16-20]   BP: (136-157)/(62-69)   SpO2:  [91 %-96 %] .   Home meds for hypertension were reviewed and noted below.   Hypertension Medications               metoprolol succinate (TOPROL-XL) 50 MG 24 hr tablet Take 1 tablet by mouth once daily.            While in the hospital, will manage blood pressure as follows; Continue home antihypertensive regimen    Will utilize p.r.n. blood pressure medication only if patient's blood pressure greater than  180/110 and she develops symptoms such as worsening chest pain or shortness of breath.

## 2025-01-26 NOTE — ASSESSMENT & PLAN NOTE
The likely etiology of thrombocytopenia is medication induced, the suspected medication(s) is/are plavix . The patients 3 most recent labs are listed below.  Recent Labs     01/24/25  1258 01/25/25  0426   * 107*     Plan  - Will transfuse if platelet count is <50k (if undergoing surgical procedure or have active bleeding).  - monitor daily

## 2025-01-26 NOTE — ASSESSMENT & PLAN NOTE
Patient with Hypoxic Respiratory failure which is Acute.  she is not on home oxygen. Supplemental oxygen was provided and noted- Oxygen Concentration (%):  [36] 36    .   Signs/symptoms of respiratory failure include- increased work of breathing and respiratory distress. Contributing diagnoses includes - Pneumonia Labs and images were reviewed. Patient Has not had a recent ABG. Will treat underlying causes and adjust management of respiratory failure as follows-     Currently on 4 liters/minute oxygen

## 2025-01-26 NOTE — HPI
Linda Segura is 74 y.o.   Asked to see for abn CXR and chest CT  Daughter bedside  Seen in urgent care   2-3 days cough weakness fever 101 chills  Nonproductive sputum   Denies occupation exposures   No prior history of illness   No prior outpatient treatment   Never smoker   Retired  She is currently on 4 L of oxygen   Past medical history anxiety depression CVA hypertension hypothyroidism.    Notable labs , lactate 3.7, flu COVID negative.  X-ray reviewed   Chest CT reviewed   Currently on ceftriaxone and doxycycline.    Culture data unrevealing so far

## 2025-01-26 NOTE — SUBJECTIVE & OBJECTIVE
Past Medical History:   Diagnosis Date    Abnormal Pap smear of vagina     Anxiety     Anxiety and depression     Cancer     tongue    CVA (cerebral vascular accident)     Essential hypertension 6/12/2018    Mental disorder     Other specified hypothyroidism 2/13/2021    Scoliosis        Past Surgical History:   Procedure Laterality Date    BLADDER SURGERY      CERVICAL SPINE SURGERY      colpocleisis  04/2018    Dr. Strong    feet Bilateral     HYSTERECTOMY      severe endometriosis    KNEE SURGERY      OOPHORECTOMY Bilateral     severe endometriosis       Review of patient's allergies indicates:  No Known Allergies    Family History       Problem Relation (Age of Onset)    Breast cancer Maternal Aunt    Cancer Maternal Uncle    Colon cancer Maternal Uncle    Diabetes Mother    Heart disease Mother    Hypertension Sister, Sister, Daughter, Daughter    Stroke Daughter          Tobacco Use    Smoking status: Never    Smokeless tobacco: Never   Substance and Sexual Activity    Alcohol use: No     Alcohol/week: 0.0 standard drinks of alcohol    Drug use: No    Sexual activity: Not Currently         Review of Systems   Constitutional:  Positive for fatigue and fever.   Respiratory:  Positive for cough.    Neurological:  Positive for weakness.     Objective:     Vital Signs (Most Recent):  Temp: 97.3 °F (36.3 °C) (01/26/25 0824)  Pulse: 89 (01/26/25 0824)  Resp: 18 (01/26/25 0824)  BP: (!) 152/67 (01/26/25 0824)  SpO2: (!) 92 % (01/26/25 0824) Vital Signs (24h Range):  Temp:  [97.3 °F (36.3 °C)-99.9 °F (37.7 °C)] 97.3 °F (36.3 °C)  Pulse:  [] 89  Resp:  [16-20] 18  SpO2:  [91 %-95 %] 92 %  BP: (124-157)/(58-69) 152/67     Weight: 70.3 kg (154 lb 15.7 oz)  Body mass index is 23.57 kg/m².      Intake/Output Summary (Last 24 hours) at 1/26/2025 0824  Last data filed at 1/26/2025 0835  Gross per 24 hour   Intake 1040 ml   Output 2575 ml   Net -1535 ml        Physical Exam  Vitals and nursing note reviewed.    Constitutional:       Appearance: She is well-developed and normal weight.   HENT:      Head: Normocephalic and atraumatic.      Nose: Nose normal.      Mouth/Throat:      Pharynx: No oropharyngeal exudate.   Eyes:      General: No scleral icterus.     Conjunctiva/sclera: Conjunctivae normal.      Pupils: Pupils are equal, round, and reactive to light.   Neck:      Thyroid: No thyromegaly.      Vascular: No JVD.      Trachea: No tracheal deviation.   Cardiovascular:      Rate and Rhythm: Normal rate and regular rhythm.      Heart sounds: Normal heart sounds, S1 normal and S2 normal. No murmur heard.  Pulmonary:      Effort: Pulmonary effort is normal. No tachypnea, accessory muscle usage or respiratory distress.      Breath sounds: No stridor. Rhonchi present. No wheezing or rales.   Abdominal:      General: Bowel sounds are normal. There is no distension.      Palpations: Abdomen is soft. There is no hepatomegaly, splenomegaly or mass.      Tenderness: There is no abdominal tenderness. There is no guarding or rebound.   Musculoskeletal:         General: No tenderness. Normal range of motion.      Cervical back: Normal range of motion and neck supple.   Lymphadenopathy:      Upper Body:      Right upper body: No supraclavicular adenopathy.      Left upper body: No supraclavicular adenopathy.   Skin:     General: Skin is warm and dry.      Findings: No rash.      Nails: There is no clubbing.   Neurological:      Mental Status: She is alert and oriented to person, place, and time.      Coordination: Coordination normal.      Gait: Gait normal.      Deep Tendon Reflexes: Reflexes are normal and symmetric.          Vents:  Oxygen Concentration (%): 36 (01/25/25 1604)    Lines/Drains/Airways       Peripheral Intravenous Line  Duration                  Peripheral IV - Single Lumen 01/25/25 2012 20 G Posterior;Right Hand <1 day                    Significant Labs:    CBC/Anemia Profile:  Recent Labs   Lab 01/24/25  1258  "01/25/25  0426   WBC 10.83 7.72   HGB 13.9 11.9*   HCT 40.6 36.3*   * 107*   MCV 87 89   RDW 13.6 14.0        Chemistries:  Recent Labs   Lab 01/24/25  1258 01/25/25  0426    146*   K 2.8* 3.3*    112*   CO2 20* 22*   BUN 14 11   CREATININE 0.8 0.6   CALCIUM 8.9 9.0   ALBUMIN 3.5 2.7*   PROT 6.5 5.2*   BILITOT 1.4* 1.0   ALKPHOS 71 63   ALT 20 14   AST 24 23   MG  --  1.8       Blood Culture:   Recent Labs   Lab 01/24/25  1250 01/24/25  1256   LABBLOO No Growth to date  No Growth to date No Growth to date  No Growth to date     Respiratory Culture: No results for input(s): "GSRESP", "RESPIRATORYC" in the last 48 hours.  All pertinent labs within the past 24 hours have been reviewed.    Significant Imaging:   I have reviewed all pertinent imaging results/findings within the past 24 hours.    CT Chest With Contrast  Narrative: EXAMINATION:  CT CHEST WITH CONTRAST    CLINICAL HISTORY:  Pneumonia, unresolved;    TECHNIQUE:  Axial images through the chest were obtained after the IV administration of 75 mL Omnipaque 350. Coronal and sagittal images obtained.    COMPARISON:  No prior chest CT available    FINDINGS:  Patchy areas of airspace disease predominantly in the peripheral lung and subpleural region may relate to pneumonic process.  No evidence for pulmonary embolism.  Mild cardiomegaly.  No evidence for aortic dissection.  Moderate motion artifacts.  Trace left-sided pleural effusion.  Fatty infiltration liver.  Inhomogeneous enhancement of the spleen may relate to timing of contrast.  Tracheobronchial tree is patent.  Coronary artery calcification.  Cervical hardware.  Impression: As above    All CT scans   are performed using dose optimization techniques including the following: automated exposure control; adjustment of the mA and/or kV; use of iterative reconstruction technique.  Dose modulation was employed for ALAROCKY by means of: Automated exposure control; adjustment of the mA and/or kV " according to patient size (this includes techniques or standardized protocols for targeted exams where dose is matched to indication/reason for exam; i.e. extremities or head); and/or use of iterative reconstructive technique.    Electronically signed by: Luis Enrique Crowe  Date:    01/25/2025  Time:    20:05  Echo    Left Ventricle: The left ventricle is normal in size. Ventricular mass   is normal. Mildly increased wall thickness. There is concentric   remodeling. There is normal systolic function with a visually estimated   ejection fraction of 60 - 65%. There is normal diastolic function.    Right Ventricle: Normal right ventricular cavity size. Wall thickness   is normal. Systolic function is normal.    Tricuspid Valve: There is mild regurgitation.    Pulmonary Artery: The estimated pulmonary artery systolic pressure is   39 mmHg.    IVC/SVC: Normal venous pressure at 3 mmHg.

## 2025-01-26 NOTE — PLAN OF CARE
A236/A236 HARDY Segura is a 74 y.o.female admitted on 1/24/2025 for Rhinovirus infection   Code Status: Full Code MRN: [id number]   Review of patient's allergies indicates:  No Known Allergies  Past Medical History:   Diagnosis Date    Abnormal Pap smear of vagina     Anxiety     Anxiety and depression     Cancer     tongue    CVA (cerebral vascular accident)     Essential hypertension 6/12/2018    Mental disorder     Other specified hypothyroidism 2/13/2021    Scoliosis       PRN meds    acetaminophen, 650 mg, Q6H PRN  acetaminophen, 650 mg, Q6H PRN  ALPRAZolam, 0.5 mg, BID PRN  aluminum-magnesium hydroxide-simethicone, 30 mL, QID PRN  dextrose 50%, 12.5 g, PRN  dextrose 50%, 25 g, PRN  glucagon (human recombinant), 1 mg, PRN  glucose, 16 g, PRN  glucose, 24 g, PRN  hydrALAZINE, 10 mg, Q6H PRN  melatonin, 6 mg, Nightly PRN  naloxone, 0.02 mg, PRN  ondansetron, 4 mg, Q8H PRN  polyethylene glycol, 17 g, Daily PRN  promethazine, 25 mg, Q6H PRN  simethicone, 1 tablet, QID PRN  sodium chloride 0.9%, 3 mL, Q12H PRN      Chart check completed. Will continue plan of care.   Encourage ambulation.   Rhinovirus detected on Respiratory panel did not result in EPIC faxed copy of the results in the chart.            Debbie Coma Scale Score: 15     Lead Monitored: Lead II Rhythm: normal sinus rhythm    Cardiac/Telemetry Box Number: [id number]  VTE Core Measure: Pharmacological prophylaxis initiated/maintained Last Bowel Movement: 01/23/25  Diet Adult Regular Soft & Bite Sized (IDDSI Level 6)     Keron Score: 17  Fall Risk Score: 14  Accucheck []   Freq?      Lines/Drains/Airways       Peripheral Intravenous Line  Duration                  Peripheral IV - Single Lumen 01/25/25 2012 20 G Posterior;Right Hand <1 day

## 2025-01-26 NOTE — HOSPITAL COURSE
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   Initial lactic acid 3.7 with repeat lactic acid of 1.1. Procalcitonin level 0.71 . Echo showed normal LVEF. Respiratory infection panel positive for Rhinovirus. Blood cultures show NGTD. Pulmonology consulted and agreed on POC.    On 1/28/25, respiratory status worsened requiring 80% Vapotherm. IV Lasix added. Mycoplasm, Legionella negative, fungitell negative. Sputum cultures grew moderate candida albicans. +Mycoplasm IgG, IgM negative.   +SASKIA, elevated CRP and ESR. Complements normal. dsDNA ab normal. Pulmonology recommended Rheumatology review case. Per Dr. Pfeiffer-low suspicion for connective tissue disease based on these labs and reported symptoms. No additional medication recommendations at this point. He recommended to follow up on the SASKIA profile and reach back out him if anything in the profile is positive and at that point I can set her up for outpatient follow up (or external referral for faster Rheumatology access).   Speech recommended MBSS which showed moderate oropharyngeal dysphagia, likely acute on chronic related to hx of tongue cancer (2007) s/p radiation, CVA, ACDF of C4-7, and generalized weakness. Speech recommended Nectar/Mildly thick liquids (IDDSI 2) and Minced and moist consistencies (IDDSI 5), Medications should be taken crushed (when possible) in puree.   Vapotherm weaned to 60%- now on nasal cannula. Pt improving. Physical/occupational therapy recommended home health. The patient will be discharged on Cefdinir, Doxycycline, Prednisone, lasix 20mg daily, and neb txs. Family at bedside for discharge instructions. Counseled to follow speech  recommendations and f/u with PCP and Pulmonology. Home health and home oxygen arranged. The patient was seen and examined today and determined to be stable for discharge.

## 2025-01-26 NOTE — ASSESSMENT & PLAN NOTE
Patient with Hypoxic Respiratory failure which is Acute.  she is not on home oxygen. Supplemental oxygen was provided and noted- Oxygen Concentration (%):  [36] 36    .   Signs/symptoms of respiratory failure include- tachypnea, increased work of breathing, and respiratory distress. Contributing diagnoses includes - Pneumonia Labs and images were reviewed. Patient Has not had a recent ABG. Will treat underlying causes and adjust management of respiratory failure as follows- continue current regimen

## 2025-01-26 NOTE — ASSESSMENT & PLAN NOTE
Patient's FSGs are controlled on current medication regimen.  Last A1c reviewed-   Lab Results   Component Value Date    HGBA1C 5.0 01/25/2025     Most recent fingerstick glucose reviewed-   Recent Labs   Lab 01/25/25  1652 01/25/25  2134 01/26/25  0614 01/26/25  1052   POCTGLUCOSE 220* 132* 160* 184*     Current correctional scale  Low  Maintain anti-hyperglycemic dose as follows-   Antihyperglycemics (From admission, onward)      Start     Stop Route Frequency Ordered    01/25/25 0603  insulin aspart U-100 pen 0-5 Units         -- SubQ Before meals & nightly PRN 01/25/25 0503        No History of diabetes.  Plan:  -SSI  -A1c  -Accu-checks  -Hypoglycemic protocol      On systemic steroids with poor po intake

## 2025-01-26 NOTE — ASSESSMENT & PLAN NOTE
Patient has a diagnosis of pneumonia. The cause of the pneumonia is viral in etiology due to  rhinovirus . The pneumonia is improving. The patient has the following signs/symptoms of pneumonia: cough, sputum production, and shortness of breath. The patient does have a current oxygen requirement and the patient does not have a home oxygen requirement. I have reviewed the pertinent imaging. The following cultures have been collected: Blood cultures The culture results are listed below.     Current antimicrobial regimen consists of the antibiotics listed below. Will monitor patient closely and continue current treatment plan unchanged.    Antibiotics (From admission, onward)      Start     Stop Route Frequency Ordered    01/25/25 2100  doxycycline tablet 100 mg         -- Oral Every 12 hours 01/25/25 1809    01/25/25 1400  cefTRIAXone injection 1 g         -- IV Every 24 hours (non-standard times) 01/24/25 2351            Microbiology Results (last 7 days)       Procedure Component Value Units Date/Time    Blood culture x two cultures. Draw prior to antibiotics. [[id number]] Collected: 01/24/25 1250    Order Status: Completed Specimen: Blood from Peripheral, Forearm, Left Updated: 01/26/25 0612     Blood Culture, Routine No Growth to date      No Growth to date    Narrative:      Aerobic and anaerobic    Blood culture x two cultures. Draw prior to antibiotics. [[id number]] Collected: 01/24/25 1256    Order Status: Completed Specimen: Blood from Peripheral, Antecubital, Right Updated: 01/26/25 0612     Blood Culture, Routine No Growth to date      No Growth to date    Narrative:      Aerobic and anaerobic    Respiratory Infection Panel (PCR), Nasopharyngeal [[id number]] Collected: 01/25/25 1406    Order Status: Sent Specimen: Nasopharyngeal Swab Updated: 01/25/25 1437        Continue current treatment as still requiring 4L nasal cannula

## 2025-01-26 NOTE — SUBJECTIVE & OBJECTIVE
Interval History: See hospital course for today      Review of Systems   Constitutional:  Positive for activity change, appetite change and fatigue.   Respiratory:  Positive for cough and shortness of breath.    Cardiovascular:  Negative for chest pain.   Gastrointestinal:  Negative for nausea and vomiting.   Neurological:  Positive for weakness.   Psychiatric/Behavioral:  Positive for dysphoric mood. Negative for agitation, behavioral problems, confusion and decreased concentration. The patient is not nervous/anxious.      Objective:     Vital Signs (Most Recent):  Temp: 98.6 °F (37 °C) (01/26/25 1243)  Pulse: 90 (01/26/25 1400)  Resp: 18 (01/26/25 1400)  BP: 138/64 (01/26/25 1243)  SpO2: (!) 91 % (01/26/25 1400) Vital Signs (24h Range):  Temp:  [97.3 °F (36.3 °C)-99.9 °F (37.7 °C)] 98.6 °F (37 °C)  Pulse:  [] 90  Resp:  [16-20] 18  SpO2:  [91 %-96 %] 91 %  BP: (136-157)/(62-69) 138/64     Weight: 70.3 kg (154 lb 15.7 oz)  Body mass index is 23.57 kg/m².    Intake/Output Summary (Last 24 hours) at 1/26/2025 1410  Last data filed at 1/26/2025 1319  Gross per 24 hour   Intake 1120 ml   Output 3900 ml   Net -2780 ml         Physical Exam  Vitals and nursing note reviewed. Exam conducted with a chaperone present (visitor, resp).   Constitutional:       General: She is not in acute distress.     Appearance: She is ill-appearing. She is not toxic-appearing.      Interventions: Nasal cannula in place.   HENT:      Head: Normocephalic and atraumatic.      Comments: Edentulous   Cardiovascular:      Rate and Rhythm: Normal rate.   Pulmonary:      Effort: Pulmonary effort is normal. Tachypnea present. No respiratory distress.      Breath sounds: Decreased air movement present. Rhonchi present.   Abdominal:      Palpations: Abdomen is soft.      Tenderness: There is no abdominal tenderness.   Skin:     General: Skin is warm.      Coloration: Skin is pale.   Neurological:      Mental Status: She is alert and oriented to  person, place, and time.      Motor: Weakness present.   Psychiatric:         Mood and Affect: Mood is depressed.             Significant Labs: All pertinent labs within the past 24 hours have been reviewed.  A1C:   Recent Labs   Lab 01/25/25 0426   HGBA1C 5.0     CBC:   Recent Labs   Lab 01/25/25 0426   WBC 7.72   HGB 11.9*   HCT 36.3*   *     CMP:   Recent Labs   Lab 01/25/25 0426   *   K 3.3*   *   CO2 22*   *   BUN 11   CREATININE 0.6   CALCIUM 9.0   PROT 5.2*   ALBUMIN 2.7*   BILITOT 1.0   ALKPHOS 63   AST 23   ALT 14   ANIONGAP 12     POCT Glucose:   Recent Labs   Lab 01/25/25  2134 01/26/25  0614 01/26/25  1052   POCTGLUCOSE 132* 160* 184*     Sed rate 39  Crp 205    Respiratory infection panel positive for rhinovirus     Significant Imaging: I have reviewed all pertinent imaging results/findings within the past 24 hours.  CT: I have reviewed all pertinent results/findings within the past 24 hours and my personal findings are:  mnemonic process, negative for pulmonary embolism, pleural effusion  CXR: I have reviewed all pertinent results/findings within the past 24 hours and my personal findings are:  bilateral patchy opacity  Echo: I have reviewed all pertinent results/findings within the past 24 hours and my personal findings are:  preserved ejection fraction, no pericardial effusion

## 2025-01-26 NOTE — ASSESSMENT & PLAN NOTE
Patient has a diagnosis of pneumonia. The cause of the pneumonia is suspected to be bacterial in etiology but organism is not known. The pneumonia is stable. The patient has the following signs/symptoms of pneumonia: persistent hypoxia  and cough. The patient does have a current oxygen requirement and the patient does have a home oxygen requirement. I have reviewed the pertinent imaging. The following cultures have been collected: Blood cultures and Sputum culture The culture results are listed below.     Current antimicrobial regimen consists of the antibiotics listed below. Will monitor patient closely and continue current treatment plan unchanged.    Antibiotics (From admission, onward)      Start     Stop Route Frequency Ordered    01/25/25 2100  doxycycline tablet 100 mg         -- Oral Every 12 hours 01/25/25 1809    01/25/25 1400  cefTRIAXone injection 1 g         -- IV Every 24 hours (non-standard times) 01/24/25 2351            Microbiology Results (last 7 days)       Procedure Component Value Units Date/Time    Blood culture x two cultures. Draw prior to antibiotics. [[id number]] Collected: 01/24/25 1250    Order Status: Completed Specimen: Blood from Peripheral, Forearm, Left Updated: 01/26/25 0612     Blood Culture, Routine No Growth to date      No Growth to date    Narrative:      Aerobic and anaerobic    Blood culture x two cultures. Draw prior to antibiotics. [[id number]] Collected: 01/24/25 1256    Order Status: Completed Specimen: Blood from Peripheral, Antecubital, Right Updated: 01/26/25 0612     Blood Culture, Routine No Growth to date      No Growth to date    Narrative:      Aerobic and anaerobic    Respiratory Infection Panel (PCR), Nasopharyngeal [[id number]] Collected: 01/25/25 1406    Order Status: Sent Specimen: Nasopharyngeal Swab Updated: 01/25/25 1437        4 doses Solu-Medrol

## 2025-01-26 NOTE — PROGRESS NOTES
[facility] Medicine  Progress Note    Patient Name: Linda Segura  MRN: [id number]  Patient Class: IP- Inpatient   Admission Date: 1/24/2025  Length of Stay: 2 days  Attending Physician: Farhat Mason MD  Primary Care Provider: Rolly Hammond MD        Subjective     Principal Problem:Rhinovirus infection        HPI:  Linda Segura is a 74 y.o. female with a PMH  has a past medical history of Abnormal Pap smear of vagina, Anxiety, Anxiety and depression, Cancer, CVA (cerebral vascular accident), Essential hypertension (6/12/2018), Mental disorder, Other specified hypothyroidism (2/13/2021), and Scoliosis.  Presented to outside facility for evaluation of productive cough and generalized weakness with fever of 101 with associated chills started 2 days ago.  Denies any recent illnesses or sick contacts.  Reports minimal relief with over-the-counter medications.  Denies any chest pain, palpitations, shortness for breath, dyspnea exertion, or any other symptoms.    ER workup revealed CBC to be unremarkable.  CMP revealed potassium 2.8 with CBG of 240 mg/dL.  .  Troponin 0.029.  Initial lactic acid 3.7 with repeat lactic acid of 1.1.  Procalcitonin level 0.71.  Flu/COVID negative.  UA unremarkable.  X-ray revealed development of multilobar pneumonia.  EKG revealed normal sinus rhythm with prolonged QT with a ventricular rate of 82 beats per minute and a QT/QTC of 428/500.  Initial O2 saturation of 88% on room air with currently 96% on 4 liters/minute via nasal cannula.  Patient received 500 mg azithromycin 1 g Rocephin IV at outside facility.  Patient also received 10 mEq potassium IV and 1, 848 cc normal saline bolus.  Hospital Medicine consulted to admit patient for multifocal pneumonia.  Patient in agreement with treatment plan.  Patient admitted under inpatient status.    PCP: Rolly Hammond      Overview/Hospital Course:  1/26 admitted for pneumonia. Respiratory  infection panel positive for rhinovirus. Endorses improvement in symptoms of cough and dyspnea but continues with weakness and poor po intake. Pulmonology consulted for pneumonia and pleural effusion. Continue current regimen.    Interval History: See hospital course for today      Review of Systems   Constitutional:  Positive for activity change, appetite change and fatigue.   Respiratory:  Positive for cough and shortness of breath.    Cardiovascular:  Negative for chest pain.   Gastrointestinal:  Negative for nausea and vomiting.   Neurological:  Positive for weakness.   Psychiatric/Behavioral:  Positive for dysphoric mood. Negative for agitation, behavioral problems, confusion and decreased concentration. The patient is not nervous/anxious.      Objective:     Vital Signs (Most Recent):  Temp: 98.6 °F (37 °C) (01/26/25 1243)  Pulse: 90 (01/26/25 1400)  Resp: 18 (01/26/25 1400)  BP: 138/64 (01/26/25 1243)  SpO2: (!) 91 % (01/26/25 1400) Vital Signs (24h Range):  Temp:  [97.3 °F (36.3 °C)-99.9 °F (37.7 °C)] 98.6 °F (37 °C)  Pulse:  [] 90  Resp:  [16-20] 18  SpO2:  [91 %-96 %] 91 %  BP: (136-157)/(62-69) 138/64     Weight: 70.3 kg (154 lb 15.7 oz)  Body mass index is 23.57 kg/m².    Intake/Output Summary (Last 24 hours) at 1/26/2025 1410  Last data filed at 1/26/2025 1319  Gross per 24 hour   Intake 1120 ml   Output 3900 ml   Net -2780 ml         Physical Exam  Vitals and nursing note reviewed. Exam conducted with a chaperone present (visitor, resp).   Constitutional:       General: She is not in acute distress.     Appearance: She is ill-appearing. She is not toxic-appearing.      Interventions: Nasal cannula in place.   HENT:      Head: Normocephalic and atraumatic.      Comments: Edentulous   Cardiovascular:      Rate and Rhythm: Normal rate.   Pulmonary:      Effort: Pulmonary effort is normal. Tachypnea present. No respiratory distress.      Breath sounds: Decreased air movement present. Rhonchi present.    Abdominal:      Palpations: Abdomen is soft.      Tenderness: There is no abdominal tenderness.   Skin:     General: Skin is warm.      Coloration: Skin is pale.   Neurological:      Mental Status: She is alert and oriented to person, place, and time.      Motor: Weakness present.   Psychiatric:         Mood and Affect: Mood is depressed.             Significant Labs: All pertinent labs within the past 24 hours have been reviewed.  A1C:   Recent Labs   Lab 01/25/25  0426   HGBA1C 5.0     CBC:   Recent Labs   Lab 01/25/25  0426   WBC 7.72   HGB 11.9*   HCT 36.3*   *     CMP:   Recent Labs   Lab 01/25/25  0426   *   K 3.3*   *   CO2 22*   *   BUN 11   CREATININE 0.6   CALCIUM 9.0   PROT 5.2*   ALBUMIN 2.7*   BILITOT 1.0   ALKPHOS 63   AST 23   ALT 14   ANIONGAP 12     POCT Glucose:   Recent Labs   Lab 01/25/25  2134 01/26/25  0614 01/26/25  1052   POCTGLUCOSE 132* 160* 184*     Sed rate 39  Crp 205    Respiratory infection panel positive for rhinovirus     Significant Imaging: I have reviewed all pertinent imaging results/findings within the past 24 hours.  CT: I have reviewed all pertinent results/findings within the past 24 hours and my personal findings are:  mnemonic process, negative for pulmonary embolism, pleural effusion  CXR: I have reviewed all pertinent results/findings within the past 24 hours and my personal findings are:  bilateral patchy opacity  Echo: I have reviewed all pertinent results/findings within the past 24 hours and my personal findings are:  preserved ejection fraction, no pericardial effusion    Assessment and Plan     * Rhinovirus infection        Thrombocytopenia  The likely etiology of thrombocytopenia is medication induced, the suspected medication(s) is/are plavix . The patients 3 most recent labs are listed below.  Recent Labs     01/24/25  1258 01/25/25  0426   * 107*     Plan  - Will transfuse if platelet count is <50k (if undergoing surgical  procedure or have active bleeding).  - monitor daily      Acute hypoxemic respiratory failure  Patient with Hypoxic Respiratory failure which is Acute.  she is not on home oxygen. Supplemental oxygen was provided and noted- Oxygen Concentration (%):  [36] 36    .   Signs/symptoms of respiratory failure include- tachypnea, increased work of breathing, and respiratory distress. Contributing diagnoses includes - Pneumonia Labs and images were reviewed. Patient Has not had a recent ABG. Will treat underlying causes and adjust management of respiratory failure as follows- continue current regimen    Hypokalemia  Patient's most recent potassium results are listed below.   Recent Labs     01/24/25  1258 01/25/25  0426   K 2.8* 3.3*     Plan  - Replete potassium per protocol  - Monitor potassium Daily  - Patient's hypokalemia is improving  - mg 1.8    Prolonged Q-T interval on ECG  Discontinue azithromycin  Add doxycycline     History of CVA (cerebrovascular accident)  -continue ASA, plavix, and lipitor  Thrombocytopenia noted   Continue plavix  Discontinue lovenox    Hyperglycemia  Patient's FSGs are controlled on current medication regimen.  Last A1c reviewed-   Lab Results   Component Value Date    HGBA1C 5.0 01/25/2025     Most recent fingerstick glucose reviewed-   Recent Labs   Lab 01/25/25  1652 01/25/25  2134 01/26/25  0614 01/26/25  1052   POCTGLUCOSE 220* 132* 160* 184*     Current correctional scale  Low  Maintain anti-hyperglycemic dose as follows-   Antihyperglycemics (From admission, onward)      Start     Stop Route Frequency Ordered    01/25/25 0603  insulin aspart U-100 pen 0-5 Units         -- SubQ Before meals & nightly PRN 01/25/25 0503        No History of diabetes.  Plan:  -SSI  -A1c  -Accu-checks  -Hypoglycemic protocol      On systemic steroids with poor po intake      Multifocal pneumonia  Patient has a diagnosis of pneumonia. The cause of the pneumonia is viral in etiology due to  rhinovirus . The  pneumonia is improving. The patient has the following signs/symptoms of pneumonia: cough, sputum production, and shortness of breath. The patient does have a current oxygen requirement and the patient does not have a home oxygen requirement. I have reviewed the pertinent imaging. The following cultures have been collected: Blood cultures The culture results are listed below.     Current antimicrobial regimen consists of the antibiotics listed below. Will monitor patient closely and continue current treatment plan unchanged.    Antibiotics (From admission, onward)      Start     Stop Route Frequency Ordered    01/25/25 2100  doxycycline tablet 100 mg         -- Oral Every 12 hours 01/25/25 1809    01/25/25 1400  cefTRIAXone injection 1 g         -- IV Every 24 hours (non-standard times) 01/24/25 2351            Microbiology Results (last 7 days)       Procedure Component Value Units Date/Time    Blood culture x two cultures. Draw prior to antibiotics. [[id number]] Collected: 01/24/25 1250    Order Status: Completed Specimen: Blood from Peripheral, Forearm, Left Updated: 01/26/25 0612     Blood Culture, Routine No Growth to date      No Growth to date    Narrative:      Aerobic and anaerobic    Blood culture x two cultures. Draw prior to antibiotics. [[id number]] Collected: 01/24/25 1256    Order Status: Completed Specimen: Blood from Peripheral, Antecubital, Right Updated: 01/26/25 0612     Blood Culture, Routine No Growth to date      No Growth to date    Narrative:      Aerobic and anaerobic    Respiratory Infection Panel (PCR), Nasopharyngeal [[id number]] Collected: 01/25/25 1406    Order Status: Sent Specimen: Nasopharyngeal Swab Updated: 01/25/25 1437        Continue current treatment as still requiring 4L nasal cannula     Other specified hypothyroidism  Patient has chronic hypothyroidism. TFTs reviewed-   Lab Results   Component Value Date    TSH 6.52 (H) 03/20/2024   . Will continue chronic levothyroxine and  adjust for and clinical changes.        Essential hypertension  Chronic, controlled.  Latest blood pressure and vitals reviewed-   Temp:  [97.3 °F (36.3 °C)-99.9 °F (37.7 °C)]   Pulse:  []   Resp:  [16-20]   BP: (136-157)/(62-69)   SpO2:  [91 %-96 %] .   Home meds for hypertension were reviewed and noted below.   Hypertension Medications               metoprolol succinate (TOPROL-XL) 50 MG 24 hr tablet Take 1 tablet by mouth once daily.            While in the hospital, will manage blood pressure as follows; Continue home antihypertensive regimen    Will utilize p.r.n. blood pressure medication only if patient's blood pressure greater than  180/110 and she develops symptoms such as worsening chest pain or shortness of breath.      GERD (gastroesophageal reflux disease)  Chronic. Stable. Currently asymptomatic. Home medications include PPI/Antacids as needed.  Plan:  -Continue PPI/Antacids as needed         Anxiety and depression  Chronic. Stable. Not in acute exacerbation and currently denies endorsing any suicidal/homicidal ideations.   Plan:  -Continue home medications (xanax and zoloft)        VTE Risk Mitigation (From admission, onward)           Ordered     IP VTE HIGH RISK PATIENT  Once         01/24/25 2354     Place sequential compression device  Until discontinued         01/24/25 2354                    Discharge Planning   HERNANDO:      Code Status: Full Code   Medical Readiness for Discharge Date:   Discharge Plan A: Home, Home with family                      Farhat Mason MD  Department of Hospital Medicine   [facility] - Telemetry ([facility])

## 2025-01-26 NOTE — NURSING
Pt. Nasopharyngeal swab did not result in EPIC a faxed copy of the results were placed in her chart. Rhinovirus detected, droplet precautions initiated. MD notified.

## 2025-01-27 LAB
ANA PATTERN 1: NORMAL
ANA SER QL IF: POSITIVE
ANA TITR SER IF: NORMAL {TITER}
ANION GAP SERPL CALC-SCNC: 10 MMOL/L (ref 8–16)
BUN SERPL-MCNC: 16 MG/DL (ref 8–23)
CALCIUM SERPL-MCNC: 9.6 MG/DL (ref 8.7–10.5)
CHLORIDE SERPL-SCNC: 105 MMOL/L (ref 95–110)
CO2 SERPL-SCNC: 24 MMOL/L (ref 23–29)
CREAT SERPL-MCNC: 0.6 MG/DL (ref 0.5–1.4)
EST. GFR  (NO RACE VARIABLE): >60 ML/MIN/1.73 M^2
GLUCOSE SERPL-MCNC: 145 MG/DL (ref 70–110)
POTASSIUM SERPL-SCNC: 3.5 MMOL/L (ref 3.5–5.1)
SODIUM SERPL-SCNC: 139 MMOL/L (ref 136–145)

## 2025-01-27 PROCEDURE — 36415 COLL VENOUS BLD VENIPUNCTURE: CPT | Performed by: FAMILY MEDICINE

## 2025-01-27 PROCEDURE — 86160 COMPLEMENT ANTIGEN: CPT

## 2025-01-27 PROCEDURE — 94640 AIRWAY INHALATION TREATMENT: CPT

## 2025-01-27 PROCEDURE — 87070 CULTURE OTHR SPECIMN AEROBIC: CPT

## 2025-01-27 PROCEDURE — 27000207 HC ISOLATION

## 2025-01-27 PROCEDURE — 97165 OT EVAL LOW COMPLEX 30 MIN: CPT

## 2025-01-27 PROCEDURE — 36415 COLL VENOUS BLD VENIPUNCTURE: CPT

## 2025-01-27 PROCEDURE — 25000242 PHARM REV CODE 250 ALT 637 W/ HCPCS: Performed by: FAMILY MEDICINE

## 2025-01-27 PROCEDURE — 27000646 HC AEROBIKA DEVICE

## 2025-01-27 PROCEDURE — 63600175 PHARM REV CODE 636 W HCPCS: Mod: JZ,TB | Performed by: INTERNAL MEDICINE

## 2025-01-27 PROCEDURE — 25000003 PHARM REV CODE 250: Performed by: FAMILY MEDICINE

## 2025-01-27 PROCEDURE — 25000003 PHARM REV CODE 250: Performed by: NURSE PRACTITIONER

## 2025-01-27 PROCEDURE — 87205 SMEAR GRAM STAIN: CPT

## 2025-01-27 PROCEDURE — 94761 N-INVAS EAR/PLS OXIMETRY MLT: CPT

## 2025-01-27 PROCEDURE — 97116 GAIT TRAINING THERAPY: CPT

## 2025-01-27 PROCEDURE — 94664 DEMO&/EVAL PT USE INHALER: CPT

## 2025-01-27 PROCEDURE — 86160 COMPLEMENT ANTIGEN: CPT | Mod: 59

## 2025-01-27 PROCEDURE — 63600175 PHARM REV CODE 636 W HCPCS: Performed by: NURSE PRACTITIONER

## 2025-01-27 PROCEDURE — 87106 FUNGI IDENTIFICATION YEAST: CPT

## 2025-01-27 PROCEDURE — 99900035 HC TECH TIME PER 15 MIN (STAT)

## 2025-01-27 PROCEDURE — 27000221 HC OXYGEN, UP TO 24 HOURS

## 2025-01-27 PROCEDURE — 97161 PT EVAL LOW COMPLEX 20 MIN: CPT

## 2025-01-27 PROCEDURE — 80048 BASIC METABOLIC PNL TOTAL CA: CPT | Performed by: FAMILY MEDICINE

## 2025-01-27 PROCEDURE — 21400001 HC TELEMETRY ROOM

## 2025-01-27 PROCEDURE — 63600175 PHARM REV CODE 636 W HCPCS: Performed by: FAMILY MEDICINE

## 2025-01-27 RX ORDER — ELECTROLYTES/DEXTROSE
400 SOLUTION, ORAL ORAL
Status: DISCONTINUED | OUTPATIENT
Start: 2025-01-27 | End: 2025-02-01 | Stop reason: HOSPADM

## 2025-01-27 RX ADMIN — Medication 400 ML: at 10:01

## 2025-01-27 RX ADMIN — ALBUTEROL SULFATE 2.5 MG: 0.83 SOLUTION RESPIRATORY (INHALATION) at 01:01

## 2025-01-27 RX ADMIN — GUAIFENESIN 600 MG: 600 TABLET, EXTENDED RELEASE ORAL at 08:01

## 2025-01-27 RX ADMIN — ALBUTEROL SULFATE 2.5 MG: 0.83 SOLUTION RESPIRATORY (INHALATION) at 07:01

## 2025-01-27 RX ADMIN — PANTOPRAZOLE SODIUM 40 MG: 40 TABLET, DELAYED RELEASE ORAL at 08:01

## 2025-01-27 RX ADMIN — Medication 400 ML: at 06:01

## 2025-01-27 RX ADMIN — METHYLPREDNISOLONE SODIUM SUCCINATE 40 MG: 40 INJECTION, POWDER, FOR SOLUTION INTRAMUSCULAR; INTRAVENOUS at 08:01

## 2025-01-27 RX ADMIN — Medication 400 ML: at 02:01

## 2025-01-27 RX ADMIN — POTASSIUM CHLORIDE 40 MEQ: 1500 TABLET, EXTENDED RELEASE ORAL at 08:01

## 2025-01-27 RX ADMIN — FUROSEMIDE 20 MG: 10 INJECTION, SOLUTION INTRAMUSCULAR; INTRAVENOUS at 08:01

## 2025-01-27 RX ADMIN — DOXYCYCLINE HYCLATE 100 MG: 100 TABLET, COATED ORAL at 08:01

## 2025-01-27 RX ADMIN — METOPROLOL SUCCINATE 50 MG: 50 TABLET, EXTENDED RELEASE ORAL at 08:01

## 2025-01-27 RX ADMIN — CEFTRIAXONE 1 G: 1 INJECTION, POWDER, FOR SOLUTION INTRAMUSCULAR; INTRAVENOUS at 02:01

## 2025-01-27 RX ADMIN — ALBUTEROL SULFATE 2.5 MG: 0.83 SOLUTION RESPIRATORY (INHALATION) at 08:01

## 2025-01-27 RX ADMIN — SERTRALINE HYDROCHLORIDE 100 MG: 50 TABLET ORAL at 08:01

## 2025-01-27 RX ADMIN — ATORVASTATIN CALCIUM 10 MG: 10 TABLET, FILM COATED ORAL at 08:01

## 2025-01-27 RX ADMIN — Medication 400 ML: at 08:01

## 2025-01-27 RX ADMIN — Medication 400 ML: at 09:01

## 2025-01-27 RX ADMIN — CLOPIDOGREL BISULFATE 75 MG: 75 TABLET ORAL at 08:01

## 2025-01-27 RX ADMIN — ALPRAZOLAM 0.5 MG: 0.5 TABLET ORAL at 08:01

## 2025-01-27 RX ADMIN — ASPIRIN 81 MG: 81 TABLET, COATED ORAL at 08:01

## 2025-01-27 NOTE — ASSESSMENT & PLAN NOTE
Patient has a diagnosis of pneumonia. The cause of the pneumonia is viral in etiology due to  rhinovirus . The pneumonia is improving. The patient has the following signs/symptoms of pneumonia: cough, sputum production, and shortness of breath. The patient does have a current oxygen requirement and the patient does not have a home oxygen requirement. I have reviewed the pertinent imaging. The following cultures have been collected: Blood cultures The culture results are listed below.     Current antimicrobial regimen consists of the antibiotics listed below. Will monitor patient closely and continue current treatment plan unchanged.    Antibiotics (From admission, onward)      Start     Stop Route Frequency Ordered    01/25/25 2100  doxycycline tablet 100 mg         -- Oral Every 12 hours 01/25/25 1809    01/25/25 1400  cefTRIAXone injection 1 g         -- IV Every 24 hours (non-standard times) 01/24/25 2351            Microbiology Results (last 7 days)       Procedure Component Value Units Date/Time    Respiratory Infection Panel (PCR), Nasopharyngeal [[id number]]  (Abnormal) Collected: 01/25/25 1406    Order Status: Completed Specimen: Nasopharyngeal Swab Updated: 01/27/25 1211     Respiratory Infection Panel Source NP Swab     Adenovirus Not Detected     Coronavirus 229E, Common Cold Virus Not Detected     Coronavirus HKU1, Common Cold Virus Not Detected     Coronavirus NL63, Common Cold Virus Not Detected     Coronavirus OC43, Common Cold Virus Not Detected     Comment: The Coronavirus strains detected in this test cause the common cold.  These strains are not the COVID-19 (novel Coronavirus)strain   associated with the respiratory disease outbreak.          SARS-CoV2 (COVID-19) Qualitative PCR Not Detected     Human Metapneumovirus Not Detected     Human Rhinovirus/Enterovirus Detected     Influenza A (subtypes H1, H1-2009,H3) Not Detected     Influenza B Not Detected     Parainfluenza Virus 1 Not Detected      Parainfluenza Virus 2 Not Detected     Parainfluenza Virus 3 Not Detected     Parainfluenza Virus 4 Not Detected     Respiratory Syncytial Virus Not Detected     Bordetella Parapertussis (UO2580) Not Detected     Bordetella pertussis (ptxP) Not Detected     Chlamydia pneumoniae Not Detected     Mycoplasma pneumoniae Not Detected     Comment: Respiratory Infection Panel testing performed by Multiplex PCR.       Narrative:      Assay not valid for lower respiratory specimens, alternate  testing required.    Blood culture x two cultures. Draw prior to antibiotics. [[id number]] Collected: 01/24/25 1250    Order Status: Completed Specimen: Blood from Peripheral, Forearm, Left Updated: 01/27/25 0612     Blood Culture, Routine No Growth to date      No Growth to date      No Growth to date    Narrative:      Aerobic and anaerobic    Blood culture x two cultures. Draw prior to antibiotics. [[id number]] Collected: 01/24/25 1256    Order Status: Completed Specimen: Blood from Peripheral, Antecubital, Right Updated: 01/27/25 0612     Blood Culture, Routine No Growth to date      No Growth to date      No Growth to date    Narrative:      Aerobic and anaerobic        Continue current treatment as still requiring 4L nasal cannula   Add incentive spirometer   Physical/occupational therapy recommending homehealth physical/occupational therapy

## 2025-01-27 NOTE — ASSESSMENT & PLAN NOTE
"- Patient has a diagnosis of pneumonia. The cause of the pneumonia is suspected to be bacterial in etiology but organism is not known. The pneumonia is stable. The patient has the following signs/symptoms of pneumonia: persistent hypoxia  and cough. The patient does have a current oxygen requirement and the patient does have a home oxygen requirement. I have reviewed the pertinent imaging. The following cultures have been collected: Blood cultures and Sputum culture The culture results are listed below.   - Current antimicrobial regimen consists of the antibiotics listed below. Will monitor patient closely and continue current treatment plan unchanged.  - Now with productive cough; "thick, brown" sputum - collect sputum cx  - Continue rocephin + doxy  - Mycoplasm, legionella, fungitell pending    Antibiotics (From admission, onward)      Start     Stop Route Frequency Ordered    01/25/25 2100  doxycycline tablet 100 mg         -- Oral Every 12 hours 01/25/25 1809    01/25/25 1400  cefTRIAXone injection 1 g         -- IV Every 24 hours (non-standard times) 01/24/25 2351            Microbiology Results (last 7 days)       Procedure Component Value Units Date/Time    Culture, Respiratory with Gram Stain [[id number]]     Order Status: No result Specimen: Respiratory from Endotracheal Aspirate     Respiratory Infection Panel (PCR), Nasopharyngeal [[id number]]  (Abnormal) Collected: 01/25/25 1406    Order Status: Completed Specimen: Nasopharyngeal Swab Updated: 01/27/25 1211     Respiratory Infection Panel Source NP Swab     Adenovirus Not Detected     Coronavirus 229E, Common Cold Virus Not Detected     Coronavirus HKU1, Common Cold Virus Not Detected     Coronavirus NL63, Common Cold Virus Not Detected     Coronavirus OC43, Common Cold Virus Not Detected     Comment: The Coronavirus strains detected in this test cause the common cold.  These strains are not the COVID-19 (novel Coronavirus)strain   associated with the " respiratory disease outbreak.          SARS-CoV2 (COVID-19) Qualitative PCR Not Detected     Human Metapneumovirus Not Detected     Human Rhinovirus/Enterovirus Detected     Influenza A (subtypes H1, H1-2009,H3) Not Detected     Influenza B Not Detected     Parainfluenza Virus 1 Not Detected     Parainfluenza Virus 2 Not Detected     Parainfluenza Virus 3 Not Detected     Parainfluenza Virus 4 Not Detected     Respiratory Syncytial Virus Not Detected     Bordetella Parapertussis (CJ0260) Not Detected     Bordetella pertussis (ptxP) Not Detected     Chlamydia pneumoniae Not Detected     Mycoplasma pneumoniae Not Detected     Comment: Respiratory Infection Panel testing performed by Multiplex PCR.       Narrative:      Assay not valid for lower respiratory specimens, alternate  testing required.    Blood culture x two cultures. Draw prior to antibiotics. [[id number]] Collected: 01/24/25 1250    Order Status: Completed Specimen: Blood from Peripheral, Forearm, Left Updated: 01/27/25 0612     Blood Culture, Routine No Growth to date      No Growth to date      No Growth to date    Narrative:      Aerobic and anaerobic    Blood culture x two cultures. Draw prior to antibiotics. [[id number]] Collected: 01/24/25 1256    Order Status: Completed Specimen: Blood from Peripheral, Antecubital, Right Updated: 01/27/25 0612     Blood Culture, Routine No Growth to date      No Growth to date      No Growth to date    Narrative:      Aerobic and anaerobic

## 2025-01-27 NOTE — ASSESSMENT & PLAN NOTE
The likely etiology of thrombocytopenia is medication induced, the suspected medication(s) is/are plavix . The patients 3 most recent labs are listed below.  Recent Labs     01/25/25  0426   *       Plan  - Will transfuse if platelet count is <50k (if undergoing surgical procedure or have active bleeding).  - monitor daily

## 2025-01-27 NOTE — PROGRESS NOTES
"[facility] - Telemetry ([facility])  Pulmonology  Progress Note    Patient Name: Linda Segura  MRN: [id number]  Admission Date: 1/24/2025  Hospital Length of Stay: 3 days  Code Status: Full Code  Attending Provider: Farhat Mason MD  Primary Care Provider: Rolly Hammond MD   Principal Problem: Multifocal pneumonia    Subjective:   Linda Segura is 74 y.o.   Asked to see for abn CXR and chest CT  Daughter bedside  Seen in urgent care   2-3 days cough weakness fever 101 chills  Nonproductive sputum   Denies occupation exposures   No prior history of illness   No prior outpatient treatment   Never smoker   Retired  She is currently on 4 L of oxygen   Past medical history anxiety depression CVA hypertension hypothyroidism.    Notable labs , lactate 3.7, flu COVID negative.  X-ray reviewed   Chest CT reviewed   Currently on ceftriaxone and doxycycline.    Culture data unrevealing so far     01/27/2025: Feeling a little better today. Still complains of some exertional dyspnea and pleuritic chest pain worse with coughing fits. Productive cough with "thick, brown" sputum. 4L NC. Using Acapella.     Objective:     Vital Signs (Most Recent):  Temp: 97.6 °F (36.4 °C) (01/27/25 1631)  Pulse: 91 (01/27/25 1631)  Resp: 16 (01/27/25 1631)  BP: 121/60 (01/27/25 1631)  SpO2: (!) 91 % (01/27/25 1631) Vital Signs (24h Range):  Temp:  [97.6 °F (36.4 °C)-98.7 °F (37.1 °C)] 97.6 °F (36.4 °C)  Pulse:  [82-94] 91  Resp:  [16-18] 16  SpO2:  [88 %-94 %] 91 %  BP: (112-139)/(53-63) 121/60     Weight: 74.4 kg (164 lb 0.4 oz)  Body mass index is 26.47 kg/m².  Intake/Output Summary (Last 24 hours) at 1/27/2025 1721  Last data filed at 1/27/2025 1400  Gross per 24 hour   Intake 1720 ml   Output 400 ml   Net 1320 ml     Physical Exam  Constitutional:       General: She is not in acute distress.  HENT:      Head: Normocephalic.      Mouth/Throat:      Mouth: Mucous membranes are moist.   Eyes:      Conjunctiva/sclera: Conjunctivae " normal.      Pupils: Pupils are equal, round, and reactive to light.   Cardiovascular:      Pulses: Normal pulses.   Pulmonary:      Effort: Pulmonary effort is normal.      Breath sounds: Rales (improving) present. No wheezing.      Comments: 4L NC  Skin:     General: Skin is warm.      Capillary Refill: Capillary refill takes less than 2 seconds.      Coloration: Skin is not jaundiced.      Findings: No bruising.   Neurological:      Mental Status: She is alert and oriented to person, place, and time.     Review of Systems   Constitutional:  Negative for chills, fatigue and fever.   Respiratory:  Positive for cough and shortness of breath (exertional). Negative for chest tightness.    Cardiovascular:  Positive for chest pain (pleuritic, worse with coughing).   Gastrointestinal:  Negative for abdominal pain, nausea and vomiting.   Genitourinary:  Negative for dysuria.   Neurological:  Positive for weakness. Negative for dizziness and headaches.   Psychiatric/Behavioral:  Negative for agitation and confusion.      Vents:  Oxygen Concentration (%): 36 (01/27/25 0500)    Lines/Drains/Airways       Peripheral Intravenous Line  Duration                  Peripheral IV - Single Lumen 01/25/25 2012 20 G Posterior;Right Hand 1 day                  Significant Labs:  Chemistries:  Recent Labs   Lab 01/27/25  0539      K 3.5      CO2 24   BUN 16   CREATININE 0.6   CALCIUM 9.6     Significant Imaging:  I have reviewed all pertinent imaging results/findings within the past 24 hours.  I have reviewed and interpreted all pertinent imaging results/findings within the past 24 hours.    Assessment/Plan:     Pulmonary  * Multifocal pneumonia  - Patient has a diagnosis of pneumonia. The cause of the pneumonia is suspected to be bacterial in etiology but organism is not known. The pneumonia is stable. The patient has the following signs/symptoms of pneumonia: persistent hypoxia  and cough. The patient does have a current  "oxygen requirement and the patient does have a home oxygen requirement. I have reviewed the pertinent imaging. The following cultures have been collected: Blood cultures and Sputum culture The culture results are listed below.   - Current antimicrobial regimen consists of the antibiotics listed below. Will monitor patient closely and continue current treatment plan unchanged.  - Now with productive cough; "thick, brown" sputum - collect sputum cx  - Continue rocephin + doxy  - Mycoplasm, legionella, fungitell pending    Antibiotics (From admission, onward)      Start     Stop Route Frequency Ordered    01/25/25 2100  doxycycline tablet 100 mg         -- Oral Every 12 hours 01/25/25 1809    01/25/25 1400  cefTRIAXone injection 1 g         -- IV Every 24 hours (non-standard times) 01/24/25 2351            Microbiology Results (last 7 days)       Procedure Component Value Units Date/Time    Culture, Respiratory with Gram Stain [[id number]]     Order Status: No result Specimen: Respiratory from Endotracheal Aspirate     Respiratory Infection Panel (PCR), Nasopharyngeal [[id number]]  (Abnormal) Collected: 01/25/25 1406    Order Status: Completed Specimen: Nasopharyngeal Swab Updated: 01/27/25 1211     Respiratory Infection Panel Source NP Swab     Adenovirus Not Detected     Coronavirus 229E, Common Cold Virus Not Detected     Coronavirus HKU1, Common Cold Virus Not Detected     Coronavirus NL63, Common Cold Virus Not Detected     Coronavirus OC43, Common Cold Virus Not Detected     Comment: The Coronavirus strains detected in this test cause the common cold.  These strains are not the COVID-19 (novel Coronavirus)strain   associated with the respiratory disease outbreak.          SARS-CoV2 (COVID-19) Qualitative PCR Not Detected     Human Metapneumovirus Not Detected     Human Rhinovirus/Enterovirus Detected     Influenza A (subtypes H1, H1-2009,H3) Not Detected     Influenza B Not Detected     Parainfluenza Virus 1 Not " Detected     Parainfluenza Virus 2 Not Detected     Parainfluenza Virus 3 Not Detected     Parainfluenza Virus 4 Not Detected     Respiratory Syncytial Virus Not Detected     Bordetella Parapertussis (QU6179) Not Detected     Bordetella pertussis (ptxP) Not Detected     Chlamydia pneumoniae Not Detected     Mycoplasma pneumoniae Not Detected     Comment: Respiratory Infection Panel testing performed by Multiplex PCR.       Narrative:      Assay not valid for lower respiratory specimens, alternate  testing required.    Blood culture x two cultures. Draw prior to antibiotics. [[id number]] Collected: 01/24/25 1250    Order Status: Completed Specimen: Blood from Peripheral, Forearm, Left Updated: 01/27/25 0612     Blood Culture, Routine No Growth to date      No Growth to date      No Growth to date    Narrative:      Aerobic and anaerobic    Blood culture x two cultures. Draw prior to antibiotics. [[id number]] Collected: 01/24/25 1256    Order Status: Completed Specimen: Blood from Peripheral, Antecubital, Right Updated: 01/27/25 0612     Blood Culture, Routine No Growth to date      No Growth to date      No Growth to date    Narrative:      Aerobic and anaerobic            Acute hypoxemic respiratory failure  - Patient with Hypoxic Respiratory failure which is Acute. She is not on home oxygen. Supplemental oxygen was provided and noted- Oxygen Concentration (%):  [36] 36  - Signs/symptoms of respiratory failure include- increased work of breathing and respiratory distress. Contributing diagnoses includes - Pneumonia Labs and images were reviewed. Patient Has not had a recent ABG. Will treat underlying causes and adjust management of respiratory failure as follows-   - Currently on 4 liters/minute oxygen, continue to wean as able  - Continue scheduled nebs  - Empiric CAP coverage, sputum cx if able  - Monitor for worsening fevers, WBC; CXR if worsening respiratory status  - + SASKIA screen, elevated CRP/ESR; complements  pending    ID  Rhinovirus infection  - Continue supportive care  - See plan respiratory failure     José Luis Lee PA-C  Pulmonology  [facility] - Tele[facility] ([facility])

## 2025-01-27 NOTE — ASSESSMENT & PLAN NOTE
-continue ASA, plavix, and lipitor  Thrombocytopenia noted   Continue plavix  Discontinue lovenox

## 2025-01-27 NOTE — PLAN OF CARE
Problem: Adult Inpatient Plan of Care  Goal: Plan of Care Review  Outcome: Progressing  Goal: Patient-Specific Goal (Individualized)  Outcome: Progressing  Goal: Absence of Hospital-Acquired Illness or Injury  Outcome: Progressing  Goal: Optimal Comfort and Wellbeing  Outcome: Progressing  Goal: Readiness for Transition of Care  Outcome: Progressing     Problem: Pneumonia  Goal: Fluid Balance  Outcome: Progressing  Goal: Resolution of Infection Signs and Symptoms  Outcome: Progressing  Goal: Effective Oxygenation and Ventilation  Outcome: Progressing     Problem: Skin Injury Risk Increased  Goal: Skin Health and Integrity  Outcome: Progressing     Problem: Fall Injury Risk  Goal: Absence of Fall and Fall-Related Injury  Outcome: Progressing     Problem: Airway Clearance Ineffective  Goal: Effective Airway Clearance  Outcome: Progressing

## 2025-01-27 NOTE — SUBJECTIVE & OBJECTIVE
Objective:     Vital Signs (Most Recent):  Temp: 97.6 °F (36.4 °C) (01/27/25 1631)  Pulse: 91 (01/27/25 1631)  Resp: 16 (01/27/25 1631)  BP: 121/60 (01/27/25 1631)  SpO2: (!) 91 % (01/27/25 1631) Vital Signs (24h Range):  Temp:  [97.6 °F (36.4 °C)-98.7 °F (37.1 °C)] 97.6 °F (36.4 °C)  Pulse:  [82-94] 91  Resp:  [16-18] 16  SpO2:  [88 %-94 %] 91 %  BP: (112-139)/(53-63) 121/60     Weight: 74.4 kg (164 lb 0.4 oz)  Body mass index is 26.47 kg/m².  Intake/Output Summary (Last 24 hours) at 1/27/2025 1721  Last data filed at 1/27/2025 1400  Gross per 24 hour   Intake 1720 ml   Output 400 ml   Net 1320 ml     Physical Exam  Constitutional:       General: She is not in acute distress.  HENT:      Head: Normocephalic.      Mouth/Throat:      Mouth: Mucous membranes are moist.   Eyes:      Conjunctiva/sclera: Conjunctivae normal.      Pupils: Pupils are equal, round, and reactive to light.   Cardiovascular:      Pulses: Normal pulses.   Pulmonary:      Effort: Pulmonary effort is normal.      Breath sounds: Rales (improving) present. No wheezing.      Comments: 4L NC  Skin:     General: Skin is warm.      Capillary Refill: Capillary refill takes less than 2 seconds.      Coloration: Skin is not jaundiced.      Findings: No bruising.   Neurological:      Mental Status: She is alert and oriented to person, place, and time.     Review of Systems   Constitutional:  Negative for chills, fatigue and fever.   Respiratory:  Positive for cough and shortness of breath (exertional). Negative for chest tightness.    Cardiovascular:  Positive for chest pain (pleuritic, worse with coughing).   Gastrointestinal:  Negative for abdominal pain, nausea and vomiting.   Genitourinary:  Negative for dysuria.   Neurological:  Positive for weakness. Negative for dizziness and headaches.   Psychiatric/Behavioral:  Negative for agitation and confusion.      Vents:  Oxygen Concentration (%): 36 (01/27/25 0500)    Lines/Drains/Airways       Peripheral  Intravenous Line  Duration                  Peripheral IV - Single Lumen 01/25/25 2012 20 G Posterior;Right Hand 1 day                  Significant Labs:  Chemistries:  Recent Labs   Lab 01/27/25  0539      K 3.5      CO2 24   BUN 16   CREATININE 0.6   CALCIUM 9.6     Significant Imaging:  I have reviewed all pertinent imaging results/findings within the past 24 hours.  I have reviewed and interpreted all pertinent imaging results/findings within the past 24 hours.

## 2025-01-27 NOTE — SUBJECTIVE & OBJECTIVE
Interval History: See hospital course for today      Review of Systems   Constitutional:  Positive for activity change (improving), appetite change (improving) and fatigue (improving). Negative for fever.   Respiratory:  Positive for cough (improving) and shortness of breath (improving).    Cardiovascular:  Negative for chest pain.   Gastrointestinal:  Negative for abdominal pain, nausea and vomiting.   Neurological:  Positive for weakness (improving).   Psychiatric/Behavioral:  Positive for dysphoric mood. Negative for agitation, behavioral problems, confusion and decreased concentration. The patient is not nervous/anxious.      Objective:     Vital Signs (Most Recent):  Temp: 98.5 °F (36.9 °C) (01/27/25 1236)  Pulse: 92 (01/27/25 1453)  Resp: 17 (01/27/25 1308)  BP: (!) 112/53 (01/27/25 1236)  SpO2: (!) 93 % (01/27/25 1308) Vital Signs (24h Range):  Temp:  [97.9 °F (36.6 °C)-98.7 °F (37.1 °C)] 98.5 °F (36.9 °C)  Pulse:  [82-95] 92  Resp:  [16-18] 17  SpO2:  [88 %-94 %] 93 %  BP: (112-140)/(53-70) 112/53     Weight: 74.4 kg (164 lb 0.4 oz)  Body mass index is 26.47 kg/m².    Intake/Output Summary (Last 24 hours) at 1/27/2025 1501  Last data filed at 1/27/2025 1000  Gross per 24 hour   Intake 1560 ml   Output 400 ml   Net 1160 ml         Physical Exam  Vitals and nursing note reviewed. Exam conducted with a chaperone present (visitor).   Constitutional:       General: She is not in acute distress.     Appearance: She is ill-appearing. She is not toxic-appearing.      Interventions: Nasal cannula in place.   HENT:      Head: Normocephalic and atraumatic.      Comments: Edentulous   Cardiovascular:      Rate and Rhythm: Normal rate.   Pulmonary:      Effort: Pulmonary effort is normal. Tachypnea present. No respiratory distress.      Breath sounds: Decreased air movement present. Rales present. No wheezing.   Abdominal:      Palpations: Abdomen is soft.      Tenderness: There is no abdominal tenderness.    Musculoskeletal:      Right lower leg: No edema.      Left lower leg: No edema.   Skin:     General: Skin is warm.   Neurological:      Mental Status: She is alert and oriented to person, place, and time.      Motor: Weakness present.   Psychiatric:         Mood and Affect: Mood is depressed.             Significant Labs: All pertinent labs within the past 24 hours have been reviewed.  Blood Culture: negative growth to date x 3 days  Respiratory infection panel positive for rhinovirus  CMP:   Recent Labs   Lab 01/27/25  0539      K 3.5      CO2 24   *   BUN 16   CREATININE 0.6   CALCIUM 9.6   ANIONGAP 10       Significant Imaging: I have reviewed all pertinent imaging results/findings within the past 24 hours.

## 2025-01-27 NOTE — HOSPITAL COURSE
"01/27/2025: Feeling a little better today. Still complains of some exertional dyspnea and pleuritic chest pain worse with coughing fits. Productive cough with "thick, brown" sputum. 4L NC. Using Acapella.     01/28/2025: This am abg done on high flow so switched to vapotherm. Steroids started as well.    01/29/2025: Weaning vapotherm down to 25/60. Receiving lasix and good UOP  "

## 2025-01-27 NOTE — ASSESSMENT & PLAN NOTE
Patient with Hypoxic Respiratory failure which is Acute.  she is not on home oxygen. Supplemental oxygen was provided and noted- Oxygen Concentration (%):  [36] 36    .   Signs/symptoms of respiratory failure include- tachypnea, increased work of breathing, and respiratory distress. Contributing diagnoses includes - Pneumonia Labs and images were reviewed. Patient Has not had a recent ABG. Will treat underlying causes and adjust management of respiratory failure as follows- continue current regimen    Wean as able  Add incentive spirometer

## 2025-01-27 NOTE — ASSESSMENT & PLAN NOTE
Chronic, controlled.  Latest blood pressure and vitals reviewed-   Temp:  [97.9 °F (36.6 °C)-98.7 °F (37.1 °C)]   Pulse:  [82-95]   Resp:  [16-18]   BP: (112-140)/(53-70)   SpO2:  [88 %-94 %] .   Home meds for hypertension were reviewed and noted below.   Hypertension Medications               metoprolol succinate (TOPROL-XL) 50 MG 24 hr tablet Take 1 tablet by mouth once daily.            While in the hospital, will manage blood pressure as follows; Continue home antihypertensive regimen    Will utilize p.r.n. blood pressure medication only if patient's blood pressure greater than  180/110 and she develops symptoms such as worsening chest pain or shortness of breath.

## 2025-01-27 NOTE — PROGRESS NOTES
[facility])  [facility]  Progress Note    Patient Name: Linda Segura  MRN: [id number]  Patient Class: IP- Inpatient   Admission Date: 1/24/2025  Length of Stay: 3 days  Attending Physician: Farhat Mason MD  Primary Care Provider: Rolly Hammond MD        Subjective     Principal Problem:Multifocal pneumonia        HPI:  Linda Segura is a 74 y.o. female with a PMH  has a past medical history of Abnormal Pap smear of vagina, Anxiety, Anxiety and depression, Cancer, CVA (cerebral vascular accident), Essential hypertension (6/12/2018), Mental disorder, Other specified hypothyroidism (2/13/2021), and Scoliosis.  Presented to outside facility for evaluation of productive cough and generalized weakness with fever of 101 with associated chills started 2 days ago.  Denies any recent illnesses or sick contacts.  Reports minimal relief with over-the-counter medications.  Denies any chest pain, palpitations, shortness for breath, dyspnea exertion, or any other symptoms.    ER workup revealed CBC to be unremarkable.  CMP revealed potassium 2.8 with CBG of 240 mg/dL.  .  Troponin 0.029.  Initial lactic acid 3.7 with repeat lactic acid of 1.1.  Procalcitonin level 0.71.  Flu/COVID negative.  UA unremarkable.  X-ray revealed development of multilobar pneumonia.  EKG revealed normal sinus rhythm with prolonged QT with a ventricular rate of 82 beats per minute and a QT/QTC of 428/500.  Initial O2 saturation of 88% on room air with currently 96% on 4 liters/minute via nasal cannula.  Patient received 500 mg azithromycin 1 g Rocephin IV at outside facility.  Patient also received 10 mEq potassium IV and 1, 848 cc normal saline bolus.  Hospital Medicine consulted to admit patient for multifocal pneumonia.  Patient in agreement with treatment plan.  Patient admitted under inpatient status.    PCP: Rolly Hammond      Overview/Hospital Course:  1/26 admitted for pneumonia. Respiratory  infection panel positive for rhinovirus. Endorses improvement in symptoms of cough and dyspnea but continues with weakness and poor po intake. Pulmonology consulted for pneumonia and pleural effusion. Continue current regimen.  1/27 endorses improvement in symptoms of decreased appetite, weakness, breathing and cough. Still requiring high level of supplemental oxygen, 4L nasal cannula. Physical/occupational therapy recommending homehealth physical/occupational therapy.    Interval History: See hospital course for today      Review of Systems   Constitutional:  Positive for activity change (improving), appetite change (improving) and fatigue (improving). Negative for fever.   Respiratory:  Positive for cough (improving) and shortness of breath (improving).    Cardiovascular:  Negative for chest pain.   Gastrointestinal:  Negative for abdominal pain, nausea and vomiting.   Neurological:  Positive for weakness (improving).   Psychiatric/Behavioral:  Positive for dysphoric mood. Negative for agitation, behavioral problems, confusion and decreased concentration. The patient is not nervous/anxious.      Objective:     Vital Signs (Most Recent):  Temp: 98.5 °F (36.9 °C) (01/27/25 1236)  Pulse: 92 (01/27/25 1453)  Resp: 17 (01/27/25 1308)  BP: (!) 112/53 (01/27/25 1236)  SpO2: (!) 93 % (01/27/25 1308) Vital Signs (24h Range):  Temp:  [97.9 °F (36.6 °C)-98.7 °F (37.1 °C)] 98.5 °F (36.9 °C)  Pulse:  [82-95] 92  Resp:  [16-18] 17  SpO2:  [88 %-94 %] 93 %  BP: (112-140)/(53-70) 112/53     Weight: 74.4 kg (164 lb 0.4 oz)  Body mass index is 26.47 kg/m².    Intake/Output Summary (Last 24 hours) at 1/27/2025 1501  Last data filed at 1/27/2025 1000  Gross per 24 hour   Intake 1560 ml   Output 400 ml   Net 1160 ml         Physical Exam  Vitals and nursing note reviewed. Exam conducted with a chaperone present (visitor).   Constitutional:       General: She is not in acute distress.     Appearance: She is ill-appearing. She is not  toxic-appearing.      Interventions: Nasal cannula in place.   HENT:      Head: Normocephalic and atraumatic.      Comments: Edentulous   Cardiovascular:      Rate and Rhythm: Normal rate.   Pulmonary:      Effort: Pulmonary effort is normal. Tachypnea present. No respiratory distress.      Breath sounds: Decreased air movement present. Rales present. No wheezing.   Abdominal:      Palpations: Abdomen is soft.      Tenderness: There is no abdominal tenderness.   Musculoskeletal:      Right lower leg: No edema.      Left lower leg: No edema.   Skin:     General: Skin is warm.   Neurological:      Mental Status: She is alert and oriented to person, place, and time.      Motor: Weakness present.   Psychiatric:         Mood and Affect: Mood is depressed.             Significant Labs: All pertinent labs within the past 24 hours have been reviewed.  Blood Culture: negative growth to date x 3 days  Respiratory infection panel positive for rhinovirus  CMP:   Recent Labs   Lab 01/27/25  0539      K 3.5      CO2 24   *   BUN 16   CREATININE 0.6   CALCIUM 9.6   ANIONGAP 10       Significant Imaging: I have reviewed all pertinent imaging results/findings within the past 24 hours.    Assessment and Plan     * Multifocal pneumonia  Patient has a diagnosis of pneumonia. The cause of the pneumonia is viral in etiology due to  rhinovirus . The pneumonia is improving. The patient has the following signs/symptoms of pneumonia: cough, sputum production, and shortness of breath. The patient does have a current oxygen requirement and the patient does not have a home oxygen requirement. I have reviewed the pertinent imaging. The following cultures have been collected: Blood cultures The culture results are listed below.     Current antimicrobial regimen consists of the antibiotics listed below. Will monitor patient closely and continue current treatment plan unchanged.    Antibiotics (From admission, onward)      Start      Stop Route Frequency Ordered    01/25/25 2100  doxycycline tablet 100 mg         -- Oral Every 12 hours 01/25/25 1809    01/25/25 1400  cefTRIAXone injection 1 g         -- IV Every 24 hours (non-standard times) 01/24/25 2351            Microbiology Results (last 7 days)       Procedure Component Value Units Date/Time    Respiratory Infection Panel (PCR), Nasopharyngeal [[id number]]  (Abnormal) Collected: 01/25/25 1406    Order Status: Completed Specimen: Nasopharyngeal Swab Updated: 01/27/25 1211     Respiratory Infection Panel Source NP Swab     Adenovirus Not Detected     Coronavirus 229E, Common Cold Virus Not Detected     Coronavirus HKU1, Common Cold Virus Not Detected     Coronavirus NL63, Common Cold Virus Not Detected     Coronavirus OC43, Common Cold Virus Not Detected     Comment: The Coronavirus strains detected in this test cause the common cold.  These strains are not the COVID-19 (novel Coronavirus)strain   associated with the respiratory disease outbreak.          SARS-CoV2 (COVID-19) Qualitative PCR Not Detected     Human Metapneumovirus Not Detected     Human Rhinovirus/Enterovirus Detected     Influenza A (subtypes H1, H1-2009,H3) Not Detected     Influenza B Not Detected     Parainfluenza Virus 1 Not Detected     Parainfluenza Virus 2 Not Detected     Parainfluenza Virus 3 Not Detected     Parainfluenza Virus 4 Not Detected     Respiratory Syncytial Virus Not Detected     Bordetella Parapertussis (KC5526) Not Detected     Bordetella pertussis (ptxP) Not Detected     Chlamydia pneumoniae Not Detected     Mycoplasma pneumoniae Not Detected     Comment: Respiratory Infection Panel testing performed by Multiplex PCR.       Narrative:      Assay not valid for lower respiratory specimens, alternate  testing required.    Blood culture x two cultures. Draw prior to antibiotics. [[id number]] Collected: 01/24/25 1250    Order Status: Completed Specimen: Blood from Peripheral, Forearm, Left Updated:  01/27/25 0612     Blood Culture, Routine No Growth to date      No Growth to date      No Growth to date    Narrative:      Aerobic and anaerobic    Blood culture x two cultures. Draw prior to antibiotics. [[id number]] Collected: 01/24/25 1256    Order Status: Completed Specimen: Blood from Peripheral, Antecubital, Right Updated: 01/27/25 0612     Blood Culture, Routine No Growth to date      No Growth to date      No Growth to date    Narrative:      Aerobic and anaerobic        Continue current treatment as still requiring 4L nasal cannula   Add incentive spirometer   Physical/occupational therapy recommending homehealth physical/occupational therapy     Thrombocytopenia  The likely etiology of thrombocytopenia is medication induced, the suspected medication(s) is/are plavix . The patients 3 most recent labs are listed below.  Recent Labs     01/25/25 0426   *       Plan  - Will transfuse if platelet count is <50k (if undergoing surgical procedure or have active bleeding).  - monitor daily      Rhinovirus infection        Acute hypoxemic respiratory failure  Patient with Hypoxic Respiratory failure which is Acute.  she is not on home oxygen. Supplemental oxygen was provided and noted- Oxygen Concentration (%):  [36] 36    .   Signs/symptoms of respiratory failure include- tachypnea, increased work of breathing, and respiratory distress. Contributing diagnoses includes - Pneumonia Labs and images were reviewed. Patient Has not had a recent ABG. Will treat underlying causes and adjust management of respiratory failure as follows- continue current regimen    Wean as able  Add incentive spirometer     Hypokalemia  Patient's most recent potassium results are listed below.   Recent Labs     01/24/25  1258 01/25/25  0426   K 2.8* 3.3*     Plan  - Replete potassium per protocol  - Monitor potassium Daily  - Patient's hypokalemia is improving  - mg 1.8    Prolonged Q-T interval on ECG  Discontinue azithromycin  Add  doxycycline     History of CVA (cerebrovascular accident)  -continue ASA, plavix, and lipitor  Thrombocytopenia noted   Continue plavix  Discontinue lovenox      Hyperglycemia  Patient's FSGs are controlled on current medication regimen.  Last A1c reviewed-   Lab Results   Component Value Date    HGBA1C 5.0 01/25/2025     Most recent fingerstick glucose reviewed-   Recent Labs   Lab 01/26/25  1648   POCTGLUCOSE 163*       Current correctional scale  Low  Maintain anti-hyperglycemic dose as follows-   Antihyperglycemics (From admission, onward)      None        No History of diabetes.  Plan:  -SSI  -A1c  -Accu-checks  -Hypoglycemic protocol      On systemic steroids with poor po intake      Other specified hypothyroidism  Patient has chronic hypothyroidism. TFTs reviewed-   Lab Results   Component Value Date    TSH 6.52 (H) 03/20/2024   . Will continue chronic levothyroxine and adjust for and clinical changes.        Essential hypertension  Chronic, controlled.  Latest blood pressure and vitals reviewed-   Temp:  [97.9 °F (36.6 °C)-98.7 °F (37.1 °C)]   Pulse:  [82-95]   Resp:  [16-18]   BP: (112-140)/(53-70)   SpO2:  [88 %-94 %] .   Home meds for hypertension were reviewed and noted below.   Hypertension Medications               metoprolol succinate (TOPROL-XL) 50 MG 24 hr tablet Take 1 tablet by mouth once daily.            While in the hospital, will manage blood pressure as follows; Continue home antihypertensive regimen    Will utilize p.r.n. blood pressure medication only if patient's blood pressure greater than  180/110 and she develops symptoms such as worsening chest pain or shortness of breath.      GERD (gastroesophageal reflux disease)  Chronic. Stable. Currently asymptomatic. Home medications include PPI/Antacids as needed.  Plan:  -Continue PPI/Antacids as needed         Anxiety and depression  Chronic. Stable. Not in acute exacerbation and currently denies endorsing any suicidal/homicidal ideations.    Plan:  -Continue home medications (xanax and zoloft)        VTE Risk Mitigation (From admission, onward)           Ordered     IP VTE HIGH RISK PATIENT  Once         01/24/25 2354     Place sequential compression device  Until discontinued         01/24/25 2354                    Discharge Planning   HERNANDO:      Code Status: Full Code   Medical Readiness for Discharge Date:   Discharge Plan A: Home, Home with family                    Farhat Mason MD  Department of Hospital Medicine   [facility] - Telemetry ([facility])

## 2025-01-27 NOTE — ASSESSMENT & PLAN NOTE
- Patient with Hypoxic Respiratory failure which is Acute. She is not on home oxygen. Supplemental oxygen was provided and noted- Oxygen Concentration (%):  [36] 36  - Signs/symptoms of respiratory failure include- increased work of breathing and respiratory distress. Contributing diagnoses includes - Pneumonia Labs and images were reviewed. Patient Has not had a recent ABG. Will treat underlying causes and adjust management of respiratory failure as follows-   - Currently on 4 liters/minute oxygen, continue to wean as able  - Continue scheduled nebs  - Empiric CAP coverage, sputum cx if able  - Monitor for worsening fevers, WBC; CXR if worsening respiratory status  - + SASKIA screen, elevated CRP/ESR; complements pending

## 2025-01-27 NOTE — PT/OT/SLP EVAL
Occupational Therapy   Evaluation    Name: Linda Segura  MRN: [id number]  Admitting Diagnosis: Rhinovirus infection  Recent Surgery: * No surgery found *      Recommendations:     Discharge Recommendations: Low Intensity Therapy  Discharge Equipment Recommendations:  none  Barriers to discharge:  None    Assessment:     Linda Segura is a 74 y.o. female with a medical diagnosis of Rhinovirus infection.  She presents with the following performance deficits affecting function: weakness, impaired endurance, impaired self care skills, gait instability, impaired balance, impaired cardiopulmonary response to activity.      Rehab Prognosis: Good; patient would benefit from acute skilled OT services to address these deficits and reach maximum level of function.       Plan:     Patient to be seen 2 x/week to address the above listed problems via self-care/home management, therapeutic activities, therapeutic exercises  Plan of Care Expires: 02/10/25  Plan of Care Reviewed with: patient    Subjective     Chief Complaint: Patient eating breakfast and nurse providing medications.   Patient/Family Comments/goals: To return home.     Occupational Profile:  Living Environment: Lives with adult son in single story home, no steps to enter.   Previous level of function: Ind with rollator.   Roles and Routines: not driving or working  Equipment Used at Home: wheelchair, walker, rolling, shower chair, rollator, bedside commode, grab bar  Assistance upon Discharge: family    Pain/Comfort:  Pain Rating 1: 0/10    Patients cultural, spiritual, Islam conflicts given the current situation: no    Objective:     Communicated with: nurse prior to session.  Patient found up in chair with peripheral IV, telemetry, chair check, oxygen upon OT entry to room.    General Precautions: Standard, fall, droplet  Orthopedic Precautions: N/A  Braces: N/A  Respiratory Status: Nasal cannula, flow 4 L/min    Occupational Performance:    Bed  Mobility:    Not tested    Functional Mobility/Transfers:  Patient declined mobility at this time d/t eating breakfast. Per chart review with PT (CGA with RW)    Activities of Daily Living:  Lower Body Dressing: supervision to don/doff socks    Cognitive/Visual Perceptual:  Cognitive/Psychosocial Skills:     -       Oriented to: Person, Place, Time, and Situation   -       Follows Commands/attention:Follows two-step commands  -       Safety awareness/insight to disability: intact     Physical Exam:  Upper Extremity Range of Motion:     -       Right Upper Extremity: WNL  -       Left Upper Extremity: WNL  Upper Extremity Strength:    -       Right Upper Extremity: WNL  -       Left Upper Extremity: WNL   Strength:    -       Right Upper Extremity: WNL  -       Left Upper Extremity: WNL  Fine Motor Coordination:    -       Intact  Gross motor coordination:   WFL    AMPAC 6 Click ADL:  AMPAC Total Score: 18    Treatment & Education:  Patient educated on role of OT in acute care and POC.     Patient left up in chair with all lines intact, call button in reach, chair alarm on, and nurse present    GOALS:   Multidisciplinary Problems       Occupational Therapy Goals          Problem: Occupational Therapy    Goal Priority Disciplines Outcome Interventions   Occupational Therapy Goal     OT, PT/OT     Description: Goals to be met by: 2/10/25     Patient will increase functional independence with ADLs by performing:    Toileting from toilet with Jay for hygiene and clothing management.   Toilet transfer to toilet with Jay.                         History:     Past Medical History:   Diagnosis Date    Abnormal Pap smear of vagina     Anxiety     Anxiety and depression     Cancer     tongue    CVA (cerebral vascular accident)     Essential hypertension 6/12/2018    Mental disorder     Other specified hypothyroidism 2/13/2021    Scoliosis          Past Surgical History:   Procedure Laterality Date     BLADDER SURGERY      CERVICAL SPINE SURGERY      colpocleisis  04/2018    Dr. Strong    feet Bilateral     HYSTERECTOMY      severe endometriosis    KNEE SURGERY      OOPHORECTOMY Bilateral     severe endometriosis       Time Tracking:     OT Date of Treatment: 01/27/25  OT Start Time: 0815  OT Stop Time: 0833  OT Total Time (min): 18 min    Billable Minutes:Evaluation 18    1/27/2025

## 2025-01-27 NOTE — PT/OT/SLP EVAL
Physical Therapy Evaluation    Patient Name:  Linda Segura   MRN:  [id number]    Recommendations:     Discharge Recommendations: Low Intensity Therapy   Discharge Equipment Recommendations: none   Barriers to discharge: None    Assessment:     Linda Segura is a 74 y.o. female admitted with a medical diagnosis of Rhinovirus infection.  She presents with the following impairments/functional limitations: weakness, impaired endurance, impaired self care skills, impaired functional mobility, gait instability, impaired balance, decreased ROM, decreased safety awareness, decreased lower extremity function .    Rehab Prognosis: Good; patient would benefit from acute skilled PT services to address these deficits and reach maximum level of function.    Recent Surgery: * No surgery found *      Plan:     During this hospitalization, patient to be seen 3 x/week to address the identified rehab impairments via gait training, therapeutic activities, therapeutic exercises and progress toward the following goals:    Plan of Care Expires:  02/10/25    Subjective     Chief Complaint: WETNESS  Patient/Family Comments/goals: GO HOME   Pain/Comfort:  Pain Rating 1: 0/10  Pain Rating Post-Intervention 1: 0/10    Patients cultural, spiritual, Religion conflicts given the current situation:      Living Environment:   PT LIVES AT HOME WITH SON IN A ONE STORY HOME WITH NO STEPS TO ENTER   Prior to admission, patients level of function was MOD I WITH ROLLATOR AND DOESN'T DRIVE . PT SON IS HOME TO ASSIST HER DAILY. .  Equipment used at home: rollator, wheelchair, grab bar, bedside commode, shower chair.  DME owned (not currently used): none.  Upon discharge, patient will have assistance from SON .    Objective:     Communicated with NURSE AND EPIC CHART REVIEW  prior to session.  Patient found supine with peripheral IV, oxygen, telemetry  upon PT entry to room.    General Precautions: Standard, fall  Orthopedic Precautions:N/A  "  Braces: N/A  Respiratory Status: Nasal cannula, flow 4 L/min    Exams:  Postural Exam:  Patient presented with the following abnormalities:    -       Rounded shoulders  RLE ROM: WFL  RLE Strength: WFL  LLE ROM: WFL  LLE Strength: WFL    Functional Mobility:  Bed Mobility:     Rolling Left:  supervision  Scooting: stand by assistance  Supine to Sit: stand by assistance  Transfers:     Sit to Stand:  contact guard assistance with rolling walker  Bed to Chair: contact guard assistance with  rolling walker  using  Stand Pivot  Gait: PT GT TRAINED 1X8' AND 1X20' WITH RW AND CGA WITH O2 IN TOW      AM-PAC 6 CLICK MOBILITY  Total Score:19       Treatment & Education:  PT MET IN  PT SOILED AND P.T. CALLED FOR CLEANING. PT PUREWICK NOT WORKING. P.T. ASSISTED PT TO BATHROOM WITH CGA AND RW AND ATTACHED AND EXTENDER TO THE O2 TO REACH BATHROOM. PT NEEDED MIN A FOR CLEANING. PT RETURNED TO  T/F TO CHAIR FOR OOB TOLERANCE. PCT PRESENT TO ASSIST IN CLEANING LINENS. PT EDUCATED ON ROLE OF P.T. AND PT EDUCATED ON "CALL DON'T FALL", ENCOURAGED TO CALL FOR ASSISTANCE WITH ALL NEEDS FOR OOB MOBILITY.      Patient left up in chair with call button in reach.    GOALS:   Multidisciplinary Problems       Physical Therapy Goals          Problem: Physical Therapy    Goal Priority Disciplines Outcome Interventions   Physical Therapy Goal     PT, PT/OT     Description: LT/10/25  1. PT WILL COMPLETE BED MOBILITY IND  2. PT WILL STAND T/F TO CHAIR WITH RW MOD I  3. PT WILL GT TRAIN X 150' WITH RW MOD I TO PROGRESS GT.  4. PT WILL INC AMPAC SCORE BY 2 POINTS TO PROGRESS GROSS FUNC MOBILITY.                          DME Justifications:  No DME recommended requiring DME justifications    History:     Past Medical History:   Diagnosis Date    Abnormal Pap smear of vagina     Anxiety     Anxiety and depression     Cancer     tongue    CVA (cerebral vascular accident)     Essential hypertension 2018    Mental disorder     Other " specified hypothyroidism 2/13/2021    Scoliosis        Past Surgical History:   Procedure Laterality Date    BLADDER SURGERY      CERVICAL SPINE SURGERY      colpocleisis  04/2018    Dr. Strong    feet Bilateral     HYSTERECTOMY      severe endometriosis    KNEE SURGERY      OOPHORECTOMY Bilateral     severe endometriosis       Time Tracking:     PT Received On: 01/27/25  PT Start Time: 0720     PT Stop Time: 0745  PT Total Time (min): 25 min     Billable Minutes: Evaluation 15 and Gait Training 10      01/27/2025

## 2025-01-27 NOTE — ASSESSMENT & PLAN NOTE
Patient's FSGs are controlled on current medication regimen.  Last A1c reviewed-   Lab Results   Component Value Date    HGBA1C 5.0 01/25/2025     Most recent fingerstick glucose reviewed-   Recent Labs   Lab 01/26/25  1648   POCTGLUCOSE 163*       Current correctional scale  Low  Maintain anti-hyperglycemic dose as follows-   Antihyperglycemics (From admission, onward)    None      No History of diabetes.  Plan:  -SSI  -A1c  -Accu-checks  -Hypoglycemic protocol      On systemic steroids with poor po intake

## 2025-01-28 LAB
1,3 BETA GLUCAN SER-MCNC: <31 PG/ML
ALLENS TEST: ABNORMAL
C3 SERPL-MCNC: 129 MG/DL (ref 50–180)
C4 SERPL-MCNC: 30 MG/DL (ref 11–44)
DELSYS: ABNORMAL
ERYTHROCYTE [DISTWIDTH] IN BLOOD BY AUTOMATED COUNT: 13.5 % (ref 11.5–14.5)
FUNGITELL COMMENTS: NEGATIVE
HCO3 UR-SCNC: 23.3 MMOL/L (ref 24–28)
HCT VFR BLD AUTO: 42.5 % (ref 37–48.5)
HGB BLD-MCNC: 14.1 G/DL (ref 12–16)
M PNEUMO IGG SER IA-ACNC: POSITIVE
M PNEUMO IGM SER IA-ACNC: NEGATIVE
MCH RBC QN AUTO: 29.2 PG (ref 27–31)
MCHC RBC AUTO-ENTMCNC: 33.2 G/DL (ref 32–36)
MCV RBC AUTO: 88 FL (ref 82–98)
MYCOPLASMA PNEUMONIAE AB INTERPRETATION: ABNORMAL
PCO2 BLDA: 31.8 MMHG (ref 35–45)
PH SMN: 7.47 [PH] (ref 7.35–7.45)
PLATELET # BLD AUTO: 167 K/UL (ref 150–450)
PLATELET BLD QL SMEAR: NORMAL
PMV BLD AUTO: 10.5 FL (ref 9.2–12.9)
PO2 BLDA: 41 MMHG (ref 80–100)
POC BE: 0 MMOL/L
POC SATURATED O2: 80 % (ref 95–100)
RBC # BLD AUTO: 4.83 M/UL (ref 4–5.4)
SAMPLE: ABNORMAL
SITE: ABNORMAL
WBC # BLD AUTO: 13.41 K/UL (ref 3.9–12.7)

## 2025-01-28 PROCEDURE — 94640 AIRWAY INHALATION TREATMENT: CPT

## 2025-01-28 PROCEDURE — 25000242 PHARM REV CODE 250 ALT 637 W/ HCPCS: Performed by: FAMILY MEDICINE

## 2025-01-28 PROCEDURE — 99900035 HC TECH TIME PER 15 MIN (STAT)

## 2025-01-28 PROCEDURE — 36415 COLL VENOUS BLD VENIPUNCTURE: CPT | Performed by: FAMILY MEDICINE

## 2025-01-28 PROCEDURE — 63600175 PHARM REV CODE 636 W HCPCS: Performed by: FAMILY MEDICINE

## 2025-01-28 PROCEDURE — 25000003 PHARM REV CODE 250: Performed by: NURSE PRACTITIONER

## 2025-01-28 PROCEDURE — 94799 UNLISTED PULMONARY SVC/PX: CPT

## 2025-01-28 PROCEDURE — 63600175 PHARM REV CODE 636 W HCPCS: Performed by: NURSE PRACTITIONER

## 2025-01-28 PROCEDURE — 36600 WITHDRAWAL OF ARTERIAL BLOOD: CPT

## 2025-01-28 PROCEDURE — 27000221 HC OXYGEN, UP TO 24 HOURS

## 2025-01-28 PROCEDURE — 94761 N-INVAS EAR/PLS OXIMETRY MLT: CPT

## 2025-01-28 PROCEDURE — 27000249 HC VAPOTHERM CIRCUIT

## 2025-01-28 PROCEDURE — 21400001 HC TELEMETRY ROOM

## 2025-01-28 PROCEDURE — 25000003 PHARM REV CODE 250: Performed by: FAMILY MEDICINE

## 2025-01-28 PROCEDURE — 27000207 HC ISOLATION

## 2025-01-28 PROCEDURE — 94664 DEMO&/EVAL PT USE INHALER: CPT

## 2025-01-28 PROCEDURE — 5A0945A ASSISTANCE WITH RESPIRATORY VENTILATION, 24-96 CONSECUTIVE HOURS, HIGH NASAL FLOW/VELOCITY: ICD-10-PCS | Performed by: FAMILY MEDICINE

## 2025-01-28 PROCEDURE — 85027 COMPLETE CBC AUTOMATED: CPT | Performed by: FAMILY MEDICINE

## 2025-01-28 PROCEDURE — 27100171 HC OXYGEN HIGH FLOW UP TO 24 HOURS

## 2025-01-28 RX ORDER — BUDESONIDE 0.5 MG/2ML
0.5 INHALANT ORAL EVERY 12 HOURS
Status: DISCONTINUED | OUTPATIENT
Start: 2025-01-28 | End: 2025-02-01 | Stop reason: HOSPADM

## 2025-01-28 RX ADMIN — POTASSIUM CHLORIDE 40 MEQ: 1500 TABLET, EXTENDED RELEASE ORAL at 09:01

## 2025-01-28 RX ADMIN — SERTRALINE HYDROCHLORIDE 100 MG: 50 TABLET ORAL at 09:01

## 2025-01-28 RX ADMIN — ATORVASTATIN CALCIUM 10 MG: 10 TABLET, FILM COATED ORAL at 09:01

## 2025-01-28 RX ADMIN — ALBUTEROL SULFATE 2.5 MG: 0.83 SOLUTION RESPIRATORY (INHALATION) at 07:01

## 2025-01-28 RX ADMIN — BUDESONIDE INHALATION 0.5 MG: 0.5 SUSPENSION RESPIRATORY (INHALATION) at 09:01

## 2025-01-28 RX ADMIN — FUROSEMIDE 20 MG: 10 INJECTION, SOLUTION INTRAMUSCULAR; INTRAVENOUS at 09:01

## 2025-01-28 RX ADMIN — Medication 400 ML: at 01:01

## 2025-01-28 RX ADMIN — METHYLPREDNISOLONE SODIUM SUCCINATE 40 MG: 40 INJECTION, POWDER, FOR SOLUTION INTRAMUSCULAR; INTRAVENOUS at 09:01

## 2025-01-28 RX ADMIN — Medication 400 ML: at 06:01

## 2025-01-28 RX ADMIN — ALBUTEROL SULFATE 2.5 MG: 0.83 SOLUTION RESPIRATORY (INHALATION) at 01:01

## 2025-01-28 RX ADMIN — CLOPIDOGREL BISULFATE 75 MG: 75 TABLET ORAL at 06:01

## 2025-01-28 RX ADMIN — SERTRALINE HYDROCHLORIDE 100 MG: 50 TABLET ORAL at 10:01

## 2025-01-28 RX ADMIN — Medication 400 ML: at 10:01

## 2025-01-28 RX ADMIN — DOXYCYCLINE HYCLATE 100 MG: 100 TABLET, COATED ORAL at 09:01

## 2025-01-28 RX ADMIN — Medication 400 ML: at 09:01

## 2025-01-28 RX ADMIN — METOPROLOL SUCCINATE 50 MG: 50 TABLET, EXTENDED RELEASE ORAL at 09:01

## 2025-01-28 RX ADMIN — PANTOPRAZOLE SODIUM 40 MG: 40 TABLET, DELAYED RELEASE ORAL at 09:01

## 2025-01-28 RX ADMIN — GUAIFENESIN 600 MG: 600 TABLET, EXTENDED RELEASE ORAL at 09:01

## 2025-01-28 RX ADMIN — ASPIRIN 81 MG: 81 TABLET, COATED ORAL at 10:01

## 2025-01-28 RX ADMIN — BUDESONIDE INHALATION 0.5 MG: 0.5 SUSPENSION RESPIRATORY (INHALATION) at 07:01

## 2025-01-28 RX ADMIN — CEFTRIAXONE 1 G: 1 INJECTION, POWDER, FOR SOLUTION INTRAMUSCULAR; INTRAVENOUS at 01:01

## 2025-01-28 RX ADMIN — LEVOTHYROXINE SODIUM 50 MCG: 0.05 TABLET ORAL at 06:01

## 2025-01-28 NOTE — NURSING
Pt on purewick due to decline in repiratory status, pt unable to tolerate ambulating to bathroom/bedside commode. Purewick in place upon first shift rounds, pt's sheets and pad dry. Upon entering room to obtain 1200 vital signs, pt's sheets and purewick were saturated with urine. Dirty sheets and purewick removed, bed and patient cleaned, clean sheets put on bed and purewick replaced.

## 2025-01-28 NOTE — ASSESSMENT & PLAN NOTE
Patient has a diagnosis of pneumonia. The cause of the pneumonia is viral in etiology due to  rhinovirus . The pneumonia is improving. The patient has the following signs/symptoms of pneumonia: cough, sputum production, and shortness of breath. The patient does have a current oxygen requirement and the patient does not have a home oxygen requirement. I have reviewed the pertinent imaging. The following cultures have been collected: Blood cultures The culture results are listed below.     Current antimicrobial regimen consists of the antibiotics listed below. Will monitor patient closely and continue current treatment plan unchanged.    Antibiotics (From admission, onward)      Start     Stop Route Frequency Ordered    01/25/25 2100  doxycycline tablet 100 mg         -- Oral Every 12 hours 01/25/25 1809    01/25/25 1400  cefTRIAXone injection 1 g         -- IV Every 24 hours (non-standard times) 01/24/25 2351            Microbiology Results (last 7 days)       Procedure Component Value Units Date/Time    Culture, Respiratory with Gram Stain [[id number]] Collected: 01/27/25 1919    Order Status: Completed Specimen: Respiratory from Sputum Updated: 01/28/25 0629     Gram Stain (Respiratory) <10 epithelial cells per low power field.     Gram Stain (Respiratory) Rare WBC's     Gram Stain (Respiratory) Rare yeast    Blood culture x two cultures. Draw prior to antibiotics. [[id number]] Collected: 01/24/25 1250    Order Status: Completed Specimen: Blood from Peripheral, Forearm, Left Updated: 01/28/25 0612     Blood Culture, Routine No Growth to date      No Growth to date      No Growth to date      No Growth to date    Narrative:      Aerobic and anaerobic    Blood culture x two cultures. Draw prior to antibiotics. [[id number]] Collected: 01/24/25 1256    Order Status: Completed Specimen: Blood from Peripheral, Antecubital, Right Updated: 01/28/25 0612     Blood Culture, Routine No Growth to date      No Growth to date       No Growth to date      No Growth to date    Narrative:      Aerobic and anaerobic    Respiratory Infection Panel (PCR), Nasopharyngeal [[id number]]  (Abnormal) Collected: 01/25/25 1406    Order Status: Completed Specimen: Nasopharyngeal Swab Updated: 01/27/25 1211     Respiratory Infection Panel Source NP Swab     Adenovirus Not Detected     Coronavirus 229E, Common Cold Virus Not Detected     Coronavirus HKU1, Common Cold Virus Not Detected     Coronavirus NL63, Common Cold Virus Not Detected     Coronavirus OC43, Common Cold Virus Not Detected     Comment: The Coronavirus strains detected in this test cause the common cold.  These strains are not the COVID-19 (novel Coronavirus)strain   associated with the respiratory disease outbreak.          SARS-CoV2 (COVID-19) Qualitative PCR Not Detected     Human Metapneumovirus Not Detected     Human Rhinovirus/Enterovirus Detected     Influenza A (subtypes H1, H1-2009,H3) Not Detected     Influenza B Not Detected     Parainfluenza Virus 1 Not Detected     Parainfluenza Virus 2 Not Detected     Parainfluenza Virus 3 Not Detected     Parainfluenza Virus 4 Not Detected     Respiratory Syncytial Virus Not Detected     Bordetella Parapertussis (JJ7981) Not Detected     Bordetella pertussis (ptxP) Not Detected     Chlamydia pneumoniae Not Detected     Mycoplasma pneumoniae Not Detected     Comment: Respiratory Infection Panel testing performed by Multiplex PCR.       Narrative:      Assay not valid for lower respiratory specimens, alternate  testing required.        Worsening   Abg reviewed   Blood culture negative growth to date x 4 days  On vapotherm   Add pulmicort and systemic steroids continued   Has incentive spirometer and aerobika   Physical/occupational therapy recommending homehealth physical/occupational therapy

## 2025-01-28 NOTE — NURSING
Care of patient transferred from NUBIA Webb to NUBIA Galvan. Report given to NUBIA Galvan. Mandy introduced to patient at bedside.

## 2025-01-28 NOTE — PLAN OF CARE
Problem: Adult Inpatient Plan of Care  Goal: Plan of Care Review  Outcome: Progressing  Goal: Patient-Specific Goal (Individualized)  Outcome: Progressing  Goal: Absence of Hospital-Acquired Illness or Injury  Outcome: Progressing  Goal: Optimal Comfort and Wellbeing  Outcome: Progressing  Goal: Readiness for Transition of Care  Outcome: Progressing     Problem: Pneumonia  Goal: Fluid Balance  Outcome: Progressing  Goal: Resolution of Infection Signs and Symptoms  Outcome: Progressing  Goal: Effective Oxygenation and Ventilation  Outcome: Progressing     Problem: Skin Injury Risk Increased  Goal: Skin Health and Integrity  Outcome: Progressing     Problem: Fall Injury Risk  Goal: Absence of Fall and Fall-Related Injury  Outcome: Progressing     Problem: Fatigue  Goal: Improved Activity Tolerance  Outcome: Progressing     Problem: Infection  Goal: Absence of Infection Signs and Symptoms  Outcome: Progressing     Problem: Airway Clearance Ineffective  Goal: Effective Airway Clearance  Outcome: Progressing

## 2025-01-28 NOTE — NURSING
Patient already on purewick at beginning of shift. They remain on purewick due to them not being able to ambulate or be repositioned without their O2 sat dropping. Patient's pad and sheets remain dry.

## 2025-01-28 NOTE — NURSING
Lexie Tracey found patient's O2 sat to be sustaining in the upper 70s-80s. Respiratory and MD notified and came to bedside. ABG obtained by respiratory and nebulizer administered by respiratory. Patient placed on vapotherm by respiratory.

## 2025-01-28 NOTE — PROGRESS NOTES
[facility] - Telemetry ([facility])  Pulmonology  Progress Note    Patient Name: Linda Segura  MRN: [id number]  Admission Date: 1/24/2025  Hospital Length of Stay: 4 days  Code Status: Full Code  Attending Provider: Farhat Mason MD  Primary Care Provider: Rolly Hammond MD   Principal Problem: Multifocal pneumonia    Subjective:     Interval History: worsening hypoxemia       Objective:     Vital Signs (Most Recent):  Temp: 98.4 °F (36.9 °C) (01/28/25 1153)  Pulse: 84 (01/28/25 1530)  Resp: 20 (01/28/25 1530)  BP: (!) 118/59 (01/28/25 1153)  SpO2: 95 % (01/28/25 1530) Vital Signs (24h Range):  Temp:  [97.6 °F (36.4 °C)-98.9 °F (37.2 °C)] 98.4 °F (36.9 °C)  Pulse:  [] 84  Resp:  [16-22] 20  SpO2:  [77 %-100 %] 95 %  BP: (118-191)/(59-83) 118/59     Weight: 74.4 kg (164 lb 0.4 oz)  Body mass index is 26.47 kg/m².      Intake/Output Summary (Last 24 hours) at 1/28/2025 1629  Last data filed at 1/28/2025 1400  Gross per 24 hour   Intake 2200 ml   Output 1700 ml   Net 500 ml        Physical Exam  Vitals and nursing note reviewed.   Constitutional:       General: She is not in acute distress.     Appearance: She is ill-appearing.   HENT:      Head: Normocephalic and atraumatic.   Pulmonary:      Breath sounds: Rhonchi and rales present.   Abdominal:      General: There is no distension.      Palpations: Abdomen is soft.   Musculoskeletal:         General: No swelling or tenderness.   Neurological:      Mental Status: She is alert.           Review of Systems   Respiratory:  Positive for cough. Negative for wheezing.    Cardiovascular:  Negative for leg swelling.       Vents:  Oxygen Concentration (%): (S) 80 (01/28/25 1530)    Lines/Drains/Airways       Drain  Duration             Female External Urinary Catheter w/ Suction 01/28/25 0700 <1 day              Peripheral Intravenous Line  Duration                  Peripheral IV - Single Lumen 01/25/25 2012 20 G Posterior;Right Hand 2 days               "      Significant Labs:    CBC/Anemia Profile:  Recent Labs   Lab 01/28/25  0435   WBC 13.41*   HGB 14.1   HCT 42.5      MCV 88   RDW 13.5        Chemistries:  Recent Labs   Lab 01/27/25  0539      K 3.5      CO2 24   BUN 16   CREATININE 0.6   CALCIUM 9.6       All pertinent labs within the past 24 hours have been reviewed.    Significant Imaging:  I have reviewed all pertinent imaging results/findings within the past 24 hours.    ABG  Recent Labs   Lab 01/28/25  0911   PH 7.472*   PO2 41*   PCO2 31.8*   HCO3 23.3*   BE 0     Assessment/Plan:     Pulmonary  * Multifocal pneumonia  - Patient has a diagnosis of pneumonia. The cause of the pneumonia is suspected to be bacterial in etiology but organism is not known. The pneumonia is stable. The patient has the following signs/symptoms of pneumonia: persistent hypoxia  and cough. The patient does have a current oxygen requirement and the patient does have a home oxygen requirement. I have reviewed the pertinent imaging. The following cultures have been collected: Blood cultures and Sputum culture The culture results are listed below.   - Current antimicrobial regimen consists of the antibiotics listed below. Will monitor patient closely and continue current treatment plan unchanged.  - Now with productive cough; "thick, brown" sputum - collect sputum cx  - Continue rocephin + doxy  - Mycoplasm, legionella, fungitell pending    Antibiotics (From admission, onward)      Start     Stop Route Frequency Ordered    01/25/25 2100  doxycycline tablet 100 mg         -- Oral Every 12 hours 01/25/25 1809    01/25/25 1400  cefTRIAXone injection 1 g         -- IV Every 24 hours (non-standard times) 01/24/25 2351            Microbiology Results (last 7 days)       Procedure Component Value Units Date/Time    Culture, Respiratory with Gram Stain [[id number]] Collected: 01/27/25 1919    Order Status: Completed Specimen: Respiratory from Sputum Updated: 01/28/25 0629 "     Gram Stain (Respiratory) <10 epithelial cells per low power field.     Gram Stain (Respiratory) Rare WBC's     Gram Stain (Respiratory) Rare yeast    Blood culture x two cultures. Draw prior to antibiotics. [[id number]] Collected: 01/24/25 1250    Order Status: Completed Specimen: Blood from Peripheral, Forearm, Left Updated: 01/28/25 0612     Blood Culture, Routine No Growth to date      No Growth to date      No Growth to date      No Growth to date    Narrative:      Aerobic and anaerobic    Blood culture x two cultures. Draw prior to antibiotics. [[id number]] Collected: 01/24/25 1256    Order Status: Completed Specimen: Blood from Peripheral, Antecubital, Right Updated: 01/28/25 0612     Blood Culture, Routine No Growth to date      No Growth to date      No Growth to date      No Growth to date    Narrative:      Aerobic and anaerobic    Respiratory Infection Panel (PCR), Nasopharyngeal [[id number]]  (Abnormal) Collected: 01/25/25 1406    Order Status: Completed Specimen: Nasopharyngeal Swab Updated: 01/27/25 1211     Respiratory Infection Panel Source NP Swab     Adenovirus Not Detected     Coronavirus 229E, Common Cold Virus Not Detected     Coronavirus HKU1, Common Cold Virus Not Detected     Coronavirus NL63, Common Cold Virus Not Detected     Coronavirus OC43, Common Cold Virus Not Detected     Comment: The Coronavirus strains detected in this test cause the common cold.  These strains are not the COVID-19 (novel Coronavirus)strain   associated with the respiratory disease outbreak.          SARS-CoV2 (COVID-19) Qualitative PCR Not Detected     Human Metapneumovirus Not Detected     Human Rhinovirus/Enterovirus Detected     Influenza A (subtypes H1, H1-2009,H3) Not Detected     Influenza B Not Detected     Parainfluenza Virus 1 Not Detected     Parainfluenza Virus 2 Not Detected     Parainfluenza Virus 3 Not Detected     Parainfluenza Virus 4 Not Detected     Respiratory Syncytial Virus Not Detected      Bordetella Parapertussis (WF5874) Not Detected     Bordetella pertussis (ptxP) Not Detected     Chlamydia pneumoniae Not Detected     Mycoplasma pneumoniae Not Detected     Comment: Respiratory Infection Panel testing performed by Multiplex PCR.       Narrative:      Assay not valid for lower respiratory specimens, alternate  testing required.            1/28 patient with multifocal pneumonia.  Sputum culture is pending.  She is currently on antibiotics.  Not sure this is all related to rhinovirus.  She was found to have a positive SASKIA as well.  She denies any exposures.  Patient has a history of tongue cancer in 2007 and did get radiation.  States some trouble with swallowing and choking.  She was placed on Vapotherm this morning.  Upon arrival this was off her face so replaced it and oxygenation improved.  She is currently on FiO2 of 80 and a flow of 25.  She was started on steroids.  Did discuss with medicine and consider rheumatology consult as well.  Have ordered speech consult.  Continue to follow up sputum cultures.  Can consider dose of Lasix if needed.f/u pending studies     Acute hypoxemic respiratory failure  - Patient with Hypoxic Respiratory failure which is Acute. She is not on home oxygen. Supplemental oxygen was provided and noted- Oxygen Concentration (%):  [36-90] 80  - Signs/symptoms of respiratory failure include- increased work of breathing and respiratory distress. Contributing diagnoses includes - Pneumonia Labs and images were reviewed. Patient Has not had a recent ABG. Will treat underlying causes and adjust management of respiratory failure as follows-   - Currently on 4 liters/minute oxygen, continue to wean as able  - Continue scheduled nebs  - Empiric CAP coverage, sputum cx if able  - Monitor for worsening fevers, WBC; CXR if worsening respiratory status  - + SASKIA screen, elevated CRP/ESR; complements pending      1/28 see pneumonia            Bea Smith MD  Pulmonology  [facility]  - Telemetry ([facility])

## 2025-01-28 NOTE — SUBJECTIVE & OBJECTIVE
Interval History: worsening hypoxemia       Objective:     Vital Signs (Most Recent):  Temp: 98.4 °F (36.9 °C) (01/28/25 1153)  Pulse: 84 (01/28/25 1530)  Resp: 20 (01/28/25 1530)  BP: (!) 118/59 (01/28/25 1153)  SpO2: 95 % (01/28/25 1530) Vital Signs (24h Range):  Temp:  [97.6 °F (36.4 °C)-98.9 °F (37.2 °C)] 98.4 °F (36.9 °C)  Pulse:  [] 84  Resp:  [16-22] 20  SpO2:  [77 %-100 %] 95 %  BP: (118-191)/(59-83) 118/59     Weight: 74.4 kg (164 lb 0.4 oz)  Body mass index is 26.47 kg/m².      Intake/Output Summary (Last 24 hours) at 1/28/2025 1629  Last data filed at 1/28/2025 1400  Gross per 24 hour   Intake 2200 ml   Output 1700 ml   Net 500 ml        Physical Exam  Vitals and nursing note reviewed.   Constitutional:       General: She is not in acute distress.     Appearance: She is ill-appearing.   HENT:      Head: Normocephalic and atraumatic.   Pulmonary:      Breath sounds: Rhonchi and rales present.   Abdominal:      General: There is no distension.      Palpations: Abdomen is soft.   Musculoskeletal:         General: No swelling or tenderness.   Neurological:      Mental Status: She is alert.           Review of Systems   Respiratory:  Positive for cough. Negative for wheezing.    Cardiovascular:  Negative for leg swelling.       Vents:  Oxygen Concentration (%): (S) 80 (01/28/25 1530)    Lines/Drains/Airways       Drain  Duration             Female External Urinary Catheter w/ Suction 01/28/25 0700 <1 day              Peripheral Intravenous Line  Duration                  Peripheral IV - Single Lumen 01/25/25 2012 20 G Posterior;Right Hand 2 days                    Significant Labs:    CBC/Anemia Profile:  Recent Labs   Lab 01/28/25  0435   WBC 13.41*   HGB 14.1   HCT 42.5      MCV 88   RDW 13.5        Chemistries:  Recent Labs   Lab 01/27/25  0539      K 3.5      CO2 24   BUN 16   CREATININE 0.6   CALCIUM 9.6       All pertinent labs within the past 24 hours have been  reviewed.    Significant Imaging:  I have reviewed all pertinent imaging results/findings within the past 24 hours.

## 2025-01-28 NOTE — SUBJECTIVE & OBJECTIVE
Interval History: See hospital course for today      Review of Systems   Constitutional:  Positive for activity change, appetite change and fatigue.   HENT:  Positive for trouble swallowing.    Respiratory:  Positive for cough and shortness of breath.    Cardiovascular:  Negative for palpitations.   Gastrointestinal:  Negative for abdominal pain, constipation, nausea and vomiting.   Neurological:  Positive for weakness.   Psychiatric/Behavioral:  Positive for decreased concentration and dysphoric mood. Negative for agitation, behavioral problems and confusion. The patient is not nervous/anxious.      Objective:     Vital Signs (Most Recent):  Temp: 98.3 °F (36.8 °C) (01/28/25 0838)  Pulse: 99 (01/28/25 0934)  Resp: 20 (01/28/25 0934)  BP: 135/63 (01/28/25 0838)  SpO2: 100 % (01/28/25 0957) Vital Signs (24h Range):  Temp:  [97.6 °F (36.4 °C)-98.9 °F (37.2 °C)] 98.3 °F (36.8 °C)  Pulse:  [] 99  Resp:  [16-22] 20  SpO2:  [77 %-100 %] 100 %  BP: (112-191)/(53-83) 135/63     Weight: 74.4 kg (164 lb 0.4 oz)  Body mass index is 26.47 kg/m².    Intake/Output Summary (Last 24 hours) at 1/28/2025 1122  Last data filed at 1/28/2025 1104  Gross per 24 hour   Intake 1700 ml   Output 600 ml   Net 1100 ml         Physical Exam  Vitals and nursing note reviewed. Exam conducted with a chaperone present (nursing, rt).   Constitutional:       General: She is not in acute distress.     Appearance: She is ill-appearing. She is not toxic-appearing.      Interventions: Nasal cannula in place.   HENT:      Head: Normocephalic and atraumatic.      Comments: Edentulous   Cardiovascular:      Rate and Rhythm: Tachycardia present.   Pulmonary:      Effort: Pulmonary effort is normal. Tachypnea present. No respiratory distress.      Breath sounds: Decreased air movement present. Rales present.   Abdominal:      Palpations: Abdomen is soft.      Tenderness: There is no abdominal tenderness.   Skin:     General: Skin is warm.       Coloration: Skin is pale.   Neurological:      Mental Status: She is alert and oriented to person, place, and time.      Motor: Weakness present.   Psychiatric:         Mood and Affect: Mood is depressed.         Speech: Speech normal.         Behavior: Behavior is cooperative.             Significant Labs: All pertinent labs within the past 24 hours have been reviewed.  ABGs:   Recent Labs   Lab 01/28/25  0911   PH 7.472*   PCO2 31.8*   HCO3 23.3*   POCSATURATED 80   BE 0   PO2 41*     CBC:   Recent Labs   Lab 01/28/25  0435   WBC 13.41*   HGB 14.1   HCT 42.5        CMP:   Recent Labs   Lab 01/27/25  0539      K 3.5      CO2 24   *   BUN 16   CREATININE 0.6   CALCIUM 9.6   ANIONGAP 10     Respiratory culture growing rare yeast, rare wbcs  Ginny positive   Complement within normal limits     Significant Imaging: I have reviewed all pertinent imaging results/findings within the past 24 hours.  CXR: I have reviewed all pertinent results/findings within the past 24 hours and my personal findings are:  persistent multifocal pneumonia

## 2025-01-28 NOTE — AI DETERIORATION ALERT
Artificial Intelligence Notification  [facility]  [id number] [place] [address] Dr Arash JEFFERY [address]  Phone: [phone]    This documentation was triggered by an Artificial Intelligence Notification:    Admit Date: 2025   LOS: 4  Code Status: Full Code  : 1950  Age: 74 y.o.  Weight:   Wt Readings from Last 1 Encounters:   25 74.4 kg (164 lb 0.4 oz)        Sex: female  Bed: A236/A236 A  MRN: [id number]  Attending Physician: Farhat Mason MD     Date of Alert: 2025  Time AI Alert Received: 931            Vitals:    25 09   BP:    Pulse: 99   Resp: 20   Temp:      SpO2: 100 %      Artificial Intelligence alert discussed with Provider:     Name: Dr. Mason   Date/Time of Provider Notification: 934      Patient Condition: Dr. Mason was aware of deterioration. She already addressed pt's respiratory issues and placed pt on vapotherm

## 2025-01-28 NOTE — ASSESSMENT & PLAN NOTE
Patient's most recent potassium results are listed below.   Recent Labs     01/27/25  0539   K 3.5       Plan  - Replete potassium per protocol  - Monitor potassium Daily  - Patient's hypokalemia is improving  - mg 1.8

## 2025-01-28 NOTE — ASSESSMENT & PLAN NOTE
"Patient's FSGs are controlled on current medication regimen.  Last A1c reviewed-   Lab Results   Component Value Date    HGBA1C 5.0 01/25/2025     Most recent fingerstick glucose reviewed- No results for input(s): "POCTGLUCOSE" in the last 24 hours.    Current correctional scale  Low  Maintain anti-hyperglycemic dose as follows-   Antihyperglycemics (From admission, onward)      None        No History of diabetes.  Plan:  -SSI  -A1c  -Accu-checks  -Hypoglycemic protocol      On systemic steroids with poor po intake  Due to poor po intake defer sliding scale insulin  Continue monitoring      "

## 2025-01-28 NOTE — ASSESSMENT & PLAN NOTE
Chronic, controlled.  Latest blood pressure and vitals reviewed-   Temp:  [97.6 °F (36.4 °C)-98.9 °F (37.2 °C)]   Pulse:  []   Resp:  [16-22]   BP: (112-191)/(53-83)   SpO2:  [77 %-100 %] .   Home meds for hypertension were reviewed and noted below.   Hypertension Medications               metoprolol succinate (TOPROL-XL) 50 MG 24 hr tablet Take 1 tablet by mouth once daily.            While in the hospital, will manage blood pressure as follows; Continue home antihypertensive regimen    Will utilize p.r.n. blood pressure medication only if patient's blood pressure greater than  180/110 and she develops symptoms such as worsening chest pain or shortness of breath.

## 2025-01-28 NOTE — PLAN OF CARE
Problem: Pneumonia  Goal: Resolution of Infection Signs and Symptoms  Outcome: Progressing     Problem: Skin Injury Risk Increased  Goal: Skin Health and Integrity  Outcome: Progressing     Problem: Fall Injury Risk  Goal: Absence of Fall and Fall-Related Injury  Outcome: Progressing     Problem: Airway Clearance Ineffective  Goal: Effective Airway Clearance  Outcome: Progressing     Problem: Adult Inpatient Plan of Care  Goal: Plan of Care Review  Outcome: Progressing  Goal: Patient-Specific Goal (Individualized)  Outcome: Progressing  Goal: Absence of Hospital-Acquired Illness or Injury  Outcome: Progressing  Goal: Optimal Comfort and Wellbeing  Outcome: Progressing  Goal: Readiness for Transition of Care  Outcome: Progressing

## 2025-01-28 NOTE — ASSESSMENT & PLAN NOTE
Patient with Hypoxic Respiratory failure which is Acute.  she is not on home oxygen. Supplemental oxygen was provided and noted- Oxygen Concentration (%):  [36-90] 90    .   Signs/symptoms of respiratory failure include- tachypnea, increased work of breathing, and respiratory distress. Contributing diagnoses includes - Pneumonia Labs and images were reviewed. Patient Has not had a recent ABG. Will treat underlying causes and adjust management of respiratory failure as follows- continue current regimen    Worsening  On vapotherm 2/2 persistent pneumonia   Treatment adjusted to include systemic steroids and pulmicort  Wean as able  Pulmonology following

## 2025-01-28 NOTE — SIGNIFICANT EVENT
Message from respiratory therapy  Oxygen sats 85% on 4L, increased to 10L    Systemic steroids completed yesterday  Received xanax last night  Coughing noted after drinking    Tachypnea  Pale  Respiratory distress  Mouth breathing  Decreased air movement, right middle lobe  Rales   Edentulous  Weakness   Ill appearing   AO3    Chest x-ray stat ordered and reviewed  ABG ordered and reviewed  Resume systemic steroids  Add budesonide nebs   Will increase supplemental oxygen needs to Vapotherm   Monitor  Pulmonology following   Speech note reviewed  Aspiration precautions   May need repeat ct chest

## 2025-01-28 NOTE — PT/OT/SLP PROGRESS
Occupational Therapy      Patient Name:  Linda Segura   MRN:  [id number]    OT treatment attempted at 0925- Pt on HOLD per Ida with respiratory, stating patient about to be placed on vapotherm due to desat at rest (83% on 4L of O2). Will continue efforts.    Loan Gomez MOT, LOTR    1/28/2025

## 2025-01-28 NOTE — PROGRESS NOTES
[facility])  [facility] Medicine  Progress Note    Patient Name: Linda Segura  MRN: [id number]  Patient Class: IP- Inpatient   Admission Date: 1/24/2025  Length of Stay: 4 days  Attending Physician: Farhat Mason MD  Primary Care Provider: Rolly Hammond MD        Subjective     Principal Problem:Multifocal pneumonia        HPI:  Linda Segura is a 74 y.o. female with a PMH  has a past medical history of Abnormal Pap smear of vagina, Anxiety, Anxiety and depression, Cancer, CVA (cerebral vascular accident), Essential hypertension (6/12/2018), Mental disorder, Other specified hypothyroidism (2/13/2021), and Scoliosis.  Presented to outside facility for evaluation of productive cough and generalized weakness with fever of 101 with associated chills started 2 days ago.  Denies any recent illnesses or sick contacts.  Reports minimal relief with over-the-counter medications.  Denies any chest pain, palpitations, shortness for breath, dyspnea exertion, or any other symptoms.    ER workup revealed CBC to be unremarkable.  CMP revealed potassium 2.8 with CBG of 240 mg/dL.  .  Troponin 0.029.  Initial lactic acid 3.7 with repeat lactic acid of 1.1.  Procalcitonin level 0.71.  Flu/COVID negative.  UA unremarkable.  X-ray revealed development of multilobar pneumonia.  EKG revealed normal sinus rhythm with prolonged QT with a ventricular rate of 82 beats per minute and a QT/QTC of 428/500.  Initial O2 saturation of 88% on room air with currently 96% on 4 liters/minute via nasal cannula.  Patient received 500 mg azithromycin 1 g Rocephin IV at outside facility.  Patient also received 10 mEq potassium IV and 1, 848 cc normal saline bolus.  Hospital Medicine consulted to admit patient for multifocal pneumonia.  Patient in agreement with treatment plan.  Patient admitted under inpatient status.    PCP: Rolly Hammond      Overview/Hospital Course:  1/26 admitted for pneumonia. Respiratory  infection panel positive for rhinovirus. Endorses improvement in symptoms of cough and dyspnea but continues with weakness and poor po intake. Pulmonology consulted for pneumonia and pleural effusion. Continue current regimen.  1/27 endorses improvement in symptoms of decreased appetite, weakness, breathing and cough. Still requiring high level of supplemental oxygen, 4L nasal cannula. Physical/occupational therapy recommending homehealth physical/occupational therapy.  1/28 worsening oxygen sats on nasal cannula. States tolerated grits this AM. Denies coughing with eating. Denies wearing nippv at home. Speech note reviewed. Respiratory at bedside. See significant event note. Oxygen sats improved on vapotherm. Continue to wean. Added pulmicort nebs and continued systemic steroids.     Interval History: See hospital course for today      Review of Systems   Constitutional:  Positive for activity change, appetite change and fatigue.   HENT:  Positive for trouble swallowing.    Respiratory:  Positive for cough and shortness of breath.    Cardiovascular:  Negative for palpitations.   Gastrointestinal:  Negative for abdominal pain, constipation, nausea and vomiting.   Neurological:  Positive for weakness.   Psychiatric/Behavioral:  Positive for decreased concentration and dysphoric mood. Negative for agitation, behavioral problems and confusion. The patient is not nervous/anxious.      Objective:     Vital Signs (Most Recent):  Temp: 98.3 °F (36.8 °C) (01/28/25 0838)  Pulse: 99 (01/28/25 0934)  Resp: 20 (01/28/25 0934)  BP: 135/63 (01/28/25 0838)  SpO2: 100 % (01/28/25 0957) Vital Signs (24h Range):  Temp:  [97.6 °F (36.4 °C)-98.9 °F (37.2 °C)] 98.3 °F (36.8 °C)  Pulse:  [] 99  Resp:  [16-22] 20  SpO2:  [77 %-100 %] 100 %  BP: (112-191)/(53-83) 135/63     Weight: 74.4 kg (164 lb 0.4 oz)  Body mass index is 26.47 kg/m².    Intake/Output Summary (Last 24 hours) at 1/28/2025 1122  Last data filed at 1/28/2025  1104  Gross per 24 hour   Intake 1700 ml   Output 600 ml   Net 1100 ml         Physical Exam  Vitals and nursing note reviewed. Exam conducted with a chaperone present (nursing, rt).   Constitutional:       General: She is not in acute distress.     Appearance: She is ill-appearing. She is not toxic-appearing.      Interventions: Nasal cannula in place.   HENT:      Head: Normocephalic and atraumatic.      Comments: Edentulous   Cardiovascular:      Rate and Rhythm: Tachycardia present.   Pulmonary:      Effort: Pulmonary effort is normal. Tachypnea present. No respiratory distress.      Breath sounds: Decreased air movement present. Rales present.   Abdominal:      Palpations: Abdomen is soft.      Tenderness: There is no abdominal tenderness.   Skin:     General: Skin is warm.      Coloration: Skin is pale.   Neurological:      Mental Status: She is alert and oriented to person, place, and time.      Motor: Weakness present.   Psychiatric:         Mood and Affect: Mood is depressed.         Speech: Speech normal.         Behavior: Behavior is cooperative.             Significant Labs: All pertinent labs within the past 24 hours have been reviewed.  ABGs:   Recent Labs   Lab 01/28/25  0911   PH 7.472*   PCO2 31.8*   HCO3 23.3*   POCSATURATED 80   BE 0   PO2 41*     CBC:   Recent Labs   Lab 01/28/25  0435   WBC 13.41*   HGB 14.1   HCT 42.5        CMP:   Recent Labs   Lab 01/27/25  0539      K 3.5      CO2 24   *   BUN 16   CREATININE 0.6   CALCIUM 9.6   ANIONGAP 10     Respiratory culture growing rare yeast, rare wbcs  Ginny positive   Complement within normal limits     Significant Imaging: I have reviewed all pertinent imaging results/findings within the past 24 hours.  CXR: I have reviewed all pertinent results/findings within the past 24 hours and my personal findings are:  persistent multifocal pneumonia     Assessment and Plan     * Multifocal pneumonia  Patient has a diagnosis of  pneumonia. The cause of the pneumonia is viral in etiology due to  rhinovirus . The pneumonia is improving. The patient has the following signs/symptoms of pneumonia: cough, sputum production, and shortness of breath. The patient does have a current oxygen requirement and the patient does not have a home oxygen requirement. I have reviewed the pertinent imaging. The following cultures have been collected: Blood cultures The culture results are listed below.     Current antimicrobial regimen consists of the antibiotics listed below. Will monitor patient closely and continue current treatment plan unchanged.    Antibiotics (From admission, onward)      Start     Stop Route Frequency Ordered    01/25/25 2100  doxycycline tablet 100 mg         -- Oral Every 12 hours 01/25/25 1809    01/25/25 1400  cefTRIAXone injection 1 g         -- IV Every 24 hours (non-standard times) 01/24/25 2351            Microbiology Results (last 7 days)       Procedure Component Value Units Date/Time    Culture, Respiratory with Gram Stain [[id number]] Collected: 01/27/25 1919    Order Status: Completed Specimen: Respiratory from Sputum Updated: 01/28/25 0629     Gram Stain (Respiratory) <10 epithelial cells per low power field.     Gram Stain (Respiratory) Rare WBC's     Gram Stain (Respiratory) Rare yeast    Blood culture x two cultures. Draw prior to antibiotics. [[id number]] Collected: 01/24/25 1250    Order Status: Completed Specimen: Blood from Peripheral, Forearm, Left Updated: 01/28/25 0612     Blood Culture, Routine No Growth to date      No Growth to date      No Growth to date      No Growth to date    Narrative:      Aerobic and anaerobic    Blood culture x two cultures. Draw prior to antibiotics. [[id number]] Collected: 01/24/25 1256    Order Status: Completed Specimen: Blood from Peripheral, Antecubital, Right Updated: 01/28/25 0612     Blood Culture, Routine No Growth to date      No Growth to date      No Growth to date       No Growth to date    Narrative:      Aerobic and anaerobic    Respiratory Infection Panel (PCR), Nasopharyngeal [[id number]]  (Abnormal) Collected: 01/25/25 1406    Order Status: Completed Specimen: Nasopharyngeal Swab Updated: 01/27/25 1211     Respiratory Infection Panel Source NP Swab     Adenovirus Not Detected     Coronavirus 229E, Common Cold Virus Not Detected     Coronavirus HKU1, Common Cold Virus Not Detected     Coronavirus NL63, Common Cold Virus Not Detected     Coronavirus OC43, Common Cold Virus Not Detected     Comment: The Coronavirus strains detected in this test cause the common cold.  These strains are not the COVID-19 (novel Coronavirus)strain   associated with the respiratory disease outbreak.          SARS-CoV2 (COVID-19) Qualitative PCR Not Detected     Human Metapneumovirus Not Detected     Human Rhinovirus/Enterovirus Detected     Influenza A (subtypes H1, H1-2009,H3) Not Detected     Influenza B Not Detected     Parainfluenza Virus 1 Not Detected     Parainfluenza Virus 2 Not Detected     Parainfluenza Virus 3 Not Detected     Parainfluenza Virus 4 Not Detected     Respiratory Syncytial Virus Not Detected     Bordetella Parapertussis (SE5043) Not Detected     Bordetella pertussis (ptxP) Not Detected     Chlamydia pneumoniae Not Detected     Mycoplasma pneumoniae Not Detected     Comment: Respiratory Infection Panel testing performed by Multiplex PCR.       Narrative:      Assay not valid for lower respiratory specimens, alternate  testing required.        Worsening   Abg reviewed   Blood culture negative growth to date x 4 days  On vapotherm   Add pulmicort and systemic steroids continued   Has incentive spirometer and aerobika   Physical/occupational therapy recommending homehealth physical/occupational therapy     Thrombocytopenia  The likely etiology of thrombocytopenia is medication induced, the suspected medication(s) is/are plavix . The patients 3 most recent labs are listed  "below.  Recent Labs     01/28/25  0435          Plan  - Will transfuse if platelet count is <50k (if undergoing surgical procedure or have active bleeding).  - resolved       Rhinovirus infection  Supportive care as above for pneumonia   Ginny positive   Further studies per pulmonology pending  Antifungal need?  Sputum culture with rare yeast    Acute hypoxemic respiratory failure  Patient with Hypoxic Respiratory failure which is Acute.  she is not on home oxygen. Supplemental oxygen was provided and noted- Oxygen Concentration (%):  [36-90] 90    .   Signs/symptoms of respiratory failure include- tachypnea, increased work of breathing, and respiratory distress. Contributing diagnoses includes - Pneumonia Labs and images were reviewed. Patient Has not had a recent ABG. Will treat underlying causes and adjust management of respiratory failure as follows- continue current regimen    Worsening  On vapotherm 2/2 persistent pneumonia   Treatment adjusted to include systemic steroids and pulmicort  Wean as able  Pulmonology following    Hypokalemia  Patient's most recent potassium results are listed below.   Recent Labs     01/27/25  0539   K 3.5       Plan  - Replete potassium per protocol  - Monitor potassium Daily  - Patient's hypokalemia is improving  - mg 1.8    Prolonged Q-T interval on ECG  Discontinue azithromycin  Add doxycycline     History of CVA (cerebrovascular accident)  -continue ASA, plavix, and lipitor  Thrombocytopenia noted   Continue plavix  Discontinue lovenox      Hyperglycemia  Patient's FSGs are controlled on current medication regimen.  Last A1c reviewed-   Lab Results   Component Value Date    HGBA1C 5.0 01/25/2025     Most recent fingerstick glucose reviewed- No results for input(s): "POCTGLUCOSE" in the last 24 hours.    Current correctional scale  Low  Maintain anti-hyperglycemic dose as follows-   Antihyperglycemics (From admission, onward)      None        No History of " diabetes.  Plan:  -SSI  -A1c  -Accu-checks  -Hypoglycemic protocol      On systemic steroids with poor po intake  Due to poor po intake defer sliding scale insulin  Continue monitoring        Other specified hypothyroidism  Patient has chronic hypothyroidism. TFTs reviewed-   Lab Results   Component Value Date    TSH 6.52 (H) 03/20/2024   Continue home medication(s)   Follow up outpatient       Essential hypertension  Chronic, controlled.  Latest blood pressure and vitals reviewed-   Temp:  [97.6 °F (36.4 °C)-98.9 °F (37.2 °C)]   Pulse:  []   Resp:  [16-22]   BP: (112-191)/(53-83)   SpO2:  [77 %-100 %] .   Home meds for hypertension were reviewed and noted below.   Hypertension Medications               metoprolol succinate (TOPROL-XL) 50 MG 24 hr tablet Take 1 tablet by mouth once daily.            While in the hospital, will manage blood pressure as follows; Continue home antihypertensive regimen    Will utilize p.r.n. blood pressure medication only if patient's blood pressure greater than  180/110 and she develops symptoms such as worsening chest pain or shortness of breath.      GERD (gastroesophageal reflux disease)  Chronic. Stable. Currently asymptomatic. Home medications include PPI/Antacids as needed.  Plan:  -Continue PPI/Antacids as needed         Anxiety and depression  Chronic. Stable. Not in acute exacerbation and currently denies endorsing any suicidal/homicidal ideations.   Plan:  Received xanax last night  -Continue home medications (xanax and zoloft)        VTE Risk Mitigation (From admission, onward)           Ordered     IP VTE HIGH RISK PATIENT  Once         01/24/25 2354     Place sequential compression device  Until discontinued         01/24/25 2354                    Discharge Planning   HERNANDO:      Code Status: Full Code   Medical Readiness for Discharge Date:   Discharge Plan A: Home, Home with family                    Farhat Mason MD  Department of Hospital Medicine   [facility] -  Telemetry ([facility])

## 2025-01-28 NOTE — ASSESSMENT & PLAN NOTE
Chronic. Stable. Not in acute exacerbation and currently denies endorsing any suicidal/homicidal ideations.   Plan:  Received xanax last night  -Continue home medications (xanax and zoloft)

## 2025-01-28 NOTE — PROGRESS NOTES
Rheumatology was contacted due to positive SASKIA. [facility] does not do in-hospital consults. I reviewed the patient's chart. I did not examine or speak to the patient.    Reviewed CT images. I don't see honeycombing or NSIP changes to concern for connective tissue disease-related ILD, but it's hard to tell without high resolution images. It seems the SASKIA was checked because the ESR and CRP were elevated. Notes don't suggest that she is having any rashes, petechiae, joint swelling, etc. Complements are normal and there is no proteinuria. The elevated inflammatory markers are likely from the pneumonia. CRP being >100 supports this. Low titer SASKIA is not concerning at this point and might be positive due to acute infection. Overall I have low suspicion for connective tissue disease based on these labs and reported symptoms. I don't have any additional medication recommendations at this point. I recommend to follow up on the SASKIA profile and please reach back out to me if anything in the profile is positive and at that point I can set her up for outpatient follow up (or external referral for faster Rheumatology access).

## 2025-01-28 NOTE — ASSESSMENT & PLAN NOTE
Supportive care as above for pneumonia   Ginny positive   Further studies per pulmonology pending  Antifungal need?  Sputum culture with rare yeast

## 2025-01-28 NOTE — PROGRESS NOTES
P.T. MET HECTOR RESPIRATORY THERAPIST OUTSIDE PT ROOM. PT WITH DECLINE IN RESPIRATORY STATE REQUIRING VAPOTHERM TX AT THIS TIME. P.T. HOLD TX TODAY. P.T. TO FOLLOW UP NEXT VISIT. THANK YOU Juanita Wright, PT

## 2025-01-28 NOTE — ASSESSMENT & PLAN NOTE
"- Patient has a diagnosis of pneumonia. The cause of the pneumonia is suspected to be bacterial in etiology but organism is not known. The pneumonia is stable. The patient has the following signs/symptoms of pneumonia: persistent hypoxia  and cough. The patient does have a current oxygen requirement and the patient does have a home oxygen requirement. I have reviewed the pertinent imaging. The following cultures have been collected: Blood cultures and Sputum culture The culture results are listed below.   - Current antimicrobial regimen consists of the antibiotics listed below. Will monitor patient closely and continue current treatment plan unchanged.  - Now with productive cough; "thick, brown" sputum - collect sputum cx  - Continue rocephin + doxy  - Mycoplasm, legionella, fungitell pending    Antibiotics (From admission, onward)      Start     Stop Route Frequency Ordered    01/25/25 2100  doxycycline tablet 100 mg         -- Oral Every 12 hours 01/25/25 1809    01/25/25 1400  cefTRIAXone injection 1 g         -- IV Every 24 hours (non-standard times) 01/24/25 2351            Microbiology Results (last 7 days)       Procedure Component Value Units Date/Time    Culture, Respiratory with Gram Stain [[id number]] Collected: 01/27/25 1919    Order Status: Completed Specimen: Respiratory from Sputum Updated: 01/28/25 0629     Gram Stain (Respiratory) <10 epithelial cells per low power field.     Gram Stain (Respiratory) Rare WBC's     Gram Stain (Respiratory) Rare yeast    Blood culture x two cultures. Draw prior to antibiotics. [[id number]] Collected: 01/24/25 1250    Order Status: Completed Specimen: Blood from Peripheral, Forearm, Left Updated: 01/28/25 0612     Blood Culture, Routine No Growth to date      No Growth to date      No Growth to date      No Growth to date    Narrative:      Aerobic and anaerobic    Blood culture x two cultures. Draw prior to antibiotics. [[id number]] Collected: 01/24/25 1256    Order " Status: Completed Specimen: Blood from Peripheral, Antecubital, Right Updated: 01/28/25 0612     Blood Culture, Routine No Growth to date      No Growth to date      No Growth to date      No Growth to date    Narrative:      Aerobic and anaerobic    Respiratory Infection Panel (PCR), Nasopharyngeal [[id number]]  (Abnormal) Collected: 01/25/25 1406    Order Status: Completed Specimen: Nasopharyngeal Swab Updated: 01/27/25 1211     Respiratory Infection Panel Source NP Swab     Adenovirus Not Detected     Coronavirus 229E, Common Cold Virus Not Detected     Coronavirus HKU1, Common Cold Virus Not Detected     Coronavirus NL63, Common Cold Virus Not Detected     Coronavirus OC43, Common Cold Virus Not Detected     Comment: The Coronavirus strains detected in this test cause the common cold.  These strains are not the COVID-19 (novel Coronavirus)strain   associated with the respiratory disease outbreak.          SARS-CoV2 (COVID-19) Qualitative PCR Not Detected     Human Metapneumovirus Not Detected     Human Rhinovirus/Enterovirus Detected     Influenza A (subtypes H1, H1-2009,H3) Not Detected     Influenza B Not Detected     Parainfluenza Virus 1 Not Detected     Parainfluenza Virus 2 Not Detected     Parainfluenza Virus 3 Not Detected     Parainfluenza Virus 4 Not Detected     Respiratory Syncytial Virus Not Detected     Bordetella Parapertussis (II2186) Not Detected     Bordetella pertussis (ptxP) Not Detected     Chlamydia pneumoniae Not Detected     Mycoplasma pneumoniae Not Detected     Comment: Respiratory Infection Panel testing performed by Multiplex PCR.       Narrative:      Assay not valid for lower respiratory specimens, alternate  testing required.            1/28 patient with multifocal pneumonia.  Sputum culture is pending.  She is currently on antibiotics.  Not sure this is all related to rhinovirus.  She was found to have a positive SASKIA as well.  She denies any exposures.  Patient has a history of  tongue cancer in 2007 and did get radiation.  States some trouble with swallowing and choking.  She was placed on Vapotherm this morning.  Upon arrival this was off her face so replaced it and oxygenation improved.  She is currently on FiO2 of 80 and a flow of 25.  She was started on steroids.  Did discuss with medicine and consider rheumatology consult as well.  Have ordered speech consult.  Continue to follow up sputum cultures.  Can consider dose of Lasix if needed.f/u pending studies

## 2025-01-28 NOTE — ASSESSMENT & PLAN NOTE
- Patient with Hypoxic Respiratory failure which is Acute. She is not on home oxygen. Supplemental oxygen was provided and noted- Oxygen Concentration (%):  [36-90] 80  - Signs/symptoms of respiratory failure include- increased work of breathing and respiratory distress. Contributing diagnoses includes - Pneumonia Labs and images were reviewed. Patient Has not had a recent ABG. Will treat underlying causes and adjust management of respiratory failure as follows-   - Currently on 4 liters/minute oxygen, continue to wean as able  - Continue scheduled nebs  - Empiric CAP coverage, sputum cx if able  - Monitor for worsening fevers, WBC; CXR if worsening respiratory status  - + SASKIA screen, elevated CRP/ESR; complements pending      1/28 see pneumonia

## 2025-01-28 NOTE — ASSESSMENT & PLAN NOTE
Patient has chronic hypothyroidism. TFTs reviewed-   Lab Results   Component Value Date    TSH 6.52 (H) 03/20/2024   Continue home medication(s)   Follow up outpatient      Mogen Clamp

## 2025-01-28 NOTE — ASSESSMENT & PLAN NOTE
The likely etiology of thrombocytopenia is medication induced, the suspected medication(s) is/are plavix . The patients 3 most recent labs are listed below.  Recent Labs     01/28/25  0435          Plan  - Will transfuse if platelet count is <50k (if undergoing surgical procedure or have active bleeding).  - resolved

## 2025-01-29 PROBLEM — D69.6 THROMBOCYTOPENIA: Status: RESOLVED | Noted: 2025-01-26 | Resolved: 2025-01-29

## 2025-01-29 PROBLEM — E87.6 HYPOKALEMIA: Status: RESOLVED | Noted: 2025-01-25 | Resolved: 2025-01-29

## 2025-01-29 LAB
ALBUMIN SERPL BCP-MCNC: 2.2 G/DL (ref 3.5–5.2)
ALP SERPL-CCNC: 99 U/L (ref 40–150)
ALT SERPL W/O P-5'-P-CCNC: 49 U/L (ref 10–44)
ANION GAP SERPL CALC-SCNC: 8 MMOL/L (ref 8–16)
ANTI SM ANTIBODY: 0.09 RATIO (ref 0–0.99)
ANTI SM/RNP ANTIBODY: 0.07 RATIO (ref 0–0.99)
ANTI-SM INTERPRETATION: NEGATIVE
ANTI-SM/RNP INTERPRETATION: NEGATIVE
ANTI-SSA ANTIBODY: 0.07 RATIO (ref 0–0.99)
ANTI-SSA INTERPRETATION: NEGATIVE
ANTI-SSB ANTIBODY: 0.1 RATIO (ref 0–0.99)
ANTI-SSB INTERPRETATION: NEGATIVE
AST SERPL-CCNC: 36 U/L (ref 10–40)
BASOPHILS # BLD AUTO: 0.02 K/UL (ref 0–0.2)
BASOPHILS NFR BLD: 0.2 % (ref 0–1.9)
BILIRUB SERPL-MCNC: 0.5 MG/DL (ref 0.1–1)
BUN SERPL-MCNC: 22 MG/DL (ref 8–23)
CALCIUM SERPL-MCNC: 9.5 MG/DL (ref 8.7–10.5)
CHLORIDE SERPL-SCNC: 105 MMOL/L (ref 95–110)
CO2 SERPL-SCNC: 28 MMOL/L (ref 23–29)
CREAT SERPL-MCNC: 0.6 MG/DL (ref 0.5–1.4)
DIFFERENTIAL METHOD BLD: ABNORMAL
DSDNA AB SER-ACNC: NORMAL [IU]/ML
EOSINOPHIL # BLD AUTO: 0 K/UL (ref 0–0.5)
EOSINOPHIL NFR BLD: 0 % (ref 0–8)
ERYTHROCYTE [DISTWIDTH] IN BLOOD BY AUTOMATED COUNT: 13.2 % (ref 11.5–14.5)
EST. GFR  (NO RACE VARIABLE): >60 ML/MIN/1.73 M^2
GLUCOSE SERPL-MCNC: 146 MG/DL (ref 70–110)
HCT VFR BLD AUTO: 36.6 % (ref 37–48.5)
HGB BLD-MCNC: 12.2 G/DL (ref 12–16)
IMM GRANULOCYTES # BLD AUTO: 0.11 K/UL (ref 0–0.04)
IMM GRANULOCYTES NFR BLD AUTO: 1.2 % (ref 0–0.5)
L PNEUMO AB SER QL: NEGATIVE
L PNEUMO AG UR QL IA: NEGATIVE
LYMPHOCYTES # BLD AUTO: 0.3 K/UL (ref 1–4.8)
LYMPHOCYTES NFR BLD: 3.7 % (ref 18–48)
MCH RBC QN AUTO: 29.3 PG (ref 27–31)
MCHC RBC AUTO-ENTMCNC: 33.3 G/DL (ref 32–36)
MCV RBC AUTO: 88 FL (ref 82–98)
MONOCYTES # BLD AUTO: 0.5 K/UL (ref 0.3–1)
MONOCYTES NFR BLD: 5.4 % (ref 4–15)
NEUTROPHILS # BLD AUTO: 8.3 K/UL (ref 1.8–7.7)
NEUTROPHILS NFR BLD: 89.5 % (ref 38–73)
NRBC BLD-RTO: 0 /100 WBC
PLATELET # BLD AUTO: 151 K/UL (ref 150–450)
PMV BLD AUTO: 10.4 FL (ref 9.2–12.9)
POTASSIUM SERPL-SCNC: 4.1 MMOL/L (ref 3.5–5.1)
PROT SERPL-MCNC: 5.4 G/DL (ref 6–8.4)
RBC # BLD AUTO: 4.16 M/UL (ref 4–5.4)
SODIUM SERPL-SCNC: 141 MMOL/L (ref 136–145)
WBC # BLD AUTO: 9.27 K/UL (ref 3.9–12.7)

## 2025-01-29 PROCEDURE — 63600175 PHARM REV CODE 636 W HCPCS: Mod: JZ,TB | Performed by: FAMILY MEDICINE

## 2025-01-29 PROCEDURE — 92610 EVALUATE SWALLOWING FUNCTION: CPT

## 2025-01-29 PROCEDURE — 27000207 HC ISOLATION

## 2025-01-29 PROCEDURE — 94799 UNLISTED PULMONARY SVC/PX: CPT

## 2025-01-29 PROCEDURE — 63600175 PHARM REV CODE 636 W HCPCS: Performed by: NURSE PRACTITIONER

## 2025-01-29 PROCEDURE — 85025 COMPLETE CBC W/AUTO DIFF WBC: CPT | Performed by: NURSE PRACTITIONER

## 2025-01-29 PROCEDURE — 99900035 HC TECH TIME PER 15 MIN (STAT)

## 2025-01-29 PROCEDURE — 25000003 PHARM REV CODE 250: Performed by: FAMILY MEDICINE

## 2025-01-29 PROCEDURE — 97110 THERAPEUTIC EXERCISES: CPT

## 2025-01-29 PROCEDURE — 80053 COMPREHEN METABOLIC PANEL: CPT | Performed by: NURSE PRACTITIONER

## 2025-01-29 PROCEDURE — 25000003 PHARM REV CODE 250: Performed by: NURSE PRACTITIONER

## 2025-01-29 PROCEDURE — 94664 DEMO&/EVAL PT USE INHALER: CPT

## 2025-01-29 PROCEDURE — 25000242 PHARM REV CODE 250 ALT 637 W/ HCPCS: Performed by: FAMILY MEDICINE

## 2025-01-29 PROCEDURE — 27000249 HC VAPOTHERM CIRCUIT

## 2025-01-29 PROCEDURE — 94761 N-INVAS EAR/PLS OXIMETRY MLT: CPT

## 2025-01-29 PROCEDURE — 21400001 HC TELEMETRY ROOM

## 2025-01-29 PROCEDURE — 36415 COLL VENOUS BLD VENIPUNCTURE: CPT | Performed by: NURSE PRACTITIONER

## 2025-01-29 PROCEDURE — 27100171 HC OXYGEN HIGH FLOW UP TO 24 HOURS

## 2025-01-29 PROCEDURE — 94640 AIRWAY INHALATION TREATMENT: CPT

## 2025-01-29 PROCEDURE — 97530 THERAPEUTIC ACTIVITIES: CPT

## 2025-01-29 RX ORDER — ENOXAPARIN SODIUM 100 MG/ML
40 INJECTION SUBCUTANEOUS EVERY 24 HOURS
Status: DISCONTINUED | OUTPATIENT
Start: 2025-01-29 | End: 2025-02-01 | Stop reason: HOSPADM

## 2025-01-29 RX ADMIN — SERTRALINE HYDROCHLORIDE 100 MG: 50 TABLET ORAL at 08:01

## 2025-01-29 RX ADMIN — ALBUTEROL SULFATE 2.5 MG: 0.83 SOLUTION RESPIRATORY (INHALATION) at 06:01

## 2025-01-29 RX ADMIN — BUDESONIDE INHALATION 0.5 MG: 0.5 SUSPENSION RESPIRATORY (INHALATION) at 07:01

## 2025-01-29 RX ADMIN — BUDESONIDE INHALATION 0.5 MG: 0.5 SUSPENSION RESPIRATORY (INHALATION) at 06:01

## 2025-01-29 RX ADMIN — LEVOTHYROXINE SODIUM 50 MCG: 0.05 TABLET ORAL at 06:01

## 2025-01-29 RX ADMIN — CEFTRIAXONE 1 G: 1 INJECTION, POWDER, FOR SOLUTION INTRAMUSCULAR; INTRAVENOUS at 01:01

## 2025-01-29 RX ADMIN — Medication 400 ML: at 01:01

## 2025-01-29 RX ADMIN — DOXYCYCLINE HYCLATE 100 MG: 100 TABLET, COATED ORAL at 08:01

## 2025-01-29 RX ADMIN — METHYLPREDNISOLONE SODIUM SUCCINATE 40 MG: 40 INJECTION, POWDER, FOR SOLUTION INTRAMUSCULAR; INTRAVENOUS at 09:01

## 2025-01-29 RX ADMIN — GUAIFENESIN 600 MG: 600 TABLET, EXTENDED RELEASE ORAL at 08:01

## 2025-01-29 RX ADMIN — ATORVASTATIN CALCIUM 10 MG: 10 TABLET, FILM COATED ORAL at 08:01

## 2025-01-29 RX ADMIN — ASPIRIN 81 MG: 81 TABLET, COATED ORAL at 09:01

## 2025-01-29 RX ADMIN — ENOXAPARIN SODIUM 40 MG: 40 INJECTION SUBCUTANEOUS at 05:01

## 2025-01-29 RX ADMIN — METOPROLOL SUCCINATE 50 MG: 50 TABLET, EXTENDED RELEASE ORAL at 09:01

## 2025-01-29 RX ADMIN — Medication 400 ML: at 06:01

## 2025-01-29 RX ADMIN — Medication 400 ML: at 05:01

## 2025-01-29 RX ADMIN — PANTOPRAZOLE SODIUM 40 MG: 40 TABLET, DELAYED RELEASE ORAL at 09:01

## 2025-01-29 RX ADMIN — METHYLPREDNISOLONE SODIUM SUCCINATE 40 MG: 40 INJECTION, POWDER, FOR SOLUTION INTRAMUSCULAR; INTRAVENOUS at 08:01

## 2025-01-29 RX ADMIN — CLOPIDOGREL BISULFATE 75 MG: 75 TABLET ORAL at 06:01

## 2025-01-29 RX ADMIN — FUROSEMIDE 20 MG: 10 INJECTION, SOLUTION INTRAMUSCULAR; INTRAVENOUS at 09:01

## 2025-01-29 RX ADMIN — Medication 400 ML: at 11:01

## 2025-01-29 RX ADMIN — POTASSIUM CHLORIDE 40 MEQ: 1500 TABLET, EXTENDED RELEASE ORAL at 08:01

## 2025-01-29 RX ADMIN — ALBUTEROL SULFATE 2.5 MG: 0.83 SOLUTION RESPIRATORY (INHALATION) at 02:01

## 2025-01-29 RX ADMIN — ALBUTEROL SULFATE 2.5 MG: 0.83 SOLUTION RESPIRATORY (INHALATION) at 07:01

## 2025-01-29 RX ADMIN — GUAIFENESIN 600 MG: 600 TABLET, EXTENDED RELEASE ORAL at 09:01

## 2025-01-29 NOTE — ASSESSMENT & PLAN NOTE
-continue ASA, plavix, and lipitor  Thrombocytopenia noted   Continue plavix  Discontinue lovenox    1/29/25: thrombocytopenia resolved- will add Lovenox ppx

## 2025-01-29 NOTE — PROGRESS NOTES
[facility] ([facility])  [facility] Medicine  Progress Note    Patient Name: Linda Segura  MRN: [id number]  Patient Class: IP- Inpatient   Admission Date: 1/24/2025  Length of Stay: 5 days  Attending Physician: Hayde Medina MD  Primary Care Provider: Rolly Hammond MD        Subjective     Principal Problem:Multifocal pneumonia        HPI:  Linda Segura is a 74 y.o. female with a PMH  has a past medical history of Abnormal Pap smear of vagina, Anxiety, Anxiety and depression, Cancer, CVA (cerebral vascular accident), Essential hypertension (6/12/2018), Mental disorder, Other specified hypothyroidism (2/13/2021), and Scoliosis.  Presented to outside facility for evaluation of productive cough and generalized weakness with fever of 101 with associated chills started 2 days ago.  Denies any recent illnesses or sick contacts.  Reports minimal relief with over-the-counter medications.  Denies any chest pain, palpitations, shortness for breath, dyspnea exertion, or any other symptoms.    ER workup revealed CBC to be unremarkable.  CMP revealed potassium 2.8 with CBG of 240 mg/dL.  .  Troponin 0.029.  Initial lactic acid 3.7 with repeat lactic acid of 1.1.  Procalcitonin level 0.71.  Flu/COVID negative.  UA unremarkable.  X-ray revealed development of multilobar pneumonia.  EKG revealed normal sinus rhythm with prolonged QT with a ventricular rate of 82 beats per minute and a QT/QTC of 428/500.  Initial O2 saturation of 88% on room air with currently 96% on 4 liters/minute via nasal cannula.  Patient received 500 mg azithromycin 1 g Rocephin IV at outside facility.  Patient also received 10 mEq potassium IV and 1, 848 cc normal saline bolus.  Hospital Medicine consulted to admit patient for multifocal pneumonia.  Patient in agreement with treatment plan.  Patient admitted under inpatient status.    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   Initial lactic acid 3.7 with repeat lactic acid of 1.1. Procalcitonin level 0.71 . Echo showed normal LVEF. Respiratory infection panel positive for Rhinovirus. Blood cultures show NGTD.   Pulmonology consulted and agreed on POC. Speech consulted and noted no aspiration, although, pt did report intermittent globus sensation. Speech recommended OP GI eval.      On 1/28/25, respiratory status worsened requiring 80% Vapotherm. IV Lasix added. Mycoplasm, legionella, fungitell ordered. Sputum cultures pending. +Mycoplasm IgG, IgM negative.   +SASKIA, elevated CRP and ESR. Complements normal. dsDNA ab normal. Pulmonology recommended Rheumatology review case. Per Dr. Pfeiffer-low suspicion for connective tissue disease based on these labs and reported symptoms. No additional medication recommendations at this point. He recommended to follow up on the SASKIA profile and reach back out him if anything in the profile is positive and at that point I can set her up for outpatient follow up (or external referral for faster Rheumatology access).   On 1/29/25, Speech recommends MBSS in am     Physical/occupational therapy recommending home health         Interval History: pt feels some improvement. Currently on 70% vapotherm. +thick sputum. +generalized weakness and fatigue. Recommended OOB, IS.       Review of Systems   Constitutional:  Positive for activity change, appetite change and fatigue.   HENT:  Positive for trouble swallowing.    Respiratory:  Positive for cough and shortness of breath.    Cardiovascular:  Negative for palpitations.   Gastrointestinal:  Negative for abdominal pain, constipation, nausea and vomiting.    Neurological:  Positive for weakness.   Psychiatric/Behavioral:  Positive for decreased concentration and dysphoric mood. Negative for agitation, behavioral problems and confusion. The patient is not nervous/anxious.      Objective:     Vital Signs (Most Recent):  Temp: 97.7 °F (36.5 °C) (01/29/25 0836)  Pulse: 73 (01/29/25 0836)  Resp: 18 (01/29/25 0836)  BP: (!) 114/55 (01/29/25 0836)  SpO2: (!) 92 % (01/29/25 0836) Vital Signs (24h Range):  Temp:  [97.7 °F (36.5 °C)-98.4 °F (36.9 °C)] 97.7 °F (36.5 °C)  Pulse:  [66-97] 73  Resp:  [18-20] 18  SpO2:  [90 %-97 %] 92 %  BP: (105-118)/(51-59) 114/55     Weight: 74.4 kg (164 lb 0.4 oz)  Body mass index is 26.47 kg/m².    Intake/Output Summary (Last 24 hours) at 1/29/2025 1120  Last data filed at 1/29/2025 0637  Gross per 24 hour   Intake 1600 ml   Output 1365 ml   Net 235 ml         Physical Exam  Vitals and nursing note reviewed.   Constitutional:       General: She is not in acute distress.     Appearance: She is ill-appearing. She is not toxic-appearing.   HENT:      Head: Normocephalic and atraumatic.      Comments: Edentulous   Cardiovascular:      Rate and Rhythm: Normal rate and regular rhythm.   Pulmonary:      Effort: Tachypnea and accessory muscle usage present. No respiratory distress.      Breath sounds: Decreased air movement present. Rhonchi and rales present.      Comments: +conversational dyspnea   Vapotherm in place   Abdominal:      Palpations: Abdomen is soft.      Tenderness: There is no abdominal tenderness.   Skin:     General: Skin is warm.      Coloration: Skin is pale.   Neurological:      Mental Status: She is alert and oriented to person, place, and time.      Motor: Weakness present.   Psychiatric:         Mood and Affect: Mood normal.         Speech: Speech normal.         Behavior: Behavior is cooperative.             Significant Labs: All pertinent labs within the past 24 hours have been reviewed.  ABGs:   Recent Labs   Lab  01/28/25  0911   PH 7.472*   PCO2 31.8*   HCO3 23.3*   POCSATURATED 80   BE 0   PO2 41*     CBC:   Recent Labs   Lab 01/28/25  0435 01/29/25  0821   WBC 13.41* 9.27   HGB 14.1 12.2   HCT 42.5 36.6*    151     CMP:   Recent Labs   Lab 01/29/25  0821      K 4.1      CO2 28   *   BUN 22   CREATININE 0.6   CALCIUM 9.5   PROT 5.4*   ALBUMIN 2.2*   BILITOT 0.5   ALKPHOS 99   AST 36   ALT 49*   ANIONGAP 8     Respiratory culture growing rare yeast, rare wbcs  Ginny positive   Complement within normal limits     Significant Imaging: I have reviewed all pertinent imaging results/findings within the past 24 hours.  CXR: I have reviewed all pertinent results/findings within the past 24 hours and my personal findings are:  persistent multifocal pneumonia     Assessment and Plan     * Multifocal pneumonia  Patient has a diagnosis of pneumonia. The cause of the pneumonia is viral in etiology due to  rhinovirus . The pneumonia is improving. The patient has the following signs/symptoms of pneumonia: cough, sputum production, and shortness of breath. The patient does have a current oxygen requirement and the patient does not have a home oxygen requirement. I have reviewed the pertinent imaging. The following cultures have been collected: Blood cultures The culture results are listed below.     Current antimicrobial regimen consists of the antibiotics listed below. Will monitor patient closely and continue current treatment plan unchanged.    Antibiotics (From admission, onward)      Start     Stop Route Frequency Ordered    01/25/25 2100  doxycycline tablet 100 mg         -- Oral Every 12 hours 01/25/25 1809    01/25/25 1400  cefTRIAXone injection 1 g         -- IV Every 24 hours (non-standard times) 01/24/25 2351            Microbiology Results (last 7 days)       Procedure Component Value Units Date/Time    Blood culture x two cultures. Draw prior to antibiotics. [[id number]] Collected: 01/24/25 1250     Order Status: Completed Specimen: Blood from Peripheral, Forearm, Left Updated: 01/29/25 0612     Blood Culture, Routine No Growth to date      No Growth to date      No Growth to date      No Growth to date      No Growth to date    Narrative:      Aerobic and anaerobic    Blood culture x two cultures. Draw prior to antibiotics. [[id number]] Collected: 01/24/25 1256    Order Status: Completed Specimen: Blood from Peripheral, Antecubital, Right Updated: 01/29/25 0612     Blood Culture, Routine No Growth to date      No Growth to date      No Growth to date      No Growth to date      No Growth to date    Narrative:      Aerobic and anaerobic    Culture, Respiratory with Gram Stain [[id number]] Collected: 01/27/25 1919    Order Status: Completed Specimen: Respiratory from Sputum Updated: 01/28/25 0629     Gram Stain (Respiratory) <10 epithelial cells per low power field.     Gram Stain (Respiratory) Rare WBC's     Gram Stain (Respiratory) Rare yeast    Respiratory Infection Panel (PCR), Nasopharyngeal [[id number]]  (Abnormal) Collected: 01/25/25 1406    Order Status: Completed Specimen: Nasopharyngeal Swab Updated: 01/27/25 1211     Respiratory Infection Panel Source NP Swab     Adenovirus Not Detected     Coronavirus 229E, Common Cold Virus Not Detected     Coronavirus HKU1, Common Cold Virus Not Detected     Coronavirus NL63, Common Cold Virus Not Detected     Coronavirus OC43, Common Cold Virus Not Detected     Comment: The Coronavirus strains detected in this test cause the common cold.  These strains are not the COVID-19 (novel Coronavirus)strain   associated with the respiratory disease outbreak.          SARS-CoV2 (COVID-19) Qualitative PCR Not Detected     Human Metapneumovirus Not Detected     Human Rhinovirus/Enterovirus Detected     Influenza A (subtypes H1, H1-2009,H3) Not Detected     Influenza B Not Detected     Parainfluenza Virus 1 Not Detected     Parainfluenza Virus 2 Not Detected     Parainfluenza  Virus 3 Not Detected     Parainfluenza Virus 4 Not Detected     Respiratory Syncytial Virus Not Detected     Bordetella Parapertussis (WU4509) Not Detected     Bordetella pertussis (ptxP) Not Detected     Chlamydia pneumoniae Not Detected     Mycoplasma pneumoniae Not Detected     Comment: Respiratory Infection Panel testing performed by Multiplex PCR.       Narrative:      Assay not valid for lower respiratory specimens, alternate  testing required.        Worsening   Abg reviewed   Blood culture negative growth to date x 4 days  On vapotherm   Add pulmicort and systemic steroids continued   Has incentive spirometer and aerobika   Physical/occupational therapy recommending homehealth physical/occupational therapy     1/29/25: On 1/28/25, respiratory status worsened requiring 80% Vapotherm. IV Lasix added. Blood cultrues show NGTD. Mycoplasm, legionella, fungitell ordered. Sputum cultures pending. +Mycoplasm IgG, IgM negative.   +SASKIA, elevated CRP and ESR. Complements normal. dsDNA ab normal. Pulmonology recommended Rheumatology review case. Per Dr. Pfeiffer-low suspicion for connective tissue disease based on these labs and reported symptoms. No additional medication recommendations at this point. He recommended to follow up on the SASKIA profile and reach back out him if anything in the profile is positive and at that point I can set her up for outpatient follow up (or external referral for faster Rheumatology access).   On 1/29/25, Speech recommends MBSS in am     Acute hypoxemic respiratory failure  Patient with Hypoxic Respiratory failure which is Acute.  she is not on home oxygen. Supplemental oxygen was provided and noted- Oxygen Concentration (%):  [70-85] 70  Signs/symptoms of respiratory failure include- tachypnea, increased work of breathing, and respiratory distress. Contributing diagnoses includes - Pneumonia Labs and images were reviewed. Patient Has not had a recent ABG. Will treat underlying causes and  "adjust management of respiratory failure as follows- continue current regimen      On vapotherm 2/2 persistent pneumonia   Treatment adjusted to include systemic steroids, IV Abx, IV lasix, and neb txs   Wean as able  Pulmonology following    Rhinovirus infection  IV steroids and neb txs    Prolonged Q-T interval on ECG  Discontinue azithromycin  Add doxycycline     History of CVA (cerebrovascular accident)  -continue ASA, plavix, and lipitor  Thrombocytopenia noted   Continue plavix  Discontinue lovenox    1/29/25: thrombocytopenia resolved- will add Lovenox ppx    Hyperglycemia  Patient's FSGs are controlled on current medication regimen.  Last A1c reviewed-   Lab Results   Component Value Date    HGBA1C 5.0 01/25/2025     Most recent fingerstick glucose reviewed- No results for input(s): "POCTGLUCOSE" in the last 24 hours.    Current correctional scale  Low  Maintain anti-hyperglycemic dose as follows-   Antihyperglycemics (From admission, onward)      None        No History of diabetes.  Plan:  -SSI  -A1c  -Accu-checks  -Hypoglycemic protocol      On systemic steroids with poor po intake  NISS  Continue monitoring        Other specified hypothyroidism  Patient has chronic hypothyroidism. TFTs reviewed-   Lab Results   Component Value Date    TSH 6.52 (H) 03/20/2024   Continue home medication(s)   Follow up outpatient       Essential hypertension  Chronic, controlled.  Latest blood pressure and vitals reviewed-   Temp:  [97.6 °F (36.4 °C)-98.9 °F (37.2 °C)]   Pulse:  []   Resp:  [16-22]   BP: (112-191)/(53-83)   SpO2:  [77 %-100 %] .   Home meds for hypertension were reviewed and noted below.   Hypertension Medications               metoprolol succinate (TOPROL-XL) 50 MG 24 hr tablet Take 1 tablet by mouth once daily.            While in the hospital, will manage blood pressure as follows; Continue home antihypertensive regimen    Will utilize p.r.n. blood pressure medication only if patient's blood " pressure greater than  180/110 and she develops symptoms such as worsening chest pain or shortness of breath.      GERD (gastroesophageal reflux disease)  Chronic. Stable. Currently asymptomatic. Home medications include PPI/Antacids as needed.  Plan:  -Continue PPI/Antacids as needed         Anxiety and depression  Chronic. Stable. Not in acute exacerbation and currently denies endorsing any suicidal/homicidal ideations.   Plan:  Received xanax last night  -Continue home medications (xanax and zoloft)        VTE Risk Mitigation (From admission, onward)           Ordered     IP VTE HIGH RISK PATIENT  Once         01/24/25 2354     Place sequential compression device  Until discontinued         01/24/25 2354                    Discharge Planning   HERNANDO:      Code Status: Full Code   Medical Readiness for Discharge Date:   Discharge Plan A: Home, Home with family                Please place Justification for DME        Esther Richardson NP  Department of Hospital Medicine   [facility] ([facility])

## 2025-01-29 NOTE — SUBJECTIVE & OBJECTIVE
Interval History: pt feels some improvement. Currently on 70% vapotherm. +thick sputum. +generalized weakness and fatigue. Recommended OOB, IS.       Review of Systems   Constitutional:  Positive for activity change, appetite change and fatigue.   HENT:  Positive for trouble swallowing.    Respiratory:  Positive for cough and shortness of breath.    Cardiovascular:  Negative for palpitations.   Gastrointestinal:  Negative for abdominal pain, constipation, nausea and vomiting.   Neurological:  Positive for weakness.   Psychiatric/Behavioral:  Positive for decreased concentration and dysphoric mood. Negative for agitation, behavioral problems and confusion. The patient is not nervous/anxious.      Objective:     Vital Signs (Most Recent):  Temp: 97.7 °F (36.5 °C) (01/29/25 0836)  Pulse: 73 (01/29/25 0836)  Resp: 18 (01/29/25 0836)  BP: (!) 114/55 (01/29/25 0836)  SpO2: (!) 92 % (01/29/25 0836) Vital Signs (24h Range):  Temp:  [97.7 °F (36.5 °C)-98.4 °F (36.9 °C)] 97.7 °F (36.5 °C)  Pulse:  [66-97] 73  Resp:  [18-20] 18  SpO2:  [90 %-97 %] 92 %  BP: (105-118)/(51-59) 114/55     Weight: 74.4 kg (164 lb 0.4 oz)  Body mass index is 26.47 kg/m².    Intake/Output Summary (Last 24 hours) at 1/29/2025 1120  Last data filed at 1/29/2025 0637  Gross per 24 hour   Intake 1600 ml   Output 1365 ml   Net 235 ml         Physical Exam  Vitals and nursing note reviewed.   Constitutional:       General: She is not in acute distress.     Appearance: She is ill-appearing. She is not toxic-appearing.   HENT:      Head: Normocephalic and atraumatic.      Comments: Edentulous   Cardiovascular:      Rate and Rhythm: Normal rate and regular rhythm.   Pulmonary:      Effort: Tachypnea and accessory muscle usage present. No respiratory distress.      Breath sounds: Decreased air movement present. Rhonchi and rales present.      Comments: +conversational dyspnea   Vapotherm in place   Abdominal:      Palpations: Abdomen is soft.      Tenderness:  There is no abdominal tenderness.   Skin:     General: Skin is warm.      Coloration: Skin is pale.   Neurological:      Mental Status: She is alert and oriented to person, place, and time.      Motor: Weakness present.   Psychiatric:         Mood and Affect: Mood normal.         Speech: Speech normal.         Behavior: Behavior is cooperative.             Significant Labs: All pertinent labs within the past 24 hours have been reviewed.  ABGs:   Recent Labs   Lab 01/28/25  0911   PH 7.472*   PCO2 31.8*   HCO3 23.3*   POCSATURATED 80   BE 0   PO2 41*     CBC:   Recent Labs   Lab 01/28/25  0435 01/29/25  0821   WBC 13.41* 9.27   HGB 14.1 12.2   HCT 42.5 36.6*    151     CMP:   Recent Labs   Lab 01/29/25  0821      K 4.1      CO2 28   *   BUN 22   CREATININE 0.6   CALCIUM 9.5   PROT 5.4*   ALBUMIN 2.2*   BILITOT 0.5   ALKPHOS 99   AST 36   ALT 49*   ANIONGAP 8     Respiratory culture growing rare yeast, rare wbcs  Ginny positive   Complement within normal limits     Significant Imaging: I have reviewed all pertinent imaging results/findings within the past 24 hours.  CXR: I have reviewed all pertinent results/findings within the past 24 hours and my personal findings are:  persistent multifocal pneumonia

## 2025-01-29 NOTE — ASSESSMENT & PLAN NOTE
Patient with Hypoxic Respiratory failure which is Acute.  she is not on home oxygen. Supplemental oxygen was provided and noted- Oxygen Concentration (%):  [70-85] 70  Signs/symptoms of respiratory failure include- tachypnea, increased work of breathing, and respiratory distress. Contributing diagnoses includes - Pneumonia Labs and images were reviewed. Patient Has not had a recent ABG. Will treat underlying causes and adjust management of respiratory failure as follows- continue current regimen      On vapotherm 2/2 persistent pneumonia   Treatment adjusted to include systemic steroids, IV Abx, IV lasix, and neb txs   Wean as able  Pulmonology following

## 2025-01-29 NOTE — SUBJECTIVE & OBJECTIVE
Objective:     Vital Signs (Most Recent):  Temp: 97.8 °F (36.6 °C) (01/29/25 1252)  Pulse: 74 (01/29/25 1415)  Resp: 18 (01/29/25 1415)  BP: (!) 116/56 (01/29/25 1252)  SpO2: (!) 93 % (01/29/25 1415) Vital Signs (24h Range):  Temp:  [97.7 °F (36.5 °C)-98.3 °F (36.8 °C)] 97.8 °F (36.6 °C)  Pulse:  [66-84] 74  Resp:  [18-20] 18  SpO2:  [90 %-96 %] 93 %  BP: (105-116)/(51-56) 116/56     Weight: 74.4 kg (164 lb 0.4 oz)  Body mass index is 26.47 kg/m².  Intake/Output Summary (Last 24 hours) at 1/29/2025 1426  Last data filed at 1/29/2025 1327  Gross per 24 hour   Intake 2000 ml   Output 265 ml   Net 1735 ml     Physical Exam  Vitals reviewed.   Constitutional:       General: She is not in acute distress.  HENT:      Head: Normocephalic.   Eyes:      Conjunctiva/sclera: Conjunctivae normal.      Pupils: Pupils are equal, round, and reactive to light.   Cardiovascular:      Pulses: Normal pulses.   Pulmonary:      Effort: Pulmonary effort is normal.      Breath sounds: Rales present. No wheezing.   Skin:     General: Skin is warm.      Capillary Refill: Capillary refill takes less than 2 seconds.      Coloration: Skin is not jaundiced.      Findings: No bruising.   Neurological:      Mental Status: She is alert and oriented to person, place, and time.   Psychiatric:         Mood and Affect: Mood normal.     Review of Systems   Constitutional:  Negative for chills, fatigue and fever.   Respiratory:  Positive for cough and shortness of breath.    Cardiovascular:  Negative for chest pain.   Gastrointestinal:  Negative for abdominal pain, nausea and vomiting.   Genitourinary:  Negative for dysuria.   Neurological:  Negative for dizziness and headaches.   Psychiatric/Behavioral:  Negative for agitation and confusion.      Vents:  Oxygen Concentration (%): 60 (01/29/25 1415)    Lines/Drains/Airways       Peripheral Intravenous Line  Duration                  Peripheral IV - Single Lumen 01/29/25 0925 22 G No Right Antecubital  <1 day                  Significant Labs:    CBC/Anemia Profile:  Recent Labs   Lab 01/28/25  0435 01/29/25  0821   WBC 13.41* 9.27   HGB 14.1 12.2   HCT 42.5 36.6*    151   MCV 88 88   RDW 13.5 13.2     Chemistries:  Recent Labs   Lab 01/29/25  0821      K 4.1      CO2 28   BUN 22   CREATININE 0.6   CALCIUM 9.5   ALBUMIN 2.2*   PROT 5.4*   BILITOT 0.5   ALKPHOS 99   ALT 49*   AST 36     Significant Imaging:  I have reviewed all pertinent imaging results/findings within the past 24 hours.  I have reviewed and interpreted all pertinent imaging results/findings within the past 24 hours.

## 2025-01-29 NOTE — PT/OT/SLP PROGRESS
"Physical Therapy  Treatment    Linda Segura   MRN: [id number]   Admitting Diagnosis: Multifocal pneumonia    PT Received On: 01/29/25  PT Start Time: 0935     PT Stop Time: 1000    PT Total Time (min): 25 min       Billable Minutes:  Therapeutic Activity 15 and Therapeutic Exercise 10    Treatment Type: Treatment  PT/PTA: PT     Number of PTA visits since last PT visit: 0       General Precautions: Standard, aspiration, respiratory  Orthopedic Precautions: N/A  Braces: N/A  Respiratory Status:  VAPOTHERM         Subjective:  Communicated with NURSE AND EPIC CHART REVIEW  prior to session.   PT AGREED TO TX     Pain/Comfort  Pain Rating 1: 3/10  Location 1: throat  Pain Rating Post-Intervention 1: 3/10    Objective:   Patient found with: peripheral IV, telemetry, pulse ox (continuous), oxygen, Other (comments) (VAPOTHERM)    Functional Mobility:  PT MET IN RM SUP IN BED. PT LOG ROLLED TO LEFT AND SEATED EOB WITH CGA AND HOB ELEVATED. PT SCOOTED IN BED WITH CGA. GT. BELT AND  SOCKS DONNED PRIOR TO OOB MOBILITY.  PT STOOD WITH HHA > CGA FOR T/F TO CHAIR WITH O2 IN TOW. PT SEATED FOR REST BREAK. PT EDUCATED ON PURSED LIP BREATHING.     Treatment and Education:  PT COMPLETED B LE TE X 10 REPS OF AP, TKE AND MIP SEATED IN CHAIR WITH REST  BREAKS BETWEEN. PT O2 SATS MAINTAINED BETWEEN 92-95% DURING TE. PT LEFT SEATED FOR OOB TOLERANCE.  PT EDUCATED TO SIT FOR ALL MEALS AND PT EDUCATED ON "CALL DON'T FALL", ENCOURAGED TO CALL FOR ASSISTANCE WITH ALL NEEDS FOR OOB MOBILITY.       AM-PAC 6 CLICK MOBILITY  How much help from another person does this patient currently need?   1 = Unable, Total/Dependent Assistance  2 = A lot, Maximum/Moderate Assistance  3 = A little, Minimum/Contact Guard/Supervision  4 = None, Modified [place]/Independent    Turning over in bed (including adjusting bedclothes, sheets and blankets)?: 3  Sitting down on and standing up from a chair with arms (e.g., wheelchair, bedside commode, etc.): " 3  Moving from lying on back to sitting on the side of the bed?: 3  Moving to and from a bed to a chair (including a wheelchair)?: 3  Need to walk in hospital room?: 1  Climbing 3-5 steps with a railing?: 1  Basic Mobility Total Score: 14    AM-PAC Raw Score CMS G-Code Modifier Level of Impairment Assistance   6 % Total / Unable   7 - 9 CM 80 - 100% Maximal Assist   10 - 14 CL 60 - 80% Moderate Assist   15 - 19 CK 40 - 60% Moderate Assist   20 - 22 CJ 20 - 40% Minimal Assist   23 CI 1-20% SBA / CGA   24 CH 0% Independent/ Mod I     Patient left up in chair with call button in reach.    Assessment:  PT GARY TX WELL. MOBILITY AND TE INC PT O2 SATS DURING THIS TX SESSION    Rehab identified problem list/impairments: weakness, impaired endurance, impaired cardiopulmonary response to activity, impaired balance, pain, gait instability, impaired functional mobility, impaired self care skills    Rehab potential is good.    Activity tolerance: Fair    Discharge recommendations: Low Intensity Therapy      Barriers to discharge:      Equipment recommendations: none     GOALS:   Multidisciplinary Problems       Physical Therapy Goals          Problem: Physical Therapy    Goal Priority Disciplines Outcome Interventions   Physical Therapy Goal     PT, PT/OT     Description: LT/10/25  1. PT WILL COMPLETE BED MOBILITY IND  2. PT WILL STAND T/F TO CHAIR WITH RW MOD I  3. PT WILL GT TRAIN X 150' WITH RW MOD I TO PROGRESS GT.  4. PT WILL INC AMPAC SCORE BY 2 POINTS TO PROGRESS GROSS FUNC MOBILITY.                          DME Justifications:  No DME recommended requiring DME justifications    PLAN:    Patient to be seen 3 x/week to address the above listed problems via therapeutic activities, therapeutic exercises  Plan of Care expires: 02/10/25  Plan of Care reviewed with: patient         2025

## 2025-01-29 NOTE — ASSESSMENT & PLAN NOTE
"Patient's FSGs are controlled on current medication regimen.  Last A1c reviewed-   Lab Results   Component Value Date    HGBA1C 5.0 01/25/2025     Most recent fingerstick glucose reviewed- No results for input(s): "POCTGLUCOSE" in the last 24 hours.    Current correctional scale  Low  Maintain anti-hyperglycemic dose as follows-   Antihyperglycemics (From admission, onward)      None        No History of diabetes.  Plan:  -SSI  -A1c  -Accu-checks  -Hypoglycemic protocol      On systemic steroids with poor po intake  NISS  Continue monitoring      "

## 2025-01-29 NOTE — PT/OT/SLP EVAL
"Speech Language Pathology Re-Evaluation  Bedside Swallow    Patient Name:  Linda Segura   MRN:  [id number]  Admitting Diagnosis: Multifocal pneumonia    Recommendations:                 General Recommendations:  Modified barium swallow study  Diet recommendations:  NPO, NPO except for medications taken crushed/whole in puree  Aspiration Precautions: Feed only when awake/alert, Frequent oral care, HOB to 90 degrees, Meds crushed in puree, Meds whole buried in puree, and Strict aspiration precautions   General Precautions: Standard, aspiration  Communication strategies:  none    Assessment:     Linda Segura is a 74 y.o. female with a PMHx significant for cancer of tongue (2007), CVA, GERD, HTN, and mental disorder who presented to the ED s/t cough and generalized weakness. She reports intermittent globus sensation at home > w solids. She endorses eating soft and bite sized solids at baseline as she is edentulous. Pt reports she "does not like to sit up to eat," and prefers to lay in her recliner. At bedside she is appreciated w functional OM and cognitive-linguistic ability to meet acute wants/needs. Initial evaluation significant for no overt s/s of aspiration throughout and pt recommended for soft and bite sized solids (IDDSI 6) with thin liquids (IDDSI 0). ST re-consulted this AM s/t respiratory decline and difficulty swallowing medication this AM. Pt reports that 1/2 of pill taken in thin liquid (IDDSI 0), got stuck, ended up going down, and now she reports "tingling sensation in throat". She also now endorses odynophagia. Bedside CSE today significant for intermittent overt s/s of aspiration, decline from initial assessment. ST recommends MBSS to be completed for instrumental assessment of swallow safety/efficiency. Pt is also recommended to remain NPO except for medications taken crushed in puree or whole in puree at this time. ST educated pt and family on recommendations and pt agreeable to MBSS. MBSS to " be completed bedside tomorrow as pt on vapotherm and unable to be completed in radiology. ST will follow to complete MBSS.    History:     Past Medical History:   Diagnosis Date    Abnormal Pap smear of vagina     Anxiety     Anxiety and depression     Cancer     tongue    CVA (cerebral vascular accident)     Essential hypertension 6/12/2018    Mental disorder     Other specified hypothyroidism 2/13/2021    Scoliosis        Past Surgical History:   Procedure Laterality Date    BLADDER SURGERY      CERVICAL SPINE SURGERY      colpocleisis  04/2018    Dr. Strong    feet Bilateral     HYSTERECTOMY      severe endometriosis    KNEE SURGERY      OOPHORECTOMY Bilateral     severe endometriosis       Social History: Patient lives with her daughter in [place], [place].     Prior Intubation HX:  NA     Modified Barium Swallow: NA     XR CHEST 1 VIEW on 01/28/2025:     FINDINGS:  Comparison is made to CT chest January 25, 2025 as well as chest radiograph January 24th 2025.     Multifocal bilateral pneumonia similar to comparison chest CT given differences in modality.  No pneumothorax.  No significant pleural effusion.  Mediastinal silhouette is within number limits.  ACDF.  No acute osseous finding.     Impression:  Persistent multifocal pneumonia.     Electronically signed by:Kwame Rowland  Date:                                            01/28/2025  Time:                                           10:27    CT CHEST WITH CONTRAST on 01/25/2025:     FINDINGS:  Patchy areas of airspace disease predominantly in the peripheral lung and subpleural region may relate to pneumonic process.  No evidence for pulmonary embolism.  Mild cardiomegaly.  No evidence for aortic dissection.  Moderate motion artifacts.  Trace left-sided pleural effusion.  Fatty infiltration liver.  Inhomogeneous enhancement of the spleen may relate to timing of contrast.  Tracheobronchial tree is patent.  Coronary artery calcification.  Cervical hardware.      Impression:     As above     Electronically signed by:uLis Enrique Crowe  Date:                                            01/25/2025  Time:                                           20:05     Prior diet: soft and bite sized solids. Easy to chew.     Occupation/hobbies/homemaking: Pt's children assist her with transportation, medication management, and ADLs.    Subjective     Pt seen sitting in bedside chair with daughter present at bedside for ST re-assessment  Patient goals: to improve SOB     Pain/Comfort:  Pain Rating 1: 0/10  Pain Rating Post-Intervention 1: 0/10  Pain Rating 2: 0/10  Pain Rating Post-Intervention 2: 0/10    Respiratory Status:  Vapotherm    Objective:     Oral Musculature Evaluation  Oral Musculature: WFL  Dentition: edentulous  Secretion Management: adequate  Mucosal Quality: sticky  Mandibular Strength and Mobility: WFL  Oral Labial Strength and Mobility: WFL  Lingual Strength and Mobility: WFL  Velar Elevation: WFL  Buccal Strength and Mobility: WFL  Volitional Cough: present  Volitional Swallow: apprecaited  Voice Prior to PO Intake: clear    Bedside Swallow Eval:   Clinical Swallow Examination:   Of note, patient self-fed throughout evaluation. Patient presented with:      CONSISTENCY   NOTES   THIN (IDDSI 0) Cup/straw sips    Intermittent delayed throat clear/ weak cough appreciated     PUREE (IDDSI 4/Extremely Thick)    TSP/TBSP bites of pudding Throat clear post deglutition of large impulsive bite.     With cued small/single bite no overt s/s of aspiration appreciated        Thickened liquids were not used in this assessment. Cele (2018) reported that thickened liquids have no sound evidence at reducing the risk of pneumonia in patients with dysphagia and can cause harm by increasing their risk of dehydration. It also presents an increased risk of UTI, electrolyte imbalance, constipation, fecal impaction, cognitive impairment, functional decline and even death (Wilmer, 2002; Bina,  2016).  Thickened liquids are associated with risks including dehydration, increased pharyngeal residue, potential interference with medication absorption, and decreased quality of life (Sahara, 2013). Thickened liquids are also more likely to be silently aspirated than thin liquids (Jarrod et al., 2018). This supports the assertion that we should confirm a patient requires thickened liquids with an instrumental swallow study prior to recommending them.     References:   Sahara DASH (2013). Thickening agents used for dysphagia management: Effect on bioavailability of water, medication and feelings of satiety. Nutrition Journal, 12, 54. https://doi.org/10.1186/5893-2741-08-54     IRASEMA Garay., XUAN Atkins, HAIR Watkins, & HARDY Griffin (2018). Cough response to aspiration in thin and thick fluids during FEES in hospitalized inpatients. International journal of language & communication disorders, 53(5), 909-918. https://doi.org/10.1111/9802-0461.29888     INTERPRETATION AND RISK ASSESSMENT:  Clinical swallow evaluation (CSE) revealed oral phase characterized by lingual, labial, and buccal strength and range of motion adequate for lip closure, bolus preparation and propulsion. The patient had no anterior loss of the bolus with complete closure of the lips around the utensils. Trace residue remained in the oral cavity following the swallow, cleared w cued liquid wash. Patient with intermittent overt clinical signs/symptoms of aspiration. Patient presents with a possibly inefficient swallow as indicated by edentulous oral cavity. Patient reported globus sensation on trials of solid (IDDSI 7). Contributing risk factors for dysphagia include deficits in respiratory-swallow coordination. Patient with increased risk for silent aspiration given  hx of tongue cancer w radiation (2007) . ST recommends MBSS for further instrumental assessment of swallow safety/efficiency.     Goals:   Multidisciplinary Problems       SLP Goals           Problem: SLP    Goal Priority Disciplines Outcome   SLP Goal     SLP    Description: TPW engage MBSS for instrumental assessment of swallow safety/efficiency.     TPW safely consume least restrictive PO diet.                       Plan:     Patient to be seen:  2 x/week, 3 x/week   Plan of Care expires:  02/05/25  Plan of Care reviewed with:  patient, daughter   SLP Follow-Up:  Yes       Discharge recommendations:   (pending acute progress)   Barriers to Discharge:  None    Time Tracking:     SLP Treatment Date:   01/29/25  Speech Start Time:  0951  Speech Stop Time:  1005     Speech Total Time (min):  14 min    Billable Minutes: Eval Swallow and Oral Function 14    Matilda Parsons MA, PL-SLP, CCC-SLP  Speech Language Pathologist  01/29/2025

## 2025-01-29 NOTE — PLAN OF CARE
Bed mobility CGA  Sit to stand HHA CGA  Stand pivot transfer to bedside chair CGA    Recommend low intensity therapy at DC    20-Dec-2017

## 2025-01-29 NOTE — ASSESSMENT & PLAN NOTE
- Patient with Hypoxic Respiratory failure which is Acute. She is not on home oxygen. Supplemental oxygen was provided and noted- Oxygen Concentration (%):  [60-80] 60  - Signs/symptoms of respiratory failure include- increased work of breathing and respiratory distress. Contributing diagnoses includes - Pneumonia Labs and images were reviewed. Patient Has not had a recent ABG. Will treat underlying causes and adjust management of respiratory failure as follows-   - Currently on 4 liters/minute oxygen, continue to wean as able  - Continue scheduled nebs  - Empiric CAP coverage, sputum cx if able  - Monitor for worsening fevers, WBC; CXR if worsening respiratory status  - + SASKIA screen, elevated CRP/ESR; complements pending  - 1/28: see pneumonia

## 2025-01-29 NOTE — PT/OT/SLP PROGRESS
Occupational Therapy   Treatment    Name: Linda Segura  MRN: [id number]  Admitting Diagnosis:  Multifocal pneumonia       Recommendations:     Discharge Recommendations: Low Intensity Therapy  Discharge Equipment Recommendations:  none  Barriers to discharge:  None    Assessment:     Linda Segura is a 74 y.o. female with a medical diagnosis of Multifocal pneumonia. Performance deficits affecting function are weakness, gait instability, decreased upper extremity function, impaired cardiopulmonary response to activity, decreased lower extremity function, impaired balance, impaired endurance, decreased safety awareness, pain, impaired self care skills, impaired functional mobility.     Rehab Prognosis:  Good; patient would benefit from acute skilled OT services to address these deficits and reach maximum level of function.       Plan:     Patient to be seen 2 x/week to address the above listed problems via self-care/home management, therapeutic activities, therapeutic exercises  Plan of Care Expires: 02/10/25  Plan of Care Reviewed with: patient    Subjective     Chief Complaint: Shortness of breath   Patient/Family Comments/goals: Increase independence  Pain/Comfort:  Pain Rating 1: 3/10  Location 1: throat  Pain Addressed 1: Nurse notified  Pain Rating Post-Intervention 1: 3/10    Objective:     Communicated with: Nurse prior to session.  Patient found supine with telemetry, peripheral IV, oxygen, pulse ox (continuous) (vapotherm) upon OT entry to room.    General Precautions: Standard, fall, droplet, respiratory, contact    Orthopedic Precautions:N/A  Braces: N/A  Respiratory Status:  vapotherm     Occupational Performance:     Bed Mobility:    Patient completed Rolling/Turning to Left with  contact guard assistance  Patient completed Scooting/Bridging with contact guard assistance  Patient completed Supine to Sit with contact guard assistance     Functional Mobility/Transfers:  Patient completed Sit <> Stand  Transfer with contact guard assistance  with  hand-held assist   Patient completed Bed <> Chair Transfer using Stand Pivot technique with contact guard assistance with hand-held assist  Functional Mobility: Unable due to vapotherm     AMPAC 6 Click ADL: 17    Treatment & Education:  Pt transferred to bedside chair CGA. She performed BUE AROM exercises x10 reps to increase functional endurance and strength needed for daily activities. Pt encouraged to continue these exercises throughout the day to further improve functional strength and endurance.   Oxygen levels remained above 90% throughout exercise.  Pt educated on call don't fall policy and to utilize call button for nursing assistance when ready to return to bed. Pt also encouraged to sit up in chair for at least all three meals to increase functional endurance and prevent muscle atrophy. Pt verbalized understanding.    Patient left up in chair with all lines intact, call button in reach, chair alarm on, and family member present    GOALS:   Multidisciplinary Problems       Occupational Therapy Goals          Problem: Occupational Therapy    Goal Priority Disciplines Outcome Interventions   Occupational Therapy Goal     OT, PT/OT Progressing    Description: Goals to be met by: 2/10/25     Patient will increase functional independence with ADLs by performing:    Toileting from toilet with [place] for hygiene and clothing management.   Toilet transfer to toilet with [place].                       Time Tracking:     OT Date of Treatment: 01/29/25  OT Start Time: 0930  OT Stop Time: 0955  OT Total Time (min): 25 min    Billable Minutes:Therapeutic Activity 15  Therapeutic Exercise 10    PARADISE [place]  OT/ADARSH: OT     Number of ADARSH visits since last OT visit: 0    1/29/2025

## 2025-01-29 NOTE — PROGRESS NOTES
"[facility] - Telemetry ([facility])  Pulmonology  Progress Note    Patient Name: Linda Segura  MRN: [id number]  Admission Date: 1/24/2025  Hospital Length of Stay: 5 days  Code Status: Full Code  Attending Provider: Hayde Medina MD  Primary Care Provider: Rolly Hammond MD   Principal Problem: Multifocal pneumonia    Subjective:   Linda Segura is 74 y.o.   Asked to see for abn CXR and chest CT  Daughter bedside  Seen in urgent care   2-3 days cough weakness fever 101 chills  Nonproductive sputum   Denies occupation exposures   No prior history of illness   No prior outpatient treatment   Never smoker   Retired  She is currently on 4 L of oxygen   Past medical history anxiety depression CVA hypertension hypothyroidism.    Notable labs , lactate 3.7, flu COVID negative.  X-ray reviewed   Chest CT reviewed   Currently on ceftriaxone and doxycycline.    Culture data unrevealing so far     01/27/2025: Feeling a little better today. Still complains of some exertional dyspnea and pleuritic chest pain worse with coughing fits. Productive cough with "thick, brown" sputum. 4L NC. Using Acapella.     01/28/2025: This am abg done on high flow so switched to vapotherm. Steroids started as well.    01/29/2025: Weaning vapotherm down to 25/60. Receiving lasix and good UOP    Objective:     Vital Signs (Most Recent):  Temp: 97.8 °F (36.6 °C) (01/29/25 1252)  Pulse: 74 (01/29/25 1415)  Resp: 18 (01/29/25 1415)  BP: (!) 116/56 (01/29/25 1252)  SpO2: (!) 93 % (01/29/25 1415) Vital Signs (24h Range):  Temp:  [97.7 °F (36.5 °C)-98.3 °F (36.8 °C)] 97.8 °F (36.6 °C)  Pulse:  [66-84] 74  Resp:  [18-20] 18  SpO2:  [90 %-96 %] 93 %  BP: (105-116)/(51-56) 116/56     Weight: 74.4 kg (164 lb 0.4 oz)  Body mass index is 26.47 kg/m².  Intake/Output Summary (Last 24 hours) at 1/29/2025 1426  Last data filed at 1/29/2025 1327  Gross per 24 hour   Intake 2000 ml   Output 265 ml   Net 1735 ml     Physical Exam  Vitals reviewed. "   Constitutional:       General: She is not in acute distress.  HENT:      Head: Normocephalic.   Eyes:      Conjunctiva/sclera: Conjunctivae normal.      Pupils: Pupils are equal, round, and reactive to light.   Cardiovascular:      Pulses: Normal pulses.   Pulmonary:      Effort: Pulmonary effort is normal.      Breath sounds: Rales present. No wheezing.   Skin:     General: Skin is warm.      Capillary Refill: Capillary refill takes less than 2 seconds.      Coloration: Skin is not jaundiced.      Findings: No bruising.   Neurological:      Mental Status: She is alert and oriented to person, place, and time.   Psychiatric:         Mood and Affect: Mood normal.     Review of Systems   Constitutional:  Negative for chills, fatigue and fever.   Respiratory:  Positive for cough and shortness of breath.    Cardiovascular:  Negative for chest pain.   Gastrointestinal:  Negative for abdominal pain, nausea and vomiting.   Genitourinary:  Negative for dysuria.   Neurological:  Negative for dizziness and headaches.   Psychiatric/Behavioral:  Negative for agitation and confusion.      Vents:  Oxygen Concentration (%): 60 (01/29/25 1415)    Lines/Drains/Airways       Peripheral Intravenous Line  Duration                  Peripheral IV - Single Lumen 01/29/25 0925 22 G No Right Antecubital <1 day                  Significant Labs:    CBC/Anemia Profile:  Recent Labs   Lab 01/28/25  0435 01/29/25  0821   WBC 13.41* 9.27   HGB 14.1 12.2   HCT 42.5 36.6*    151   MCV 88 88   RDW 13.5 13.2     Chemistries:  Recent Labs   Lab 01/29/25  0821      K 4.1      CO2 28   BUN 22   CREATININE 0.6   CALCIUM 9.5   ALBUMIN 2.2*   PROT 5.4*   BILITOT 0.5   ALKPHOS 99   ALT 49*   AST 36     Significant Imaging:  I have reviewed all pertinent imaging results/findings within the past 24 hours.  I have reviewed and interpreted all pertinent imaging results/findings within the past 24 hours.    ABG  Recent Labs   Lab  "01/28/25  0911   PH 7.472*   PO2 41*   PCO2 31.8*   HCO3 23.3*   BE 0     Assessment/Plan:     Pulmonary  * Multifocal pneumonia  - Patient has a diagnosis of pneumonia. The cause of the pneumonia is suspected to be bacterial in etiology but organism is not known. The pneumonia is stable. The patient has the following signs/symptoms of pneumonia: persistent hypoxia  and cough. The patient does have a current oxygen requirement and the patient does have a home oxygen requirement. I have reviewed the pertinent imaging. The following cultures have been collected: Blood cultures and Sputum culture The culture results are listed below.   - Current antimicrobial regimen consists of the antibiotics listed below. Will monitor patient closely and continue current treatment plan unchanged.  - Now with productive cough; "thick, brown" sputum - collect sputum cx  - Continue rocephin + doxy  - Mycoplasm, legionella, fungitell pending    Antibiotics (From admission, onward)      Start     Stop Route Frequency Ordered    01/25/25 2100  doxycycline tablet 100 mg         -- Oral Every 12 hours 01/25/25 1809    01/25/25 1400  cefTRIAXone injection 1 g         -- IV Every 24 hours (non-standard times) 01/24/25 2351            Microbiology Results (last 7 days)       Procedure Component Value Units Date/Time    Blood culture x two cultures. Draw prior to antibiotics. [[id number]] Collected: 01/24/25 1250    Order Status: Completed Specimen: Blood from Peripheral, Forearm, Left Updated: 01/29/25 0612     Blood Culture, Routine No Growth to date      No Growth to date      No Growth to date      No Growth to date      No Growth to date    Narrative:      Aerobic and anaerobic    Blood culture x two cultures. Draw prior to antibiotics. [[id number]] Collected: 01/24/25 1256    Order Status: Completed Specimen: Blood from Peripheral, Antecubital, Right Updated: 01/29/25 0612     Blood Culture, Routine No Growth to date      No Growth to " date      No Growth to date      No Growth to date      No Growth to date    Narrative:      Aerobic and anaerobic    Culture, Respiratory with Gram Stain [[id number]] Collected: 01/27/25 1919    Order Status: Completed Specimen: Respiratory from Sputum Updated: 01/28/25 0629     Gram Stain (Respiratory) <10 epithelial cells per low power field.     Gram Stain (Respiratory) Rare WBC's     Gram Stain (Respiratory) Rare yeast    Respiratory Infection Panel (PCR), Nasopharyngeal [[id number]]  (Abnormal) Collected: 01/25/25 1406    Order Status: Completed Specimen: Nasopharyngeal Swab Updated: 01/27/25 1211     Respiratory Infection Panel Source NP Swab     Adenovirus Not Detected     Coronavirus 229E, Common Cold Virus Not Detected     Coronavirus HKU1, Common Cold Virus Not Detected     Coronavirus NL63, Common Cold Virus Not Detected     Coronavirus OC43, Common Cold Virus Not Detected     Comment: The Coronavirus strains detected in this test cause the common cold.  These strains are not the COVID-19 (novel Coronavirus)strain   associated with the respiratory disease outbreak.          SARS-CoV2 (COVID-19) Qualitative PCR Not Detected     Human Metapneumovirus Not Detected     Human Rhinovirus/Enterovirus Detected     Influenza A (subtypes H1, H1-2009,H3) Not Detected     Influenza B Not Detected     Parainfluenza Virus 1 Not Detected     Parainfluenza Virus 2 Not Detected     Parainfluenza Virus 3 Not Detected     Parainfluenza Virus 4 Not Detected     Respiratory Syncytial Virus Not Detected     Bordetella Parapertussis (RI9483) Not Detected     Bordetella pertussis (ptxP) Not Detected     Chlamydia pneumoniae Not Detected     Mycoplasma pneumoniae Not Detected     Comment: Respiratory Infection Panel testing performed by Multiplex PCR.       Narrative:      Assay not valid for lower respiratory specimens, alternate  testing required.            - 1/28: patient with multifocal pneumonia.  Sputum culture is  pending.  She is currently on antibiotics.  Not sure this is all related to rhinovirus.  She was found to have a positive SASKIA as well.  She denies any exposures.  Patient has a history of tongue cancer in 2007 and did get radiation.  States some trouble with swallowing and choking.  She was placed on Vapotherm this morning.  Upon arrival this was off her face so replaced it and oxygenation improved.  She is currently on FiO2 of 80 and a flow of 25.  She was started on steroids.  Did discuss with medicine and consider rheumatology consult as well.  Have ordered speech consult.  Continue to follow up sputum cultures.  Can consider dose of Lasix if needed.f/u pending studies   - 1/29: Rheumatology eval yesterday, follow up SASKIA. Continue atypical coverage. Wean O2, now on vapotherm 25/60. Continue to follow cultures. Acapella/IS. PT/OT.     Acute hypoxemic respiratory failure  - Patient with Hypoxic Respiratory failure which is Acute. She is not on home oxygen. Supplemental oxygen was provided and noted- Oxygen Concentration (%):  [60-80] 60  - Signs/symptoms of respiratory failure include- increased work of breathing and respiratory distress. Contributing diagnoses includes - Pneumonia Labs and images were reviewed. Patient Has not had a recent ABG. Will treat underlying causes and adjust management of respiratory failure as follows-   - Currently on 4 liters/minute oxygen, continue to wean as able  - Continue scheduled nebs  - Empiric CAP coverage, sputum cx if able  - Monitor for worsening fevers, WBC; CXR if worsening respiratory status  - + SASKIA screen, elevated CRP/ESR; complements pending  - 1/28: see pneumonia     ID  Rhinovirus infection  - Continue supportive care  - See plan respiratory failure    José Luis Lee PA-C  Pulmonology  [facility] - Telemetry ([facility])

## 2025-01-29 NOTE — ASSESSMENT & PLAN NOTE
"- Patient has a diagnosis of pneumonia. The cause of the pneumonia is suspected to be bacterial in etiology but organism is not known. The pneumonia is stable. The patient has the following signs/symptoms of pneumonia: persistent hypoxia  and cough. The patient does have a current oxygen requirement and the patient does have a home oxygen requirement. I have reviewed the pertinent imaging. The following cultures have been collected: Blood cultures and Sputum culture The culture results are listed below.   - Current antimicrobial regimen consists of the antibiotics listed below. Will monitor patient closely and continue current treatment plan unchanged.  - Now with productive cough; "thick, brown" sputum - collect sputum cx  - Continue rocephin + doxy  - Mycoplasm, legionella, fungitell pending    Antibiotics (From admission, onward)      Start     Stop Route Frequency Ordered    01/25/25 2100  doxycycline tablet 100 mg         -- Oral Every 12 hours 01/25/25 1809    01/25/25 1400  cefTRIAXone injection 1 g         -- IV Every 24 hours (non-standard times) 01/24/25 2351            Microbiology Results (last 7 days)       Procedure Component Value Units Date/Time    Blood culture x two cultures. Draw prior to antibiotics. [[id number]] Collected: 01/24/25 1250    Order Status: Completed Specimen: Blood from Peripheral, Forearm, Left Updated: 01/29/25 0612     Blood Culture, Routine No Growth to date      No Growth to date      No Growth to date      No Growth to date      No Growth to date    Narrative:      Aerobic and anaerobic    Blood culture x two cultures. Draw prior to antibiotics. [[id number]] Collected: 01/24/25 1256    Order Status: Completed Specimen: Blood from Peripheral, Antecubital, Right Updated: 01/29/25 0612     Blood Culture, Routine No Growth to date      No Growth to date      No Growth to date      No Growth to date      No Growth to date    Narrative:      Aerobic and anaerobic    Culture, " Respiratory with Gram Stain [[id number]] Collected: 01/27/25 1919    Order Status: Completed Specimen: Respiratory from Sputum Updated: 01/28/25 0629     Gram Stain (Respiratory) <10 epithelial cells per low power field.     Gram Stain (Respiratory) Rare WBC's     Gram Stain (Respiratory) Rare yeast    Respiratory Infection Panel (PCR), Nasopharyngeal [[id number]]  (Abnormal) Collected: 01/25/25 1406    Order Status: Completed Specimen: Nasopharyngeal Swab Updated: 01/27/25 1211     Respiratory Infection Panel Source NP Swab     Adenovirus Not Detected     Coronavirus 229E, Common Cold Virus Not Detected     Coronavirus HKU1, Common Cold Virus Not Detected     Coronavirus NL63, Common Cold Virus Not Detected     Coronavirus OC43, Common Cold Virus Not Detected     Comment: The Coronavirus strains detected in this test cause the common cold.  These strains are not the COVID-19 (novel Coronavirus)strain   associated with the respiratory disease outbreak.          SARS-CoV2 (COVID-19) Qualitative PCR Not Detected     Human Metapneumovirus Not Detected     Human Rhinovirus/Enterovirus Detected     Influenza A (subtypes H1, H1-2009,H3) Not Detected     Influenza B Not Detected     Parainfluenza Virus 1 Not Detected     Parainfluenza Virus 2 Not Detected     Parainfluenza Virus 3 Not Detected     Parainfluenza Virus 4 Not Detected     Respiratory Syncytial Virus Not Detected     Bordetella Parapertussis (RA8979) Not Detected     Bordetella pertussis (ptxP) Not Detected     Chlamydia pneumoniae Not Detected     Mycoplasma pneumoniae Not Detected     Comment: Respiratory Infection Panel testing performed by Multiplex PCR.       Narrative:      Assay not valid for lower respiratory specimens, alternate  testing required.            - 1/28: patient with multifocal pneumonia.  Sputum culture is pending.  She is currently on antibiotics.  Not sure this is all related to rhinovirus.  She was found to have a positive SASKIA as well.   She denies any exposures.  Patient has a history of tongue cancer in 2007 and did get radiation.  States some trouble with swallowing and choking.  She was placed on Vapotherm this morning.  Upon arrival this was off her face so replaced it and oxygenation improved.  She is currently on FiO2 of 80 and a flow of 25.  She was started on steroids.  Did discuss with medicine and consider rheumatology consult as well.  Have ordered speech consult.  Continue to follow up sputum cultures.  Can consider dose of Lasix if needed.f/u pending studies   - 1/29: Rheumatology eval yesterday, follow up SASKIA. Continue atypical coverage. Wean O2, now on vapotherm 25/60. Continue to follow cultures. Acapella/IS. PT/OT.

## 2025-01-29 NOTE — ASSESSMENT & PLAN NOTE
Patient has a diagnosis of pneumonia. The cause of the pneumonia is viral in etiology due to  rhinovirus . The pneumonia is improving. The patient has the following signs/symptoms of pneumonia: cough, sputum production, and shortness of breath. The patient does have a current oxygen requirement and the patient does not have a home oxygen requirement. I have reviewed the pertinent imaging. The following cultures have been collected: Blood cultures The culture results are listed below.     Current antimicrobial regimen consists of the antibiotics listed below. Will monitor patient closely and continue current treatment plan unchanged.    Antibiotics (From admission, onward)      Start     Stop Route Frequency Ordered    01/25/25 2100  doxycycline tablet 100 mg         -- Oral Every 12 hours 01/25/25 1809    01/25/25 1400  cefTRIAXone injection 1 g         -- IV Every 24 hours (non-standard times) 01/24/25 2351            Microbiology Results (last 7 days)       Procedure Component Value Units Date/Time    Blood culture x two cultures. Draw prior to antibiotics. [[id number]] Collected: 01/24/25 1250    Order Status: Completed Specimen: Blood from Peripheral, Forearm, Left Updated: 01/29/25 0612     Blood Culture, Routine No Growth to date      No Growth to date      No Growth to date      No Growth to date      No Growth to date    Narrative:      Aerobic and anaerobic    Blood culture x two cultures. Draw prior to antibiotics. [[id number]] Collected: 01/24/25 1256    Order Status: Completed Specimen: Blood from Peripheral, Antecubital, Right Updated: 01/29/25 0612     Blood Culture, Routine No Growth to date      No Growth to date      No Growth to date      No Growth to date      No Growth to date    Narrative:      Aerobic and anaerobic    Culture, Respiratory with Gram Stain [[id number]] Collected: 01/27/25 1919    Order Status: Completed Specimen: Respiratory from Sputum Updated: 01/28/25 0629     Gram Stain  (Respiratory) <10 epithelial cells per low power field.     Gram Stain (Respiratory) Rare WBC's     Gram Stain (Respiratory) Rare yeast    Respiratory Infection Panel (PCR), Nasopharyngeal [[id number]]  (Abnormal) Collected: 01/25/25 1406    Order Status: Completed Specimen: Nasopharyngeal Swab Updated: 01/27/25 1211     Respiratory Infection Panel Source NP Swab     Adenovirus Not Detected     Coronavirus 229E, Common Cold Virus Not Detected     Coronavirus HKU1, Common Cold Virus Not Detected     Coronavirus NL63, Common Cold Virus Not Detected     Coronavirus OC43, Common Cold Virus Not Detected     Comment: The Coronavirus strains detected in this test cause the common cold.  These strains are not the COVID-19 (novel Coronavirus)strain   associated with the respiratory disease outbreak.          SARS-CoV2 (COVID-19) Qualitative PCR Not Detected     Human Metapneumovirus Not Detected     Human Rhinovirus/Enterovirus Detected     Influenza A (subtypes H1, H1-2009,H3) Not Detected     Influenza B Not Detected     Parainfluenza Virus 1 Not Detected     Parainfluenza Virus 2 Not Detected     Parainfluenza Virus 3 Not Detected     Parainfluenza Virus 4 Not Detected     Respiratory Syncytial Virus Not Detected     Bordetella Parapertussis (RC6359) Not Detected     Bordetella pertussis (ptxP) Not Detected     Chlamydia pneumoniae Not Detected     Mycoplasma pneumoniae Not Detected     Comment: Respiratory Infection Panel testing performed by Multiplex PCR.       Narrative:      Assay not valid for lower respiratory specimens, alternate  testing required.        Worsening   Abg reviewed   Blood culture negative growth to date x 4 days  On vapotherm   Add pulmicort and systemic steroids continued   Has incentive spirometer and aerobika   Physical/occupational therapy recommending homehealth physical/occupational therapy     1/29/25: On 1/28/25, respiratory status worsened requiring 80% Vapotherm. IV Lasix added. Blood  cultrues show NGTD. Mycoplasm, legionella, fungitell ordered. Sputum cultures pending. +Mycoplasm IgG, IgM negative.   +SASKIA, elevated CRP and ESR. Complements normal. dsDNA ab normal. Pulmonology recommended Rheumatology review case. Per Dr. Pfeiffer-low suspicion for connective tissue disease based on these labs and reported symptoms. No additional medication recommendations at this point. He recommended to follow up on the SASKIA profile and reach back out him if anything in the profile is positive and at that point I can set her up for outpatient follow up (or external referral for faster Rheumatology access).   On 1/29/25, Speech recommends MBSS in am

## 2025-01-30 PROBLEM — R13.10 DYSPHAGIA: Status: ACTIVE | Noted: 2025-01-30

## 2025-01-30 LAB
ALBUMIN SERPL BCP-MCNC: 2.2 G/DL (ref 3.5–5.2)
ALP SERPL-CCNC: 103 U/L (ref 40–150)
ALT SERPL W/O P-5'-P-CCNC: 142 U/L (ref 10–44)
ANION GAP SERPL CALC-SCNC: 10 MMOL/L (ref 8–16)
AST SERPL-CCNC: 142 U/L (ref 10–40)
BACTERIA BLD CULT: NORMAL
BACTERIA BLD CULT: NORMAL
BACTERIA SPEC AEROBE CULT: ABNORMAL
BACTERIA SPEC AEROBE CULT: ABNORMAL
BASOPHILS # BLD AUTO: 0.03 K/UL (ref 0–0.2)
BASOPHILS NFR BLD: 0.3 % (ref 0–1.9)
BILIRUB SERPL-MCNC: 0.4 MG/DL (ref 0.1–1)
BUN SERPL-MCNC: 23 MG/DL (ref 8–23)
CALCIUM SERPL-MCNC: 9.2 MG/DL (ref 8.7–10.5)
CHLORIDE SERPL-SCNC: 105 MMOL/L (ref 95–110)
CO2 SERPL-SCNC: 23 MMOL/L (ref 23–29)
CREAT SERPL-MCNC: 0.6 MG/DL (ref 0.5–1.4)
DIFFERENTIAL METHOD BLD: ABNORMAL
EOSINOPHIL # BLD AUTO: 0 K/UL (ref 0–0.5)
EOSINOPHIL NFR BLD: 0 % (ref 0–8)
ERYTHROCYTE [DISTWIDTH] IN BLOOD BY AUTOMATED COUNT: 13.2 % (ref 11.5–14.5)
EST. GFR  (NO RACE VARIABLE): >60 ML/MIN/1.73 M^2
GLUCOSE SERPL-MCNC: 140 MG/DL (ref 70–110)
GRAM STN SPEC: ABNORMAL
HCT VFR BLD AUTO: 37.1 % (ref 37–48.5)
HGB BLD-MCNC: 12.1 G/DL (ref 12–16)
IMM GRANULOCYTES # BLD AUTO: 0.35 K/UL (ref 0–0.04)
IMM GRANULOCYTES NFR BLD AUTO: 3.1 % (ref 0–0.5)
LYMPHOCYTES # BLD AUTO: 0.6 K/UL (ref 1–4.8)
LYMPHOCYTES NFR BLD: 4.9 % (ref 18–48)
MAGNESIUM SERPL-MCNC: 2.1 MG/DL (ref 1.6–2.6)
MCH RBC QN AUTO: 28.7 PG (ref 27–31)
MCHC RBC AUTO-ENTMCNC: 32.6 G/DL (ref 32–36)
MCV RBC AUTO: 88 FL (ref 82–98)
MONOCYTES # BLD AUTO: 0.5 K/UL (ref 0.3–1)
MONOCYTES NFR BLD: 4.2 % (ref 4–15)
NEUTROPHILS # BLD AUTO: 9.9 K/UL (ref 1.8–7.7)
NEUTROPHILS NFR BLD: 87.5 % (ref 38–73)
NRBC BLD-RTO: 0 /100 WBC
PHOSPHATE SERPL-MCNC: 3.6 MG/DL (ref 2.7–4.5)
PLATELET # BLD AUTO: 200 K/UL (ref 150–450)
PMV BLD AUTO: 10.4 FL (ref 9.2–12.9)
POTASSIUM SERPL-SCNC: 4.7 MMOL/L (ref 3.5–5.1)
PROT SERPL-MCNC: 5.2 G/DL (ref 6–8.4)
RBC # BLD AUTO: 4.21 M/UL (ref 4–5.4)
SODIUM SERPL-SCNC: 138 MMOL/L (ref 136–145)
WBC # BLD AUTO: 11.34 K/UL (ref 3.9–12.7)

## 2025-01-30 PROCEDURE — 25000242 PHARM REV CODE 250 ALT 637 W/ HCPCS: Performed by: FAMILY MEDICINE

## 2025-01-30 PROCEDURE — 63600175 PHARM REV CODE 636 W HCPCS: Mod: JZ,TB | Performed by: FAMILY MEDICINE

## 2025-01-30 PROCEDURE — 63600175 PHARM REV CODE 636 W HCPCS: Performed by: NURSE PRACTITIONER

## 2025-01-30 PROCEDURE — 97530 THERAPEUTIC ACTIVITIES: CPT

## 2025-01-30 PROCEDURE — 97116 GAIT TRAINING THERAPY: CPT

## 2025-01-30 PROCEDURE — 25500020 PHARM REV CODE 255: Performed by: SPECIALIST

## 2025-01-30 PROCEDURE — 94761 N-INVAS EAR/PLS OXIMETRY MLT: CPT

## 2025-01-30 PROCEDURE — A9698 NON-RAD CONTRAST MATERIALNOC: HCPCS | Performed by: SPECIALIST

## 2025-01-30 PROCEDURE — 84100 ASSAY OF PHOSPHORUS: CPT | Performed by: NURSE PRACTITIONER

## 2025-01-30 PROCEDURE — 80053 COMPREHEN METABOLIC PANEL: CPT | Performed by: NURSE PRACTITIONER

## 2025-01-30 PROCEDURE — 94799 UNLISTED PULMONARY SVC/PX: CPT | Mod: XB

## 2025-01-30 PROCEDURE — 94640 AIRWAY INHALATION TREATMENT: CPT

## 2025-01-30 PROCEDURE — 97535 SELF CARE MNGMENT TRAINING: CPT

## 2025-01-30 PROCEDURE — 36415 COLL VENOUS BLD VENIPUNCTURE: CPT | Performed by: NURSE PRACTITIONER

## 2025-01-30 PROCEDURE — 83735 ASSAY OF MAGNESIUM: CPT | Performed by: NURSE PRACTITIONER

## 2025-01-30 PROCEDURE — 27100171 HC OXYGEN HIGH FLOW UP TO 24 HOURS

## 2025-01-30 PROCEDURE — 85025 COMPLETE CBC W/AUTO DIFF WBC: CPT | Performed by: NURSE PRACTITIONER

## 2025-01-30 PROCEDURE — 94664 DEMO&/EVAL PT USE INHALER: CPT

## 2025-01-30 PROCEDURE — 99900035 HC TECH TIME PER 15 MIN (STAT)

## 2025-01-30 PROCEDURE — 94799 UNLISTED PULMONARY SVC/PX: CPT

## 2025-01-30 PROCEDURE — 97551 CAREGIVER TRAING EA ADDL 15: CPT

## 2025-01-30 PROCEDURE — 25000003 PHARM REV CODE 250: Performed by: NURSE PRACTITIONER

## 2025-01-30 PROCEDURE — 92526 ORAL FUNCTION THERAPY: CPT

## 2025-01-30 PROCEDURE — 27000646 HC AEROBIKA DEVICE

## 2025-01-30 PROCEDURE — 21400001 HC TELEMETRY ROOM

## 2025-01-30 PROCEDURE — 25000003 PHARM REV CODE 250: Performed by: FAMILY MEDICINE

## 2025-01-30 PROCEDURE — 92611 MOTION FLUOROSCOPY/SWALLOW: CPT

## 2025-01-30 PROCEDURE — 27000207 HC ISOLATION

## 2025-01-30 RX ADMIN — ALBUTEROL SULFATE 2.5 MG: 0.83 SOLUTION RESPIRATORY (INHALATION) at 02:01

## 2025-01-30 RX ADMIN — DOXYCYCLINE HYCLATE 100 MG: 100 TABLET, COATED ORAL at 09:01

## 2025-01-30 RX ADMIN — SERTRALINE HYDROCHLORIDE 100 MG: 50 TABLET ORAL at 09:01

## 2025-01-30 RX ADMIN — POTASSIUM CHLORIDE 40 MEQ: 1500 TABLET, EXTENDED RELEASE ORAL at 09:01

## 2025-01-30 RX ADMIN — FUROSEMIDE 20 MG: 10 INJECTION, SOLUTION INTRAMUSCULAR; INTRAVENOUS at 09:01

## 2025-01-30 RX ADMIN — LEVOTHYROXINE SODIUM 50 MCG: 0.05 TABLET ORAL at 06:01

## 2025-01-30 RX ADMIN — ALBUTEROL SULFATE 2.5 MG: 0.83 SOLUTION RESPIRATORY (INHALATION) at 09:01

## 2025-01-30 RX ADMIN — Medication 400 ML: at 03:01

## 2025-01-30 RX ADMIN — CLOPIDOGREL BISULFATE 75 MG: 75 TABLET ORAL at 06:01

## 2025-01-30 RX ADMIN — ALBUTEROL SULFATE 2.5 MG: 0.83 SOLUTION RESPIRATORY (INHALATION) at 08:01

## 2025-01-30 RX ADMIN — BUDESONIDE INHALATION 0.5 MG: 0.5 SUSPENSION RESPIRATORY (INHALATION) at 09:01

## 2025-01-30 RX ADMIN — METHYLPREDNISOLONE SODIUM SUCCINATE 40 MG: 40 INJECTION, POWDER, FOR SOLUTION INTRAMUSCULAR; INTRAVENOUS at 09:01

## 2025-01-30 RX ADMIN — Medication 400 ML: at 09:01

## 2025-01-30 RX ADMIN — GUAIFENESIN 600 MG: 600 TABLET, EXTENDED RELEASE ORAL at 09:01

## 2025-01-30 RX ADMIN — Medication 400 ML: at 10:01

## 2025-01-30 RX ADMIN — Medication 400 ML: at 06:01

## 2025-01-30 RX ADMIN — BUDESONIDE INHALATION 0.5 MG: 0.5 SUSPENSION RESPIRATORY (INHALATION) at 08:01

## 2025-01-30 RX ADMIN — BARIUM SULFATE 20 ML: 0.81 POWDER, FOR SUSPENSION ORAL at 12:01

## 2025-01-30 RX ADMIN — ATORVASTATIN CALCIUM 10 MG: 10 TABLET, FILM COATED ORAL at 09:01

## 2025-01-30 RX ADMIN — CEFTRIAXONE 1 G: 1 INJECTION, POWDER, FOR SOLUTION INTRAMUSCULAR; INTRAVENOUS at 01:01

## 2025-01-30 RX ADMIN — PANTOPRAZOLE SODIUM 40 MG: 40 TABLET, DELAYED RELEASE ORAL at 09:01

## 2025-01-30 RX ADMIN — ENOXAPARIN SODIUM 40 MG: 40 INJECTION SUBCUTANEOUS at 05:01

## 2025-01-30 RX ADMIN — ASPIRIN 81 MG: 81 TABLET, COATED ORAL at 09:01

## 2025-01-30 RX ADMIN — Medication 400 ML: at 05:01

## 2025-01-30 RX ADMIN — METOPROLOL SUCCINATE 50 MG: 50 TABLET, EXTENDED RELEASE ORAL at 09:01

## 2025-01-30 NOTE — PLAN OF CARE
Bed mobility SBA  Seated scoot SBA  Sit to stand min A  Stand pivot transfer to bedside chair with no AD min A  Sit to stand with RW from bedside chair CGA  Ambulated x4 feet CGA with RW    Recommend low intensity therapy at DC

## 2025-01-30 NOTE — ASSESSMENT & PLAN NOTE
Speech recommended MBSS which showed moderate oropharyngeal dysphagia, likely acute on chronic related to hx of tongue cancer (2007) s/p radiation, CVA, ACDF of C4-7, and generalized weakness. Speech recommended Nectar/Mildly thick liquids (IDDSI 2) and Minced and moist consistencies (IDDSI 5), Medications should be taken crushed (when possible) in puree.

## 2025-01-30 NOTE — PROGRESS NOTES
[facility] ([facility])  [facility] Medicine  Progress Note    Patient Name: Linda Segura  MRN: [id number]  Patient Class: IP- Inpatient   Admission Date: 1/24/2025  Length of Stay: 6 days  Attending Physician: Hayde Medina MD  Primary Care Provider: Rolly Hammond MD        Subjective     Principal Problem:Multifocal pneumonia        HPI:  Linda Segura is a 74 y.o. female with a PMH  has a past medical history of Abnormal Pap smear of vagina, Anxiety, Anxiety and depression, Cancer, CVA (cerebral vascular accident), Essential hypertension (6/12/2018), Mental disorder, Other specified hypothyroidism (2/13/2021), and Scoliosis.  Presented to outside facility for evaluation of productive cough and generalized weakness with fever of 101 with associated chills started 2 days ago.  Denies any recent illnesses or sick contacts.  Reports minimal relief with over-the-counter medications.  Denies any chest pain, palpitations, shortness for breath, dyspnea exertion, or any other symptoms.    ER workup revealed CBC to be unremarkable.  CMP revealed potassium 2.8 with CBG of 240 mg/dL.  .  Troponin 0.029.  Initial lactic acid 3.7 with repeat lactic acid of 1.1.  Procalcitonin level 0.71.  Flu/COVID negative.  UA unremarkable.  X-ray revealed development of multilobar pneumonia.  EKG revealed normal sinus rhythm with prolonged QT with a ventricular rate of 82 beats per minute and a QT/QTC of 428/500.  Initial O2 saturation of 88% on room air with currently 96% on 4 liters/minute via nasal cannula.  Patient received 500 mg azithromycin 1 g Rocephin IV at outside facility.  Patient also received 10 mEq potassium IV and 1, 848 cc normal saline bolus.  Hospital Medicine consulted to admit patient for multifocal pneumonia.  Patient in agreement with treatment plan.  Patient admitted under inpatient status.    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   Initial lactic acid 3.7 with repeat lactic acid of 1.1. Procalcitonin level 0.71 . Echo showed normal LVEF. Respiratory infection panel positive for Rhinovirus. Blood cultures show NGTD.   Pulmonology consulted and agreed on POC. Speech consulted and noted no aspiration, although, pt did report intermittent globus sensation. Speech recommended OP GI eval.      On 1/28/25, respiratory status worsened requiring 80% Vapotherm. IV Lasix added. +Mycoplasm IgG, IgM negative. Legionella negative, fungitell negative. Sputum cultures grew moderate caitlyn albicans.      +SASKIA, elevated CRP and ESR. Complements normal. dsDNA ab normal. Pulmonology recommended Rheumatology review case. Per Dr. Pfeiffer-low suspicion for connective tissue disease based on these labs and reported symptoms. No additional medication recommendations at this point. He recommended to follow up on the SASKIA profile and reach back out him if anything in the profile is positive and at that point I can set her up for outpatient follow up (or external referral for faster Rheumatology access).     On 1/30/25, Speech recommended MBSS which showed moderate oropharyngeal dysphagia, likely acute on chronic related to hx of tongue cancer (2007) s/p radiation, CVA, ACDF of C4-7, and generalized weakness. Speech recommended Nectar/Mildly thick liquids (IDDSI 2) and Minced and moist consistencies (IDDSI 5), Medications should be taken crushed (when possible) in puree.   Vapotherm weaned to 60%    Physical/occupational therapy recommending home health     Interval History:  Afebrile, Feeling mildly improved. Vapotherm weaned to 60%. Speech recommended MBSS which  showed moderate oropharyngeal dysphagia,Speech recommended Nectar/Mildly thick liquids (IDDSI 2) and Minced and moist consistencies (IDDSI 5). Recommended continued OOB TID    Review of Systems   Constitutional:  Positive for activity change, appetite change and fatigue.   HENT:  Positive for trouble swallowing.    Respiratory:  Positive for cough and shortness of breath.    Cardiovascular:  Negative for palpitations.   Gastrointestinal:  Negative for abdominal pain, constipation, nausea and vomiting.   Neurological:  Positive for weakness.   Psychiatric/Behavioral:  Positive for decreased concentration and dysphoric mood. Negative for agitation, behavioral problems and confusion. The patient is not nervous/anxious.      Objective:     Vital Signs (Most Recent):  Temp: 98 °F (36.7 °C) (01/30/25 1150)  Pulse: 81 (01/30/25 1150)  Resp: 18 (01/30/25 1150)  BP: 136/61 (01/30/25 1150)  SpO2: (!) 94 % (01/30/25 1150) Vital Signs (24h Range):  Temp:  [97.1 °F (36.2 °C)-98 °F (36.7 °C)] 98 °F (36.7 °C)  Pulse:  [64-86] 81  Resp:  [17-18] 18  SpO2:  [90 %-97 %] 94 %  BP: (116-136)/(51-74) 136/61     Weight: 74.4 kg (164 lb 0.4 oz)  Body mass index is 26.47 kg/m².    Intake/Output Summary (Last 24 hours) at 1/30/2025 1159  Last data filed at 1/30/2025 0900  Gross per 24 hour   Intake 1437 ml   Output --   Net 1437 ml         Physical Exam  Vitals and nursing note reviewed.   Constitutional:       General: She is not in acute distress.     Appearance: She is ill-appearing. She is not toxic-appearing.   HENT:      Head: Normocephalic and atraumatic.      Comments: Edentulous   Cardiovascular:      Rate and Rhythm: Normal rate and regular rhythm.   Pulmonary:      Effort: Tachypnea and accessory muscle usage present. No respiratory distress.      Breath sounds: Decreased air movement present. Rhonchi and rales present.      Comments: +conversational dyspnea   Vapotherm in place   Abdominal:      Palpations: Abdomen is soft.       "Tenderness: There is no abdominal tenderness.   Skin:     General: Skin is warm.      Coloration: Skin is pale.   Neurological:      Mental Status: She is alert and oriented to person, place, and time.      Motor: Weakness present.   Psychiatric:         Mood and Affect: Mood normal.         Speech: Speech normal.         Behavior: Behavior is cooperative.             Significant Labs: All pertinent labs within the past 24 hours have been reviewed.  ABGs:   No results for input(s): "PH", "PCO2", "HCO3", "POCSATURATED", "BE", "TOTALHB", "COHB", "METHB", "O2HB", "POCFIO2", "PO2" in the last 48 hours.    CBC:   Recent Labs   Lab 01/29/25  0821 01/30/25  0504   WBC 9.27 11.34   HGB 12.2 12.1   HCT 36.6* 37.1    200     CMP:   Recent Labs   Lab 01/29/25  0821 01/30/25  0504    138   K 4.1 4.7    105   CO2 28 23   * 140*   BUN 22 23   CREATININE 0.6 0.6   CALCIUM 9.5 9.2   PROT 5.4* 5.2*   ALBUMIN 2.2* 2.2*   BILITOT 0.5 0.4   ALKPHOS 99 103   AST 36 142*   ALT 49* 142*   ANIONGAP 8 10     Respiratory culture growing rare yeast, rare wbcs  Ginny positive   Complement within normal limits     Significant Imaging: I have reviewed all pertinent imaging results/findings within the past 24 hours.  CXR: I have reviewed all pertinent results/findings within the past 24 hours and my personal findings are:  persistent multifocal pneumonia     Assessment and Plan     * Multifocal pneumonia  Patient has a diagnosis of pneumonia. The cause of the pneumonia is viral in etiology due to  rhinovirus . The pneumonia is improving. The patient has the following signs/symptoms of pneumonia: cough, sputum production, and shortness of breath. The patient does have a current oxygen requirement and the patient does not have a home oxygen requirement. I have reviewed the pertinent imaging. The following cultures have been collected: Blood cultures The culture results are listed below.     Current antimicrobial regimen " consists of the antibiotics listed below. Will monitor patient closely and continue current treatment plan unchanged.    Antibiotics (From admission, onward)      Start     Stop Route Frequency Ordered    01/25/25 2100  doxycycline tablet 100 mg         -- Oral Every 12 hours 01/25/25 1809    01/25/25 1400  cefTRIAXone injection 1 g         -- IV Every 24 hours (non-standard times) 01/24/25 2351            Microbiology Results (last 7 days)       Procedure Component Value Units Date/Time    Culture, Respiratory with Gram Stain [[id number]]  (Abnormal) Collected: 01/27/25 1919    Order Status: Completed Specimen: Respiratory from Sputum Updated: 01/30/25 1100     Respiratory Culture No S aureus or Pseudomonas isolated.      CANDIDA ALBICANS  Moderate       Gram Stain (Respiratory) <10 epithelial cells per low power field.     Gram Stain (Respiratory) Rare WBC's     Gram Stain (Respiratory) Rare yeast    Blood culture x two cultures. Draw prior to antibiotics. [[id number]] Collected: 01/24/25 1250    Order Status: Completed Specimen: Blood from Peripheral, Forearm, Left Updated: 01/30/25 0612     Blood Culture, Routine No growth after 5 days.    Narrative:      Aerobic and anaerobic    Blood culture x two cultures. Draw prior to antibiotics. [[id number]] Collected: 01/24/25 1256    Order Status: Completed Specimen: Blood from Peripheral, Antecubital, Right Updated: 01/30/25 0612     Blood Culture, Routine No growth after 5 days.    Narrative:      Aerobic and anaerobic    Respiratory Infection Panel (PCR), Nasopharyngeal [[id number]]  (Abnormal) Collected: 01/25/25 1406    Order Status: Completed Specimen: Nasopharyngeal Swab Updated: 01/27/25 1211     Respiratory Infection Panel Source NP Swab     Adenovirus Not Detected     Coronavirus 229E, Common Cold Virus Not Detected     Coronavirus HKU1, Common Cold Virus Not Detected     Coronavirus NL63, Common Cold Virus Not Detected     Coronavirus OC43, Common Cold Virus  Not Detected     Comment: The Coronavirus strains detected in this test cause the common cold.  These strains are not the COVID-19 (novel Coronavirus)strain   associated with the respiratory disease outbreak.          SARS-CoV2 (COVID-19) Qualitative PCR Not Detected     Human Metapneumovirus Not Detected     Human Rhinovirus/Enterovirus Detected     Influenza A (subtypes H1, H1-2009,H3) Not Detected     Influenza B Not Detected     Parainfluenza Virus 1 Not Detected     Parainfluenza Virus 2 Not Detected     Parainfluenza Virus 3 Not Detected     Parainfluenza Virus 4 Not Detected     Respiratory Syncytial Virus Not Detected     Bordetella Parapertussis (XW4737) Not Detected     Bordetella pertussis (ptxP) Not Detected     Chlamydia pneumoniae Not Detected     Mycoplasma pneumoniae Not Detected     Comment: Respiratory Infection Panel testing performed by Multiplex PCR.       Narrative:      Assay not valid for lower respiratory specimens, alternate  testing required.        Worsening   Abg reviewed   Blood culture negative growth to date x 4 days  On vapotherm   Add pulmicort and systemic steroids continued   Has incentive spirometer and aerobika   Physical/occupational therapy recommending homehealth physical/occupational therapy     1/29/25: On 1/28/25, respiratory status worsened requiring 80% Vapotherm. IV Lasix added. Blood cultrues show NGTD. Mycoplasm, legionella, fungitell ordered. Sputum cultures pending. +Mycoplasm IgG, IgM negative.   +SASKIA, elevated CRP and ESR. Complements normal. dsDNA ab normal. Pulmonology recommended Rheumatology review case. Per Dr. Pfeiffer-low suspicion for connective tissue disease based on these labs and reported symptoms. No additional medication recommendations at this point. He recommended to follow up on the SASKIA profile and reach back out him if anything in the profile is positive and at that point I can set her up for outpatient follow up (or external referral for faster  "Rheumatology access).     1/30/25: Vapotherm weaned to 60%. Legionella and fungitell negative. Sputum culture growing Candida Albicans     Acute hypoxemic respiratory failure  Patient with Hypoxic Respiratory failure which is Acute.  she is not on home oxygen. Supplemental oxygen was provided and noted- Oxygen Concentration (%):  [70-85] 70  Signs/symptoms of respiratory failure include- tachypnea, increased work of breathing, and respiratory distress. Contributing diagnoses includes - Pneumonia Labs and images were reviewed. Patient Has not had a recent ABG. Will treat underlying causes and adjust management of respiratory failure as follows- continue current regimen      On vapotherm 2/2 persistent pneumonia   Treatment adjusted to include systemic steroids, IV Abx, IV lasix, and neb txs   Wean as able  Pulmonology following    Dysphagia  Speech recommended MBSS which showed moderate oropharyngeal dysphagia, likely acute on chronic related to hx of tongue cancer (2007) s/p radiation, CVA, ACDF of C4-7, and generalized weakness. Speech recommended Nectar/Mildly thick liquids (IDDSI 2) and Minced and moist consistencies (IDDSI 5), Medications should be taken crushed (when possible) in puree.         Rhinovirus infection  IV steroids and neb txs    Prolonged Q-T interval on ECG  Discontinue azithromycin  Add doxycycline     History of CVA (cerebrovascular accident)  -continue ASA, plavix, and lipitor  Thrombocytopenia noted   Continue plavix  Discontinue lovenox    1/29/25: thrombocytopenia resolved- will add Lovenox ppx    Hyperglycemia  Patient's FSGs are controlled on current medication regimen.  Last A1c reviewed-   Lab Results   Component Value Date    HGBA1C 5.0 01/25/2025     Most recent fingerstick glucose reviewed- No results for input(s): "POCTGLUCOSE" in the last 24 hours.    Current correctional scale  Low  Maintain anti-hyperglycemic dose as follows-   Antihyperglycemics (From admission, onward)      " None        No History of diabetes.  Plan:  -SSI  -A1c  -Accu-checks  -Hypoglycemic protocol      On systemic steroids with poor po intake  NISS  Continue monitoring        Other specified hypothyroidism  Patient has chronic hypothyroidism. TFTs reviewed-   Lab Results   Component Value Date    TSH 6.52 (H) 03/20/2024   Continue home medication(s)   Follow up outpatient       Essential hypertension  Chronic, controlled.  Latest blood pressure and vitals reviewed-   Temp:  [97.6 °F (36.4 °C)-98.9 °F (37.2 °C)]   Pulse:  []   Resp:  [16-22]   BP: (112-191)/(53-83)   SpO2:  [77 %-100 %] .   Home meds for hypertension were reviewed and noted below.   Hypertension Medications               metoprolol succinate (TOPROL-XL) 50 MG 24 hr tablet Take 1 tablet by mouth once daily.            While in the hospital, will manage blood pressure as follows; Continue home antihypertensive regimen    Will utilize p.r.n. blood pressure medication only if patient's blood pressure greater than  180/110 and she develops symptoms such as worsening chest pain or shortness of breath.      GERD (gastroesophageal reflux disease)  Chronic. Stable. Currently asymptomatic. Home medications include PPI/Antacids as needed.  Plan:  -Continue PPI/Antacids as needed         Anxiety and depression  Chronic. Stable. Not in acute exacerbation and currently denies endorsing any suicidal/homicidal ideations.   Plan:  Received xanax last night  -Continue home medications (xanax and zoloft)        VTE Risk Mitigation (From admission, onward)           Ordered     enoxaparin injection 40 mg  Every 24 hours         01/29/25 1204     IP VTE HIGH RISK PATIENT  Once         01/24/25 2354     Place sequential compression device  Until discontinued         01/24/25 2354                    Discharge Planning   HERNANDO:      Code Status: Full Code   Medical Readiness for Discharge Date:   Discharge Plan A: Home, Home with family                Please place  Justification for DME        Esther Richardson NP  Department of Hospital Medicine   [facility] Telemetry ([facility])

## 2025-01-30 NOTE — SUBJECTIVE & OBJECTIVE
Interval History:  Afebrile, Feeling mildly improved. Vapotherm weaned to 60%. Speech recommended MBSS which showed moderate oropharyngeal dysphagia,Speech recommended Nectar/Mildly thick liquids (IDDSI 2) and Minced and moist consistencies (IDDSI 5). Recommended continued OOB TID    Review of Systems   Constitutional:  Positive for activity change, appetite change and fatigue.   HENT:  Positive for trouble swallowing.    Respiratory:  Positive for cough and shortness of breath.    Cardiovascular:  Negative for palpitations.   Gastrointestinal:  Negative for abdominal pain, constipation, nausea and vomiting.   Neurological:  Positive for weakness.   Psychiatric/Behavioral:  Positive for decreased concentration and dysphoric mood. Negative for agitation, behavioral problems and confusion. The patient is not nervous/anxious.      Objective:     Vital Signs (Most Recent):  Temp: 98 °F (36.7 °C) (01/30/25 1150)  Pulse: 81 (01/30/25 1150)  Resp: 18 (01/30/25 1150)  BP: 136/61 (01/30/25 1150)  SpO2: (!) 94 % (01/30/25 1150) Vital Signs (24h Range):  Temp:  [97.1 °F (36.2 °C)-98 °F (36.7 °C)] 98 °F (36.7 °C)  Pulse:  [64-86] 81  Resp:  [17-18] 18  SpO2:  [90 %-97 %] 94 %  BP: (116-136)/(51-74) 136/61     Weight: 74.4 kg (164 lb 0.4 oz)  Body mass index is 26.47 kg/m².    Intake/Output Summary (Last 24 hours) at 1/30/2025 1159  Last data filed at 1/30/2025 0900  Gross per 24 hour   Intake 1437 ml   Output --   Net 1437 ml         Physical Exam  Vitals and nursing note reviewed.   Constitutional:       General: She is not in acute distress.     Appearance: She is ill-appearing. She is not toxic-appearing.   HENT:      Head: Normocephalic and atraumatic.      Comments: Edentulous   Cardiovascular:      Rate and Rhythm: Normal rate and regular rhythm.   Pulmonary:      Effort: Tachypnea and accessory muscle usage present. No respiratory distress.      Breath sounds: Decreased air movement present. Rhonchi and rales present.     "  Comments: +conversational dyspnea   Vapotherm in place   Abdominal:      Palpations: Abdomen is soft.      Tenderness: There is no abdominal tenderness.   Skin:     General: Skin is warm.      Coloration: Skin is pale.   Neurological:      Mental Status: She is alert and oriented to person, place, and time.      Motor: Weakness present.   Psychiatric:         Mood and Affect: Mood normal.         Speech: Speech normal.         Behavior: Behavior is cooperative.             Significant Labs: All pertinent labs within the past 24 hours have been reviewed.  ABGs:   No results for input(s): "PH", "PCO2", "HCO3", "POCSATURATED", "BE", "TOTALHB", "COHB", "METHB", "O2HB", "POCFIO2", "PO2" in the last 48 hours.    CBC:   Recent Labs   Lab 01/29/25  0821 01/30/25  0504   WBC 9.27 11.34   HGB 12.2 12.1   HCT 36.6* 37.1    200     CMP:   Recent Labs   Lab 01/29/25  0821 01/30/25  0504    138   K 4.1 4.7    105   CO2 28 23   * 140*   BUN 22 23   CREATININE 0.6 0.6   CALCIUM 9.5 9.2   PROT 5.4* 5.2*   ALBUMIN 2.2* 2.2*   BILITOT 0.5 0.4   ALKPHOS 99 103   AST 36 142*   ALT 49* 142*   ANIONGAP 8 10     Respiratory culture growing rare yeast, rare wbcs  Ginny positive   Complement within normal limits     Significant Imaging: I have reviewed all pertinent imaging results/findings within the past 24 hours.  CXR: I have reviewed all pertinent results/findings within the past 24 hours and my personal findings are:  persistent multifocal pneumonia   "

## 2025-01-30 NOTE — PT/OT/SLP PROGRESS
"Physical Therapy  Treatment    Linda Segura   MRN: [id number]   Admitting Diagnosis: Multifocal pneumonia    PT Received On: 01/30/25  PT Start Time: 0900     PT Stop Time: 0923    PT Total Time (min): 23 min       Billable Minutes:  Gait Training 13 and Therapeutic Activity 10    Treatment Type: Treatment  PT/PTA: PT     Number of PTA visits since last PT visit: 0       General Precautions: Standard, fall  Orthopedic Precautions: N/A  Braces: N/A  Respiratory Status:  vapotherm         Subjective:  Communicated with nurse AND EPIC CHART REVIEW  prior to session.   PT AGREED TO TX     Pain/Comfort  Pain Rating 1: 0/10  Pain Rating Post-Intervention 1: 0/10    Objective:   Patient found with: peripheral IV, telemetry, oxygen, pulse ox (continuous) (vapotherm)    Functional Mobility:  PT LOG ROLLED TO LEFT AND SEATED EOB WITH SBA AND INC TIME. PT SCOOT WITH SBA. PT DONNED B SOCKS WITH LOB BALANCE POST HOWEVER ABLE TO RETURN SEATED WITH SBA.  GT BELT DONNED. PT STOOD WITH MIN A FOR STAND  PIVOT T/F TO CHAIR WITH NO AD. PT SEATED FOR REST BREAK AND RE-EDUCATED ON PURSED LIP BREATHING. PT STOOD X 2 WITH RW AND CGA FOR GT TRAINING X 4' WITH RW AND CGA. PT T/F TO CHAIR WITH CGA.    Treatment and Education:  PT EDUCATED ON "CALL DON'T FALL", ENCOURAGED TO CALL FOR ASSISTANCE WITH ALL NEEDS FOR OOB MOBILITY.  PT EDUCATED TO SIT OOB FOR ALL MEALS TO INC UPRIGHT TOLERANCE.        AM-PAC 6 CLICK MOBILITY  How much help from another person does this patient currently need?   1 = Unable, Total/Dependent Assistance  2 = A lot, Maximum/Moderate Assistance  3 = A little, Minimum/Contact Guard/Supervision  4 = None, Modified [place]/Independent    Turning over in bed (including adjusting bedclothes, sheets and blankets)?: 4  Sitting down on and standing up from a chair with arms (e.g., wheelchair, bedside commode, etc.): 3  Moving from lying on back to sitting on the side of the bed?: 4  Moving to and from a bed to a chair " (including a wheelchair)?: 3  Need to walk in hospital room?: 3  Climbing 3-5 steps with a railing?: 1  Basic Mobility Total Score: 18    AM-PAC Raw Score CMS G-Code Modifier Level of Impairment Assistance   6 % Total / Unable   7 - 9 CM 80 - 100% Maximal Assist   10 - 14 CL 60 - 80% Moderate Assist   15 - 19 CK 40 - 60% Moderate Assist   20 - 22 CJ 20 - 40% Minimal Assist   23 CI 1-20% SBA / CGA   24 CH 0% Independent/ Mod I     Patient left up in chair with call button in reach.    Assessment:  PT PROGRESSING WITH GT TRAINING THIS SESSION.     Rehab identified problem list/impairments: weakness, impaired endurance, impaired self care skills, impaired cardiopulmonary response to activity, impaired balance, gait instability, impaired functional mobility, decreased lower extremity function    Rehab potential is good.    Activity tolerance: Fair    Discharge recommendations: Low Intensity Therapy  References:   Toby PRADO, Adams M, Erik VK, Dequan SD, Nir FS, Toby AM. AM-PAC 6-Clicks functional assessment scores predict acute care hospital discharge destination, Phys Ther [id number].     Barriers to discharge:      Equipment recommendations: none     GOALS:   Multidisciplinary Problems       Physical Therapy Goals          Problem: Physical Therapy    Goal Priority Disciplines Outcome Interventions   Physical Therapy Goal     PT, PT/OT Progressing    Description: LT/10/25  1. PT WILL COMPLETE BED MOBILITY IND  2. PT WILL STAND T/F TO CHAIR WITH RW MOD I  3. PT WILL GT TRAIN X 150' WITH RW MOD I TO PROGRESS GT.  4. PT WILL INC AMPAC SCORE BY 2 POINTS TO PROGRESS GROSS FUNC MOBILITY.                          DME Justifications:  No DME recommended requiring DME justifications    PLAN:    Patient to be seen 3 x/week to address the above listed problems via gait training, therapeutic activities, therapeutic exercises  Plan of Care expires: 02/10/25  Plan of Care reviewed with: patient,  family         01/30/2025

## 2025-01-30 NOTE — PT/OT/SLP PROGRESS
"[facility]   Inpatient MODIFIED BARIUM SWALLOW STUDY    Date: 1/24/2025     Name: Linda Segura   MRN: [id number]    Therapy Diagnosis: moderate oropharyngeal dysphagia    Physician: Self, Aaareferral  Physician Orders: Fl Modified Barium Swallow Speech/SLP Video Swallow    Date of Evaluation:  1/30/2025    Radiation time: see chart    Precautions: Standard and Aspiration  Subjective   History of Current Condition: Linda Segura is a 74 y.o. female with a PMHx significant for cancer of tongue (2007), CVA, GERD, HTN, and mental disorder who presented to the ED s/t cough and generalized weakness. She reports intermittent globus sensation at home > w solids. She endorses eating soft and bite sized solids at baseline as she is edentulous. Pt reports she "does not like to sit up to eat," and prefers to lay in her recliner. At bedside she is appreciated w functional OM and cognitive-linguistic ability to meet acute wants/needs. Initial evaluation significant for no overt s/s of aspiration throughout and pt recommended for soft and bite sized solids (IDDSI 6) with thin liquids (IDDSI 0). ST re-consulted this AM s/t respiratory decline and difficulty swallowing medication this AM. Pt reports that 1/2 of pill taken in thin liquid (IDDSI 0), got stuck, ended up going down, and now she reports "tingling sensation in throat". She also now endorses odynophagia. Bedside CSE today significant for intermittent overt s/s of aspiration, decline from initial assessment. ST recommends MBSS to be completed for instrumental assessment of swallow safety/efficiency. Pt is also recommended to remain NPO except for medications taken crushed in puree or whole in puree at this time. ST educated pt and family on recommendations and pt agreeable to MBSS. MBSS to be completed bedside tomorrow as pt on vapotherm and unable to be completed in radiology. ST will follow to complete MBSS.     -Current diet: prior to admission " "endorses eating soft solids  -Therapy received: NA    The following observations were made:   -Mental status: Alert and Cooperative  -Factors affecting performance: no difficulties participating in the study and impairment or difficulty noted in endurance  -Feeding Method: independent in self-feeding    Respiratory Status:   -Respiratory Status: Vapotherm     Past Medical History: Linda Segura  has a past medical history of Abnormal Pap smear of vagina, Anxiety, Anxiety and depression, Cancer, CVA (cerebral vascular accident), Essential hypertension (6/12/2018), Mental disorder, Other specified hypothyroidism (2/13/2021), and Scoliosis.  Linda Segura  has a past surgical history that includes Cervical spine surgery; Knee surgery; feet (Bilateral); Hysterectomy; Oophorectomy (Bilateral); colpocleisis (04/2018); and Bladder surgery.    Medical Hx and Allergies: Review of patient's allergies indicates:  No Known Allergies    Objective     Modified Barium Swallow Study  Purpose: to evaluate anatomy and physiology of the oropharyngeal swallow, to determine effectiveness of rehabilitation strategies, and to determine diet consistency and intervention recommendations. The study was performed using the "Gold Standard" of 30 fps with as low as reasonably achievable (ALARA) exposure.     The patient was seen in radiology seated in High Purdy's position in a video imaging chair for lateral views of the larynx. The study was conducted using Varibar thin liquid (IDDSI 0), Varibar nectar liquid (IDDSI 2), Varibar pudding (IDDSI 4), and solid coated in Varibar pudding (IDDSI 7). She tolerated the procedure well.      Consistency  Presentation  Findings Strategy Attempted Rosenbeck's Penetration/Aspiration Scale (PAS)   Thin (IDDSI 0) Method: Self-fed    Volume: cup and straw sip x5,  sequential sips    Projection: lateral view Oral phase: WFL    Pharyngeal phase: Appreciated deep penetration during the swallow that did " not clear the LV and resulted in sensate aspiration after the swallow and during following swallows. Also appreciated two instances of aspiration during the swallow. Consistent mild-moderate residue remaining at the base of tongue, vallecula, posterior pharyngeal wall, and pyriform sinuses. Able to be reduced w cued sequential swallow, but not cleared.    Esophageal screen: Appreciated dilated esophagus w aerophagia, trace-mild retention in cervical esophagus, and retrograde flow.   Supraglottic swallow:   Not effective for eliminating penetration/aspiration    Chin tuck:   Not effective for consistently eliminating penetration/aspiration    Best: (5) Material enters the airway, contacts the vocal folds, and is not ejected from the airway    Worst: (7) Material enters the airway, passes below the vocal folds, and is not ejected from the trachea despite effort     Nectar thick (mildly thick/IDDSI 2) Method:Self-fed    Volume: cup and straw sip     Projection: lateral view Oral phase: WFL    Pharyngeal phase: No aspiration of nectar (IDDSI 2) consistency appreciated. Appreciated instance of transient penetration during the swallow. Consistent mild-moderate residue remaining at the base of tongue, vallecula, posterior pharyngeal wall, and pyriform sinuses. Able to be reduced w cued sequential swallow, but not cleared. Of note, pt w continued trace residue in the laryngeal vestibule and level of TVFs from Thin (IDDSI 0) liquid.      Best: (1) Material does not enter the airway    Worst: (2) Material enters the airway, remains above the vocal folds, and is ejected from the airway     Puree (extremely thick/ IDDSI 4) Method: Self-fed    Volume: tsp/tbsp bite x2    Projection: lateral view Oral phase: Piecemeal swallow    Pharyngeal phase: no penetration/aspiration of solid (IDDSI 7) consistency. Moderate residue appreciated at the vallecula, posterior pharyngeal wall, and pyriform sinuses. Of note, pt w continued trace  residue in the laryngeal vestibule and level of TVFs from Thin (IDDSI 0) liquid that results in trace sensate aspiration during the swallow.    Best: (1) Material does not enter the airway    Worst: (1) Material does not enter the airway     Solid (regular/ IDDSI 7) Method: Self-fed    Volume: bite x2     Projection: lateral view Oral phase: prolonged mastication. Pt edentulous.     Pharyngeal phase: no penetration/aspiration of solid (IDDSI 7) consistency. Moderate residue appreciated at the vallecula, posterior pharyngeal wall, and pyriform sinuses. Of note, pt w continued trace residue in the laryngeal vestibule and level of TVFs from Thin (IDDSI 0) liquid.    Esophageal screen: Pt w mild retention appreciated in proximal and distal esophagus.     Best: (1) Material does not enter the airway    Worst: (1) Material does not enter the airway     Barium tablet in Puree (IDDSI 4) Method: Self-fed    Volume: single tablet in puree    Projection: lateral view Timely oropharyngeal clearance. Mild-moderate residue remained at the vallecula requiring cued sequential swallow to reduce.          Treatment   Patient and family educated regarding results and recommendations of the evaluation. See the recommendations section below.    Education: Plan of Care, role of SLP in care, and aspiration precautions  and anatomy and physiology of swallow mechanism as it relates to MBSS findings and recommendations were discussed with the patient. ST educated pt and family on impacts of radiation fibrosis. ST educated pt and family on how to properly thicken liquid at bedside. Caregiver demonstrated accurate use. ST also extensively educated on importance of aggressive oral care. Patient expressed understanding. All questions were answered.     Assessment     Impressions: Patient presents with moderate oropharyngeal dysphagia, likely acute on chronic related to hx of tongue cancer (2007) s/p radiation, CVA, ACDF of C4-7, and generalized  weakness. Pt reports no prior ST following radiation in 2007 for HNC. Pt appreciated with consistent sensate aspiration of thin (IDDSI 0) liquid during and after the swallow throughout. Pt able to eliminate aspiration with use of nectar/mildly thick liquids (IDDSI 2). Consistent mild-moderate residue appreciated across consistencies throughout pharynx that pt was able to reduce w cued/spontaneous sequential swallow or cued liquid wash. Pt required multiple swallows per bolus and was appreciated w fatigue towards end of the study; requesting how much longer, and increase in pharyngeal residue.This will likely parallel presentation during meals. Esophageal screen was significant for dilated esophagus, aerophagia, retention, and retrograde flow throughout. Pt is recommended to follow-up outpatient w GI and may benefit from barium esophagram. Pt is recommended for a diet of nectar/mildly thick liquids (IDDSI 2) w minced and moist solids (IDDSI 5) and medications taken crushed in puree when able or whole in puree. Pt also to implement double swallow per bolus, alternate between bites/sips, and look to eat small meals more frequently throughout the day to decrease mealtime fatigue and maintain nutrition/hydration. In consideration of the Dynamic Imaging Grade of Swallowing Toxicity (DIGEST) (Ele et al, 2017), patient presents with moderate safety impairment and moderate efficiency impairment. Patient appears to be at moderate risk for aspiration related PNA in consideration of three pillars of aspiration pneumonia (Dunlevy, 2005) including oral health status, overall health/immune status, and laryngeal vestibule closure/severity of dysphagia. However, unable to assess risk related to aspiration pneumonia cause by the aspiration of gastric content. Patient appears to be a fair candidate for behavioral swallow rehabilitation.     Functional Oral Intake Scale (FOIS)  The Functional Oral Intake Scale (FOIS) is an  ordinal scale that is used to assess the current status and meaningful change in the oral intake. FOIS levels include:    TUBE DEPENDENT (levels 1-3) 1. No oral intake  2. Tube dependent with minimal/inconsistent oral intake  3. Tube supplements with consistent oral intake      TOTAL ORAL INTAKE (levels 4-7) 4. Total oral intake of a single consistency  5. Total oral intake of multiple consistencies requiring special preparation  6. Total oral intake with no special preparation, but must avoid specific foods or liquid items  7. Total oral intake with no restrictions     Patient is currently judged to be at FOIS level 5.      References:  JANETTE Escoto (2005, March). Pneumonia: Factors Beyond Aspiration. Perspectives in Swallowing and Swallowing Disorders (Dysphagia), 14, 10-16.  Isabell KA, Stephanie MP, Landon DA, Kendrick SANCHEZK, Krystyna HY, Fidencio RS, Myah CD, Barry SY, Jesus CP, Kaylie J, Lazarus CL, May A, Lindsey J, Bogdan JW, Olivia HM, Yu JS. Dynamic Imaging Grade of Swallowing Toxicity (DIGEST): Scale development and validation. Cancer. 2017 Jan 1;123(1):62-70. doi: 10.1002/cncr.70475. Epub 2016 Aug 26. PMID: 71725571; PMCID: NSJ6884971.    Recommendations:     Consistency Recommendations: Nectar/Mildly thick liquids (IDDSI 2) and Minced and moist consistencies (IDDSI 5).  Medications should be taken whole in puree and crushed (when possible) in puree.  Thicken all liquids to Nectar/Mildly thick thickness including liquid on food (soups, milk on cereal, etc) and liquid medications. Avoid dry, crumbly foods.  Risk Management/Swallow Guidelines: use good oral hygiene , sit upright for all PO intake, increase physical mobility as tolerated, behavioral reflux precautions, alternate bites and sips, small bites and sips, multiple swallows per bolus, and eat small meals throughout the day to reduce discomfort associated with delayed emptying of the esophagus  Specialist Referrals: GI  Ancillary Tests: Consider Barium  Esophagram   Therapy: Dysphagia therapy is recommended including Chin Tuck Against Resistance (CTAR), Mendelsohn, and effortful swallow.  Follow-up exam: Follow up swallow study is not indicated at this time. and Follow up instrumental assessment of the swallow should be completed at the recommendation of the treating clinician.     Matilda Parsons MA, PL-SLP, CCC-SLP  Speech Language Pathologist  1/24/2025

## 2025-01-30 NOTE — PT/OT/SLP PROGRESS
Occupational Therapy   Treatment    Name: Linda Segura  MRN: [id number]  Admitting Diagnosis:  Multifocal pneumonia       Recommendations:     Discharge Recommendations: Low Intensity Therapy  Discharge Equipment Recommendations:  none  Barriers to discharge:  None    Assessment:     Linda Segura is a 74 y.o. female with a medical diagnosis of Multifocal pneumonia.  Performance deficits affecting function are weakness, gait instability, decreased upper extremity function, impaired cardiopulmonary response to activity, decreased lower extremity function, impaired balance, impaired endurance, decreased safety awareness, impaired self care skills, impaired functional mobility.     Rehab Prognosis:  Good; patient would benefit from acute skilled OT services to address these deficits and reach maximum level of function.       Plan:     Patient to be seen 2 x/week to address the above listed problems via self-care/home management, therapeutic exercises, therapeutic activities  Plan of Care Expires: 02/10/25  Plan of Care Reviewed with: patient, family    Subjective     Chief Complaint: None  Patient/Family Comments/goals: increase independence  Pain/Comfort:  Pain Rating 1: 0/10  Pain Rating Post-Intervention 1: 0/10    Objective:     Communicated with: Nurse prior to session.  Patient found supine with telemetry, peripheral IV, oxygen, pulse ox (continuous) (vapotherm) upon OT entry to room.    General Precautions: Standard, fall, respiratory    Orthopedic Precautions:N/A  Braces: N/A  Respiratory Status:  vapotherm     Occupational Performance:     Bed Mobility:    Patient completed Rolling/Turning to Left with  stand by assistance  Patient completed Scooting/Bridging with stand by assistance  Patient completed Supine to Sit with stand by assistance     Functional Mobility/Transfers:  Patient completed Sit <> Stand Transfer with minimum assistance  with  no assistive device   Patient completed Bed <> Chair  Transfer using Stand Pivot technique with minimum assistance with no assistive device  Functional Mobility: Pt then stood with RW CGA from bedside chair and ambulated x4 feet with RW to reposition for swallow study    Activities of Daily Living:  Lower Body Dressing: contact guard assistance donning hospital socks seated EOB with figure 4 technique, one posterior LOB but able to correct      AMPAC 6 Click ADL: 18    Treatment & Education:  Pt participated well and remains motivated. Pt required cues for hand placement with sit to stand transfers.  Encouraged patient to stay seated up in chair to further improve CV endurance needed for daily activities. Pt verbalized understanding.  Pt left with multiple staff members present for swallow study.    Patient left up in chair with all lines intact, call button in reach, and staff memebrs present    GOALS:   Multidisciplinary Problems       Occupational Therapy Goals          Problem: Occupational Therapy    Goal Priority Disciplines Outcome Interventions   Occupational Therapy Goal     OT, PT/OT Progressing    Description: Goals to be met by: 2/10/25     Patient will increase functional independence with ADLs by performing:    Toileting from toilet with [place] for hygiene and clothing management.   Toilet transfer to toilet with [place]der.                           Time Tracking:     OT Date of Treatment: 01/30/25  OT Start Time: 0905  OT Stop Time: 0930  OT Total Time (min): 25 min    Billable Minutes:Therapeutic Activity 25    [place] [place]  OT/ADARSH: OT     Number of ADARSH visits since last OT visit: 0    1/30/2025

## 2025-01-30 NOTE — PLAN OF CARE
"Chief Complaint  Follow-up (8 week f/u)    Subjective          Tiera Abbott presents to [facility]  Follow-up left cervical radiculopathy as well as mid back low back pain radiates down her legs bilateral more so on the left chronically she is finished physical therapy continues to exercises and anti-inflammatories well without improvement pains been going on for some time,  No weakness bowel or bladder incontinence or saddlebag paresthesias.        Objective   Vital Signs:  /80   Pulse 75   Temp 97.4 °F (36.3 °C) (Infrared)   Resp 12   Ht 177.8 cm (70\")   Wt 89.8 kg (197 lb 14.4 oz)   SpO2 98%   BMI 28.40 kg/m²         Physical Exam  Vitals reviewed.   Constitutional:       General: She is not in acute distress.     Appearance: Normal appearance. She is not ill-appearing, toxic-appearing or diaphoretic.   Eyes:      Pupils: Pupils are equal, round, and reactive to light.   Musculoskeletal:      Comments: Mildly limited range of motion with pain increased with rotation to the left down her neck to her trap with some tenderness in her trap region.  No upper extremity weakness neurovascular intact, gait is normal no focal weakness, no lower lumbar tenderness.  No leg swelling or localized tenderness as well.  No weakness lower extremities   Skin:     General: Skin is warm and dry.      Capillary Refill: Capillary refill takes less than 2 seconds.   Neurological:      General: No focal deficit present.      Mental Status: She is alert and oriented to person, place, and time.      Sensory: No sensory deficit.      Motor: No weakness.      Coordination: Coordination normal.      Gait: Gait normal.      Deep Tendon Reflexes: Reflexes normal.   Psychiatric:         Mood and Affect: Mood normal.         Behavior: Behavior normal.         Thought Content: Thought content normal.         Judgment: Judgment normal.        Result Review :                 Assessment and Plan  " A236/A236 HARDY Segura is a 74 y.o.female admitted on 1/24/2025 for Multifocal pneumonia   Code Status: Full Code MRN: [id number]   Review of patient's allergies indicates:  No Known Allergies  Past Medical History:   Diagnosis Date    Abnormal Pap smear of vagina     Anxiety     Anxiety and depression     Cancer     tongue    CVA (cerebral vascular accident)     Essential hypertension 6/12/2018    Mental disorder     Other specified hypothyroidism 2/13/2021    Scoliosis       PRN meds    acetaminophen, 650 mg, Q6H PRN  acetaminophen, 650 mg, Q6H PRN  ALPRAZolam, 0.5 mg, BID PRN  aluminum-magnesium hydroxide-simethicone, 30 mL, QID PRN  dextrose 50%, 12.5 g, PRN  dextrose 50%, 25 g, PRN  glucagon (human recombinant), 1 mg, PRN  glucose, 16 g, PRN  glucose, 24 g, PRN  hydrALAZINE, 10 mg, Q6H PRN  melatonin, 6 mg, Nightly PRN  naloxone, 0.02 mg, PRN  ondansetron, 4 mg, Q8H PRN  polyethylene glycol, 17 g, Daily PRN  promethazine, 25 mg, Q6H PRN  simethicone, 1 tablet, QID PRN  sodium chloride 0.9%, 3 mL, Q12H PRN      Chart check completed. Will continue plan of care.      Orientation: oriented x 4  [place] Coma Scale Score: 15     Lead Monitored: Lead II Rhythm: normal sinus rhythm    Cardiac/Telemetry Box Number: [id number]  VTE Core Measure: Pharmacological prophylaxis initiated/maintained Last Bowel Movement: 01/27/25  Diet Adult Regular Soft & Bite Sized (IDDSI Level 6)  Voiding Characteristics: external catheter  Keron Score: 19  Fall Risk Score: 21  Accucheck []   Freq?      Lines/Drains/Airways       Peripheral Intravenous Line  Duration                  Peripheral IV - Single Lumen 01/29/25 0925 22 G No Right Antecubital <1 day                          Diagnoses and all orders for this visit:    1. Cervical radiculopathy (Primary)  -     MRI cervical spine wo contrast  -     MRI Lumbar Spine Without Contrast    2. Lumbar radiculopathy  -     MRI cervical spine wo contrast  -     MRI Lumbar Spine Without Contrast    3. Post-menopausal  -     DEXA Bone Density Axial    4. Encounter for screening mammogram for breast cancer  -     Mammo Screening Bilateral With CAD           I spent 30  minutes caring for Tiera on this date of service. This time includes time spent by me in the following activities:preparing for the visit, reviewing tests, obtaining and/or reviewing a separately obtained history, performing a medically appropriate examination and/or evaluation , counseling and educating the patient/family/caregiver, ordering medications, tests, or procedures, documenting information in the medical record and care coordination  Follow Up   No follow-ups on file.  Patient was given instructions and counseling regarding her condition or for health maintenance advice. Please see specific information pulled into the AVS if appropriate.     Discharge instructions    Continue physical therapy exercises at home, ergonomics, watch the way you get up sit and walk carefully avoid the pain work around the pain but continued increased the flexibility of your spinal ligaments, and strengthen your muscles as well.    You very well may have some scoliosis as discussed I agree otherwise this may be simply from muscle spasm due to chronic pain and chronic muscle flexion.  Continue stretching here as well.  If severe pain uncontrolled weakness bowel or bladder incontinence or retention [facility] paresthesia emergency room    Call me for results of your MRIs to discuss the next ney

## 2025-01-30 NOTE — PLAN OF CARE
A236/A236 HARDY Segura is a 74 y.o.female admitted on 1/24/2025 for Multifocal pneumonia   Code Status: Full Code MRN: [id number]   Review of patient's allergies indicates:  No Known Allergies  Past Medical History:   Diagnosis Date    Abnormal Pap smear of vagina     Anxiety     Anxiety and depression     Cancer     tongue    CVA (cerebral vascular accident)     Essential hypertension 6/12/2018    Mental disorder     Other specified hypothyroidism 2/13/2021    Scoliosis       PRN meds    acetaminophen, 650 mg, Q6H PRN  acetaminophen, 650 mg, Q6H PRN  ALPRAZolam, 0.5 mg, BID PRN  aluminum-magnesium hydroxide-simethicone, 30 mL, QID PRN  dextrose 50%, 12.5 g, PRN  dextrose 50%, 25 g, PRN  glucagon (human recombinant), 1 mg, PRN  glucose, 16 g, PRN  glucose, 24 g, PRN  hydrALAZINE, 10 mg, Q6H PRN  melatonin, 6 mg, Nightly PRN  naloxone, 0.02 mg, PRN  ondansetron, 4 mg, Q8H PRN  polyethylene glycol, 17 g, Daily PRN  promethazine, 25 mg, Q6H PRN  simethicone, 1 tablet, QID PRN  sodium chloride 0.9%, 3 mL, Q12H PRN      Chart check completed. Will continue plan of care.      Orientation: oriented x 4  [place] Coma Scale Score: 15     Lead Monitored: Lead II Rhythm: normal sinus rhythm    Cardiac/Telemetry Box Number: [id number]  VTE Core Measure: Pharmacological prophylaxis initiated/maintained Last Bowel Movement: 01/27/25  Diet Minced & Moist (IDDSI Level 5) Nectar Thick (Mildly Thick); Standard Tray  Voiding Characteristics: incontinence  Keron Score: 19  Fall Risk Score: 19  Accucheck []   Freq?      Lines/Drains/Airways       Peripheral Intravenous Line  Duration                  Peripheral IV - Single Lumen 01/29/25 0925 22 G Right Antecubital 1 day

## 2025-01-31 LAB
ALBUMIN SERPL BCP-MCNC: 2.3 G/DL (ref 3.5–5.2)
ALP SERPL-CCNC: 107 U/L (ref 40–150)
ALT SERPL W/O P-5'-P-CCNC: 122 U/L (ref 10–44)
ANION GAP SERPL CALC-SCNC: 8 MMOL/L (ref 8–16)
ASPERGILLUS AB SER QL ID: NOT DETECTED
AST SERPL-CCNC: 57 U/L (ref 10–40)
B DERMAT AB SER QL ID: NOT DETECTED
BASOPHILS # BLD AUTO: 0.04 K/UL (ref 0–0.2)
BASOPHILS NFR BLD: 0.4 % (ref 0–1.9)
BILIRUB SERPL-MCNC: 0.5 MG/DL (ref 0.1–1)
BUN SERPL-MCNC: 23 MG/DL (ref 8–23)
C IMMITIS AB SER QL ID: NOT DETECTED
CALCIUM SERPL-MCNC: 9.5 MG/DL (ref 8.7–10.5)
CHLORIDE SERPL-SCNC: 103 MMOL/L (ref 95–110)
CO2 SERPL-SCNC: 27 MMOL/L (ref 23–29)
CREAT SERPL-MCNC: 0.6 MG/DL (ref 0.5–1.4)
DIFFERENTIAL METHOD BLD: ABNORMAL
EOSINOPHIL # BLD AUTO: 0 K/UL (ref 0–0.5)
EOSINOPHIL NFR BLD: 0 % (ref 0–8)
ERYTHROCYTE [DISTWIDTH] IN BLOOD BY AUTOMATED COUNT: 13 % (ref 11.5–14.5)
EST. GFR  (NO RACE VARIABLE): >60 ML/MIN/1.73 M^2
GLUCOSE SERPL-MCNC: 133 MG/DL (ref 70–110)
H CAPSUL AB SER QL ID: NOT DETECTED
HCT VFR BLD AUTO: 41.9 % (ref 37–48.5)
HGB BLD-MCNC: 13.5 G/DL (ref 12–16)
IMM GRANULOCYTES # BLD AUTO: 0.5 K/UL (ref 0–0.04)
IMM GRANULOCYTES NFR BLD AUTO: 4.8 % (ref 0–0.5)
LYMPHOCYTES # BLD AUTO: 0.5 K/UL (ref 1–4.8)
LYMPHOCYTES NFR BLD: 5.2 % (ref 18–48)
MAGNESIUM SERPL-MCNC: 2.3 MG/DL (ref 1.6–2.6)
MCH RBC QN AUTO: 29.2 PG (ref 27–31)
MCHC RBC AUTO-ENTMCNC: 32.2 G/DL (ref 32–36)
MCV RBC AUTO: 91 FL (ref 82–98)
MONOCYTES # BLD AUTO: 0.3 K/UL (ref 0.3–1)
MONOCYTES NFR BLD: 3.2 % (ref 4–15)
NEUTROPHILS # BLD AUTO: 9.1 K/UL (ref 1.8–7.7)
NEUTROPHILS NFR BLD: 86.4 % (ref 38–73)
NRBC BLD-RTO: 0 /100 WBC
PHOSPHATE SERPL-MCNC: 3.9 MG/DL (ref 2.7–4.5)
PLATELET # BLD AUTO: 241 K/UL (ref 150–450)
PMV BLD AUTO: 10.2 FL (ref 9.2–12.9)
POTASSIUM SERPL-SCNC: 5 MMOL/L (ref 3.5–5.1)
PROT SERPL-MCNC: 5.5 G/DL (ref 6–8.4)
RBC # BLD AUTO: 4.62 M/UL (ref 4–5.4)
SODIUM SERPL-SCNC: 138 MMOL/L (ref 136–145)
WBC # BLD AUTO: 10.46 K/UL (ref 3.9–12.7)

## 2025-01-31 PROCEDURE — 25000003 PHARM REV CODE 250: Performed by: FAMILY MEDICINE

## 2025-01-31 PROCEDURE — 97116 GAIT TRAINING THERAPY: CPT

## 2025-01-31 PROCEDURE — 99900035 HC TECH TIME PER 15 MIN (STAT)

## 2025-01-31 PROCEDURE — 94799 UNLISTED PULMONARY SVC/PX: CPT

## 2025-01-31 PROCEDURE — 83735 ASSAY OF MAGNESIUM: CPT | Performed by: NURSE PRACTITIONER

## 2025-01-31 PROCEDURE — 85025 COMPLETE CBC W/AUTO DIFF WBC: CPT | Performed by: NURSE PRACTITIONER

## 2025-01-31 PROCEDURE — 21400001 HC TELEMETRY ROOM

## 2025-01-31 PROCEDURE — 94664 DEMO&/EVAL PT USE INHALER: CPT

## 2025-01-31 PROCEDURE — 25000003 PHARM REV CODE 250: Performed by: NURSE PRACTITIONER

## 2025-01-31 PROCEDURE — 25000242 PHARM REV CODE 250 ALT 637 W/ HCPCS: Performed by: FAMILY MEDICINE

## 2025-01-31 PROCEDURE — 80053 COMPREHEN METABOLIC PANEL: CPT | Performed by: NURSE PRACTITIONER

## 2025-01-31 PROCEDURE — 27000207 HC ISOLATION

## 2025-01-31 PROCEDURE — 27100171 HC OXYGEN HIGH FLOW UP TO 24 HOURS

## 2025-01-31 PROCEDURE — 94640 AIRWAY INHALATION TREATMENT: CPT

## 2025-01-31 PROCEDURE — 97110 THERAPEUTIC EXERCISES: CPT

## 2025-01-31 PROCEDURE — 97535 SELF CARE MNGMENT TRAINING: CPT

## 2025-01-31 PROCEDURE — 36415 COLL VENOUS BLD VENIPUNCTURE: CPT | Performed by: NURSE PRACTITIONER

## 2025-01-31 PROCEDURE — 94761 N-INVAS EAR/PLS OXIMETRY MLT: CPT

## 2025-01-31 PROCEDURE — 63600175 PHARM REV CODE 636 W HCPCS: Mod: JZ,TB | Performed by: FAMILY MEDICINE

## 2025-01-31 PROCEDURE — 92526 ORAL FUNCTION THERAPY: CPT

## 2025-01-31 PROCEDURE — 97530 THERAPEUTIC ACTIVITIES: CPT

## 2025-01-31 PROCEDURE — 63600175 PHARM REV CODE 636 W HCPCS: Performed by: NURSE PRACTITIONER

## 2025-01-31 PROCEDURE — 84100 ASSAY OF PHOSPHORUS: CPT | Performed by: NURSE PRACTITIONER

## 2025-01-31 PROCEDURE — 27000249 HC VAPOTHERM CIRCUIT

## 2025-01-31 RX ORDER — PREDNISONE 20 MG/1
20 TABLET ORAL 2 TIMES DAILY
Status: DISCONTINUED | OUTPATIENT
Start: 2025-01-31 | End: 2025-02-01 | Stop reason: HOSPADM

## 2025-01-31 RX ADMIN — PANTOPRAZOLE SODIUM 40 MG: 40 TABLET, DELAYED RELEASE ORAL at 09:01

## 2025-01-31 RX ADMIN — Medication 400 ML: at 06:01

## 2025-01-31 RX ADMIN — BUDESONIDE INHALATION 0.5 MG: 0.5 SUSPENSION RESPIRATORY (INHALATION) at 07:01

## 2025-01-31 RX ADMIN — DOXYCYCLINE HYCLATE 100 MG: 100 TABLET, COATED ORAL at 09:01

## 2025-01-31 RX ADMIN — ALBUTEROL SULFATE 2.5 MG: 0.83 SOLUTION RESPIRATORY (INHALATION) at 02:01

## 2025-01-31 RX ADMIN — ATORVASTATIN CALCIUM 10 MG: 10 TABLET, FILM COATED ORAL at 09:01

## 2025-01-31 RX ADMIN — ALBUTEROL SULFATE 2.5 MG: 0.83 SOLUTION RESPIRATORY (INHALATION) at 08:01

## 2025-01-31 RX ADMIN — Medication 400 ML: at 01:01

## 2025-01-31 RX ADMIN — PREDNISONE 20 MG: 20 TABLET ORAL at 05:01

## 2025-01-31 RX ADMIN — CEFTRIAXONE 1 G: 1 INJECTION, POWDER, FOR SOLUTION INTRAMUSCULAR; INTRAVENOUS at 01:01

## 2025-01-31 RX ADMIN — SERTRALINE HYDROCHLORIDE 100 MG: 50 TABLET ORAL at 09:01

## 2025-01-31 RX ADMIN — METOPROLOL SUCCINATE 50 MG: 50 TABLET, EXTENDED RELEASE ORAL at 09:01

## 2025-01-31 RX ADMIN — Medication 400 ML: at 11:01

## 2025-01-31 RX ADMIN — GUAIFENESIN 600 MG: 600 TABLET, EXTENDED RELEASE ORAL at 09:01

## 2025-01-31 RX ADMIN — METHYLPREDNISOLONE SODIUM SUCCINATE 40 MG: 40 INJECTION, POWDER, FOR SOLUTION INTRAMUSCULAR; INTRAVENOUS at 09:01

## 2025-01-31 RX ADMIN — BUDESONIDE INHALATION 0.5 MG: 0.5 SUSPENSION RESPIRATORY (INHALATION) at 08:01

## 2025-01-31 RX ADMIN — POTASSIUM CHLORIDE 40 MEQ: 1500 TABLET, EXTENDED RELEASE ORAL at 09:01

## 2025-01-31 RX ADMIN — LEVOTHYROXINE SODIUM 50 MCG: 0.05 TABLET ORAL at 06:01

## 2025-01-31 RX ADMIN — ALBUTEROL SULFATE 2.5 MG: 0.83 SOLUTION RESPIRATORY (INHALATION) at 07:01

## 2025-01-31 RX ADMIN — Medication 400 ML: at 09:01

## 2025-01-31 RX ADMIN — ASPIRIN 81 MG: 81 TABLET, COATED ORAL at 09:01

## 2025-01-31 RX ADMIN — ENOXAPARIN SODIUM 40 MG: 40 INJECTION SUBCUTANEOUS at 05:01

## 2025-01-31 RX ADMIN — FUROSEMIDE 20 MG: 10 INJECTION, SOLUTION INTRAMUSCULAR; INTRAVENOUS at 09:01

## 2025-01-31 RX ADMIN — CLOPIDOGREL BISULFATE 75 MG: 75 TABLET ORAL at 06:01

## 2025-01-31 RX ADMIN — Medication 400 ML: at 05:01

## 2025-01-31 NOTE — PT/OT/SLP PROGRESS
"Speech Language Pathology Treatment    Patient Name:  Linda Segura   MRN:  [id number]  Admitting Diagnosis: Multifocal pneumonia    Recommendations:                 General Recommendations:  Dysphagia therapy  Diet recommendations:  Minced & Moist Diet - IDDSI Level 5, Liquid Diet Level: Mildly thick liquids - IDDSI Level 2   Aspiration Precautions: 1 bite/sip at a time, Assistance with thickening liquids, Double swallow with each bite/sip, Eliminate distractions, Frequent oral care, HOB to 90 degrees, Meds crushed in puree, Meds whole buried in puree, Small bites/sips, and Standard aspiration precautions   General Precautions: Standard, aspiration  Communication strategies:  none    Assessment:     Linda Segura is a 74 y.o. female with a PMHx significant for cancer of tongue (2007), CVA, GERD, HTN, and mental disorder who presented to the ED s/t cough and generalized weakness. She reports intermittent globus sensation at home > w solids. She endorses eating soft and bite sized solids at baseline as she is edentulous. Pt reports she "does not like to sit up to eat," and prefers to lay in her recliner. At bedside she is appreciated w functional OM and cognitive-linguistic ability to meet acute wants/needs. MBSS completed on 01/30/2025 significant for "moderate oropharyngeal dysphagia and pt recommended for diet of nectar/mildly thick liquids (IDDSI 2) w minced and moist solids (IDDSI 5) and medications taken crushed in puree when able or whole in puree. Pt also to implement double swallow per bolus, alternate between bites/sips, and look to eat small meals more frequently throughout the day to decrease fatigue over the course of a meal and maintain nutrition/hydration. Dysphagia therapy is recommended including Chin Tuck Against Resistance (CTAR), Mendelsohn, and effortful swallow."    1/31/2025- Pt seen bedside with daughter present for ST. Pt appreciated to be fatigued and reported she was "worn out" from " PT/OT this morning. Pt reported distaste for thickened liquids, but endorses compliance. ST provided ongoing education regarding MBSS findings and rationale of recommendations. Pt and daughter continued to verbalize understanding. Pt agreeable for behavioral swallowing exercises at bedside. ST introduced pt to effortful swallows, mendelsohn maneuver, and Chin Tuck Against Resistance. Pt able to implement effortful swallow w minimal cues at end of session. Additional education and trials needed for understanding of mendelsohn maneuver and CTAR. Session limited by pt fatigue. ST will continue to follow-up for ongoing dysphagia tx.     Subjective     Pt see bedside for ST; daughter present throughout  Patient goals: to rest     Pain/Comfort:  Pain Rating 1: 0/10  Pain Rating Post-Intervention 1: 0/10  Pain Rating 2: 0/10  Pain Rating Post-Intervention 2: 0/10    Respiratory Status:  vapotherm    Objective:     Has the patient been evaluated by SLP for swallowing?   Yes  Keep patient NPO? No   Current Respiratory Status:        See assessment, 1/31/2025.    Goals:   Multidisciplinary Problems       SLP Goals          Problem: SLP    Goal Priority Disciplines Outcome   SLP Goal     SLP Progressing   Description: TPW engage MBSS for instrumental assessment of swallow safety/efficiency. - MET    TPW engage in behavioral swallow exercises and demonstrate understanding of rationale/accurate execution.    TPW safely consume least restrictive PO diet.                       Plan:     Patient to be seen:  2 x/week, 3 x/week   Plan of Care expires:  02/05/25  Plan of Care reviewed with:  patient, daughter   SLP Follow-Up:  Yes       Discharge recommendations:  Low Intensity Therapy   Barriers to Discharge:  None    Time Tracking:     SLP Treatment Date:   01/31/25  Speech Start Time:  0950  Speech Stop Time:  1015     Speech Total Time (min):  25 min    Billable Minutes: Treatment Swallowing Dysfunction 20 and Self Care/Home  Management Training 5    Matilda Parsons MA, PL-SLP, CCC-SLP  Speech Language Pathologist  01/31/2025

## 2025-01-31 NOTE — PT/OT/SLP PROGRESS
Occupational Therapy   Treatment    Name: Linda Segura  MRN: [id number]  Admitting Diagnosis:  Multifocal pneumonia       Recommendations:     Discharge Recommendations: Low Intensity Therapy (with 24/7 care)  Discharge Equipment Recommendations:  none  Barriers to discharge:  None    Assessment:     Linda Segura is a 74 y.o. female with a medical diagnosis of Multifocal pneumonia.  She presents with the following performance deficits affecting function are weakness, impaired endurance, impaired self care skills, impaired functional mobility, gait instability, impaired balance, decreased upper extremity function, decreased lower extremity function, decreased safety awareness, pain, decreased ROM, impaired cardiopulmonary response to activity.     Rehab Prognosis:  Good; patient would benefit from acute skilled OT services to address these deficits and reach maximum level of function.       Plan:     Patient to be seen 2 x/week to address the above listed problems via self-care/home management, therapeutic exercises, therapeutic activities  Plan of Care Expires: 02/10/25  Plan of Care Reviewed with: patient    Subjective     Chief Complaint: fatigue  Patient/Family Comments/goals: get better, improve strength and mobility  Pain/Comfort:  Pain Rating 1: 2/10  Location - Orientation 1: generalized  Location 1: throat  Pain Addressed 1: Nurse notified  Pain Rating Post-Intervention 1: 2/10    Objective:     Communicated with: Nurse and epic chart review prior to session.  Patient found HOB elevated with peripheral IV, telemetry, oxygen, pulse ox (continuous), Other (comments) (VAPOTHERM) upon OT entry to room.    General Precautions: Standard, fall, droplet, respiratory    Orthopedic Precautions:N/A  Braces: N/A  Respiratory Status:  VAPOTHERM 20L 50%     Occupational Performance:     Bed Mobility:    Patient completed Rolling/Turning to Left with  stand by assistance  Patient completed Scooting/Bridging with  stand by assistance  Patient completed Supine to Sit with stand by assistance     Functional Mobility/Transfers:  Patient completed Sit <> Stand Transfer with contact guard assistance  with  rolling walker   Patient completed Bed <> Chair Transfer using Stand Pivot technique with contact guard assistance with rolling walker  Functional Mobility: Patient completed x10ft functional mobility with CGA and RW to increase dynamic standing balance and activity tolerance needed for ADL completion.   Quick to fatigue.  Verbal cueing for upright posture.    Activities of Daily Living:  Lower Body Dressing: maximal assistance jess socks EOB    [facility] 6 Click ADL: 18    Treatment & Education:  Pt with increased SOB with exertion; educated pt on pursed lip breathing and importance of activity pacing.   Pt performed x10 reps BUE AROM therex:  Shoulder flexion + alternating reaching task while standing with RW  Elbow flexion/ext  Digit flexion/ext  Chest press with scapular retractions  Reviewed role of OT in acute setting and benefits of participation. Educated on techniques to use to increase independence and decrease fall risk with functional transfers. Educated on importance of OOB activity and calling for A to transfer back to bed and meet needs. Encouraged completion of B UE AROM therex throughout the day to tolerance to increase functional strength and activity tolerance. Educated patient on importance of increased tolerance to upright position and direct impact on CV endurance and strength. Patient encouraged to sit up in chair for a minimum of 2 consecutive hours per day. Patient stated understanding and in agreement with POC.     Patient left up in chair with all lines intact, call button in reach, chair alarm on, and nurse present    GOALS:   Multidisciplinary Problems       Occupational Therapy Goals          Problem: Occupational Therapy    Goal Priority Disciplines Outcome Interventions   Occupational Therapy Goal      OT, PT/OT Progressing    Description: Goals to be met by: 2/10/25     Patient will increase functional independence with ADLs by performing:    Toileting from toilet with [place] for hygiene and clothing management.   Toilet transfer to toilet with [place].  Pt will perform x15 reps BUE AROM therex for strengthening and improved ROM.                       DME Justifications:  No DME recommended requiring DME justifications    Time Tracking:     OT Date of Treatment: 01/31/25  OT Start Time: 0900  OT Stop Time: 0925  OT Total Time (min): 25 min    Billable Minutes:Therapeutic Activity 15  Therapeutic Exercise 10    OT/ADARSH: OT     Number of ADARSH visits since last OT visit: 0  Carrie Sanchez OT     1/31/2025

## 2025-01-31 NOTE — PT/OT/SLP PROGRESS
Physical Therapy  Treatment    Linda Segura   MRN: [id number]   Admitting Diagnosis: Multifocal pneumonia    PT Received On: 01/31/25  PT Start Time: 0912     PT Stop Time: 0940    PT Total Time (min): 28 min       Billable Minutes:  Gait Training 10 and Therapeutic Activity 18    Treatment Type: Treatment  PT/PTA: PT     Number of PTA visits since last PT visit: 0       General Precautions: Standard, fall  Orthopedic Precautions: N/A  Braces: N/A  Respiratory Status:  vapotherm         Subjective:  Communicated with NURSE AND EPIC CHART REVIEW  prior to session.   PT AGREED TO TX.     Pain/Comfort  Pain Rating 1: 2/10  Location 1: throat  Pain Rating Post-Intervention 1: 2/10    Objective:   Patient found with: peripheral IV, telemetry, oxygen, pulse ox (continuous)    Functional Mobility:  PT LOG ROLLED TO LEFT WITH S AND SEATED EOB WITH SBA. PT SCOOTED TO EOB WITH SBA. GT. BELT AND  SOCKS DONNED PRIOR TO OOB MOBILITY.  PT STOOD WITH RW AND CGA FOR GT TRAINING. PT GT TRAINED X 10' WITH RW AND CGA WITH VAPOTHERM IN TOW. PT T/F TO BEDSIDE FOR REST. PT STOOD FOR STANDING TOLERANCE AND COMPLETED STANDING 1 UE REACHING TASK WITH 1 UE SUPPORT ON RW AND CGA. PT COMPLETED STAND PIVOT T/F TO CHAIR WITH CGA FOR OOB TOLERANCE.     Treatment and Education:  PT COMPLETED LBE X 1 MIN AND 45 SEC WITH EMPHASIS ON CARDIOPULMONARY ENDURANCE. PT O2 SATS REMAINED @ 90% OR ABOVE DURING LBE ACTIVITY.      AM-PAC 6 CLICK MOBILITY  How much help from another person does this patient currently need?   1 = Unable, Total/Dependent Assistance  2 = A lot, Maximum/Moderate Assistance  3 = A little, Minimum/Contact Guard/Supervision  4 = None, Modified [place]/Independent    Turning over in bed (including adjusting bedclothes, sheets and blankets)?: 4  Sitting down on and standing up from a chair with arms (e.g., wheelchair, bedside commode, etc.): 3  Moving from lying on back to sitting on the side of the bed?: 4  Moving to and from  a bed to a chair (including a wheelchair)?: 3  Need to walk in hospital room?: 3  Climbing 3-5 steps with a railing?: 1  Basic Mobility Total Score: 18    AM-PAC Raw Score CMS G-Code Modifier Level of Impairment Assistance   6 % Total / Unable   7 - 9 CM 80 - 100% Maximal Assist   10 - 14 CL 60 - 80% Moderate Assist   15 - 19 CK 40 - 60% Moderate Assist   20 - 22 CJ 20 - 40% Minimal Assist   23 CI 1-20% SBA / CGA   24 CH 0% Independent/ Mod I     Patient left up in chair with call button in reach and chair alarm on.    Assessment:  PT PROGRESSING WITH GROSS FUNC MOBILITY  AND 02 SATS @ 88-94 % THROUGHOUT TX SESSION.     Rehab identified problem list/impairments: weakness, impaired endurance, impaired self care skills, impaired functional mobility, gait instability, impaired cardiopulmonary response to activity, decreased ROM, impaired balance, pain, decreased lower extremity function, decreased upper extremity function    Rehab potential is good.    Activity tolerance: Fair    Discharge recommendations: Low Intensity Therapy (will have caregiver assist)      Barriers to discharge:      Equipment recommendations: none     GOALS:   Multidisciplinary Problems       Physical Therapy Goals          Problem: Physical Therapy    Goal Priority Disciplines Outcome Interventions   Physical Therapy Goal     PT, PT/OT Progressing    Description: LT/10/25  1. PT WILL COMPLETE BED MOBILITY IND  2. PT WILL STAND T/F TO CHAIR WITH RW MOD I  3. PT WILL GT TRAIN X 150' WITH RW MOD I TO PROGRESS GT.  4. PT WILL INC AMPAC SCORE BY 2 POINTS TO PROGRESS GROSS FUNC MOBILITY.                          DME Justifications:  No DME recommended requiring DME justifications    PLAN:    Patient to be seen 3 x/week to address the above listed problems via gait training, therapeutic activities, therapeutic exercises  Plan of Care expires: 02/10/25  Plan of Care reviewed with: patient         2025

## 2025-01-31 NOTE — CONSULTS
[facility] - Telemetry ([facility])  Wound Care    Patient Name:  Linda Segura   MRN:  [id number]  Date: 1/31/2025  Diagnosis: Multifocal pneumonia    History:     Past Medical History:   Diagnosis Date    Abnormal Pap smear of vagina     Anxiety     Anxiety and depression     Cancer     tongue    CVA (cerebral vascular accident)     Essential hypertension 6/12/2018    Mental disorder     Other specified hypothyroidism 2/13/2021    Scoliosis        Social History     Socioeconomic History    Marital status:    Tobacco Use    Smoking status: Never    Smokeless tobacco: Never   Substance and Sexual Activity    Alcohol use: No     Alcohol/week: 0.0 standard drinks of alcohol    Drug use: No    Sexual activity: Not Currently     Social Drivers of Health     Financial Resource Strain: Low Risk  (1/26/2025)    Overall Financial Resource Strain (CARDIA)     Difficulty of Paying Living Expenses: Not very hard   Food Insecurity: Food Insecurity Present (1/26/2025)    Hunger Vital Sign     Worried About Running Out of Food in the Last Year: Often true     Ran Out of Food in the Last Year: Often true   Transportation Needs: No Transportation Needs (1/26/2025)    TRANSPORTATION NEEDS     Transportation : No   Physical Activity: Inactive (3/18/2024)    Received from [facility]    Exercise Vital Sign     Days of Exercise per Week: 0 days     Minutes of Exercise per Session: 0 min   Stress: Stress Concern Present (1/26/2025)    [employer] - Occupational Stress Questionnaire     Feeling of Stress : To some extent   Housing Stability: Low Risk  (1/26/2025)    Housing Stability Vital Sign     Unable to Pay for Housing in the Last Year: No     Homeless in the Last Year: No       Precautions:     Allergies as of 01/24/2025    (No Known Allergies)       [facility] Assessment Details/Treatment     Consulted on this 73 y/o F patient due to  redness to audrey-rectal skin. Patient admitted 1/24 with pneumonia and has PMH significant for  anxiety, cancer, CVA, HTN, scoliosis. She is currently on Vapotherm with rhinovirus. She is receiving IV lasix and has associated urgency and incontinence. She reports she had been using a Versette external catheter, however, is now wearing a brief. She does report brief was wet for several hours yesterday prior to skin irritation being noted by nursing staff.  Today, patient turns to right side independently.  IAD noted to audrey-rectal skin with erythema. Sacrum and bilateral buttocks otherwise intact with no redness - preventive sacral foam dressing maintained.  Cleansed affected skin with bath wipes and moisture barrier paste applied. Patient on moisture wicking incontinence pad - diaper reapplied loosly at this visit, though recommend d/c diaper and consider reapplying Versette.   Recommendations made to primary team for wound care, moisture management - avoid diapers, consider versette . Orders placed.        01/31/25 0830   WOCN Assessment   WOCN Total Time (mins) 30   Visit Date 01/31/25   Visit Time 0830   Consult Type New   WOCN Speciality Wound   Wound moisture   Continence Type Urinary   Intervention assessed;applied;chart review;coordination of care;team conference;orders   Teaching on-going;complication;discharge        Wound 01/31/25 Incontinence associated dermatitis Perirectal   Date First Assessed: 01/31/25   Present on Original Admission: Yes  Primary Wound Type: Incontinence associated dermatitis  Location: Perirectal   Wound Image     Dressing Appearance Open to air   Drainage Amount None   Appearance Red   Tissue loss description Not applicable   Red (%), Wound Tissue Color 100 %   Periwound Area Intact   Care Cleansed with:;Soap and water;Applied:;Skin Barrier  (moisture barrier paste)       01/31/2025

## 2025-01-31 NOTE — PROGRESS NOTES
[facility] ([facility])  [facility] Medicine  Progress Note    Patient Name: Linda Segura  MRN: [id number]  Patient Class: IP- Inpatient   Admission Date: 1/24/2025  Length of Stay: 7 days  Attending Physician: Hayde Medina MD  Primary Care Provider: Rolly Hammond MD        Subjective     Principal Problem:Multifocal pneumonia        HPI:  Linda Segura is a 74 y.o. female with a PMH  has a past medical history of Abnormal Pap smear of vagina, Anxiety, Anxiety and depression, Cancer, CVA (cerebral vascular accident), Essential hypertension (6/12/2018), Mental disorder, Other specified hypothyroidism (2/13/2021), and Scoliosis.  Presented to outside facility for evaluation of productive cough and generalized weakness with fever of 101 with associated chills started 2 days ago.  Denies any recent illnesses or sick contacts.  Reports minimal relief with over-the-counter medications.  Denies any chest pain, palpitations, shortness for breath, dyspnea exertion, or any other symptoms.    ER workup revealed CBC to be unremarkable.  CMP revealed potassium 2.8 with CBG of 240 mg/dL.  .  Troponin 0.029.  Initial lactic acid 3.7 with repeat lactic acid of 1.1.  Procalcitonin level 0.71.  Flu/COVID negative.  UA unremarkable.  X-ray revealed development of multilobar pneumonia.  EKG revealed normal sinus rhythm with prolonged QT with a ventricular rate of 82 beats per minute and a QT/QTC of 428/500.  Initial O2 saturation of 88% on room air with currently 96% on 4 liters/minute via nasal cannula.  Patient received 500 mg azithromycin 1 g Rocephin IV at outside facility.  Patient also received 10 mEq potassium IV and 1, 848 cc normal saline bolus.  Hospital Medicine consulted to admit patient for multifocal pneumonia.  Patient in agreement with treatment plan.  Patient admitted under inpatient status.    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   Initial lactic acid 3.7 with repeat lactic acid of 1.1. Procalcitonin level 0.71 . Echo showed normal LVEF. Respiratory infection panel positive for Rhinovirus. Blood cultures show NGTD.   Pulmonology consulted and agreed on POC. Speech consulted and noted no aspiration, although, pt did report intermittent globus sensation. Speech recommended OP GI eval.      On 1/28/25, respiratory status worsened requiring 80% Vapotherm. IV Lasix added. Mycoplasm, legionella negative, fungitell negative. Sputum cultures grew moderate candida albicans. +Mycoplasm IgG, IgM negative.   +SASKIA, elevated CRP and ESR. Complements normal. dsDNA ab normal. Pulmonology recommended Rheumatology review case. Per Dr. Pfeiffer-low suspicion for connective tissue disease based on these labs and reported symptoms. No additional medication recommendations at this point. He recommended to follow up on the SASKIA profile and reach back out him if anything in the profile is positive and at that point I can set her up for outpatient follow up (or external referral for faster Rheumatology access).     On 1/30/25, Speech recommended MBSS which showed moderate oropharyngeal dysphagia, likely acute on chronic related to hx of tongue cancer (2007) s/p radiation, CVA, ACDF of C4-7, and generalized weakness. Speech recommended Nectar/Mildly thick liquids (IDDSI 2) and Minced and moist consistencies (IDDSI 5), Medications should be taken crushed (when possible) in puree.   Vapotherm weaned to 60%- now on nasal cannula. Pt improving.     Physical/occupational therapy recommending home health     Interval History:  Afebrile, slowly improving. Vapotherm weaned  to nasal cannula. Recommended continued OOB TID    Review of Systems   Constitutional:  Positive for activity change, appetite change and fatigue.   HENT:  Positive for trouble swallowing.    Respiratory:  Positive for cough and shortness of breath.    Cardiovascular:  Negative for palpitations.   Gastrointestinal:  Negative for abdominal pain, constipation, nausea and vomiting.   Neurological:  Positive for weakness.   Psychiatric/Behavioral:  Negative for agitation, behavioral problems and confusion. The patient is not nervous/anxious.      Objective:     Vital Signs (Most Recent):  Temp: 98.4 °F (36.9 °C) (01/31/25 1122)  Pulse: 80 (01/31/25 1122)  Resp: 18 (01/31/25 1122)  BP: (!) 124/57 (01/31/25 1122)  SpO2: (!) 93 % (01/31/25 1122) Vital Signs (24h Range):  Temp:  [97.6 °F (36.4 °C)-98.8 °F (37.1 °C)] 98.4 °F (36.9 °C)  Pulse:  [66-85] 80  Resp:  [17-22] 18  SpO2:  [90 %-97 %] 93 %  BP: (108-132)/(52-69) 124/57     Weight: 72.2 kg (159 lb 2.8 oz)  Body mass index is 25.69 kg/m².    Intake/Output Summary (Last 24 hours) at 1/31/2025 1219  Last data filed at 1/31/2025 1149  Gross per 24 hour   Intake 1740 ml   Output --   Net 1740 ml         Physical Exam  Vitals and nursing note reviewed.   Constitutional:       General: She is not in acute distress.     Appearance: She is ill-appearing. She is not toxic-appearing.   HENT:      Head: Normocephalic and atraumatic.      Comments: Edentulous   Cardiovascular:      Rate and Rhythm: Normal rate and regular rhythm.   Pulmonary:      Effort: No tachypnea or respiratory distress.      Breath sounds: Rhonchi and rales present.      Comments: Weaned to nasal cannula   Abdominal:      Palpations: Abdomen is soft.      Tenderness: There is no abdominal tenderness.   Skin:     General: Skin is warm.      Coloration: Skin is pale.   Neurological:      Mental Status: She is alert and oriented to person, place, and time.      Motor: Weakness present.   Psychiatric:          "Mood and Affect: Mood normal.         Speech: Speech normal.         Behavior: Behavior is cooperative.             Significant Labs: All pertinent labs within the past 24 hours have been reviewed.  ABGs:   No results for input(s): "PH", "PCO2", "HCO3", "POCSATURATED", "BE", "TOTALHB", "COHB", "METHB", "O2HB", "POCFIO2", "PO2" in the last 48 hours.    CBC:   Recent Labs   Lab 01/30/25  0504 01/31/25  0511   WBC 11.34 10.46   HGB 12.1 13.5   HCT 37.1 41.9    241     CMP:   Recent Labs   Lab 01/30/25  0504 01/31/25  0511    138   K 4.7 5.0    103   CO2 23 27   * 133*   BUN 23 23   CREATININE 0.6 0.6   CALCIUM 9.2 9.5   PROT 5.2* 5.5*   ALBUMIN 2.2* 2.3*   BILITOT 0.4 0.5   ALKPHOS 103 107   * 57*   * 122*   ANIONGAP 10 8     Respiratory culture growing rare yeast, rare wbcs  Ginny positive   Complement within normal limits     Significant Imaging: I have reviewed all pertinent imaging results/findings within the past 24 hours.  CXR: I have reviewed all pertinent results/findings within the past 24 hours and my personal findings are:  persistent multifocal pneumonia     Assessment and Plan     * Multifocal pneumonia  Patient has a diagnosis of pneumonia. The cause of the pneumonia is viral in etiology due to  rhinovirus . The pneumonia is improving. The patient has the following signs/symptoms of pneumonia: cough, sputum production, and shortness of breath. The patient does have a current oxygen requirement and the patient does not have a home oxygen requirement. I have reviewed the pertinent imaging. The following cultures have been collected: Blood cultures The culture results are listed below.     Current antimicrobial regimen consists of the antibiotics listed below. Will monitor patient closely and continue current treatment plan unchanged.    Antibiotics (From admission, onward)      Start     Stop Route Frequency Ordered    01/25/25 2100  doxycycline tablet 100 mg         -- " Oral Every 12 hours 01/25/25 1809    01/25/25 1400  cefTRIAXone injection 1 g         -- IV Every 24 hours (non-standard times) 01/24/25 2351            Microbiology Results (last 7 days)       Procedure Component Value Units Date/Time    Culture, Respiratory with Gram Stain [[id number]]  (Abnormal) Collected: 01/27/25 1919    Order Status: Completed Specimen: Respiratory from Sputum Updated: 01/30/25 1100     Respiratory Culture No S aureus or Pseudomonas isolated.      CANDIDA ALBICANS  Moderate       Gram Stain (Respiratory) <10 epithelial cells per low power field.     Gram Stain (Respiratory) Rare WBC's     Gram Stain (Respiratory) Rare yeast    Blood culture x two cultures. Draw prior to antibiotics. [[id number]] Collected: 01/24/25 1250    Order Status: Completed Specimen: Blood from Peripheral, Forearm, Left Updated: 01/30/25 0612     Blood Culture, Routine No growth after 5 days.    Narrative:      Aerobic and anaerobic    Blood culture x two cultures. Draw prior to antibiotics. [[id number]] Collected: 01/24/25 1256    Order Status: Completed Specimen: Blood from Peripheral, Antecubital, Right Updated: 01/30/25 0612     Blood Culture, Routine No growth after 5 days.    Narrative:      Aerobic and anaerobic    Respiratory Infection Panel (PCR), Nasopharyngeal [[id number]]  (Abnormal) Collected: 01/25/25 1406    Order Status: Completed Specimen: Nasopharyngeal Swab Updated: 01/27/25 1211     Respiratory Infection Panel Source NP Swab     Adenovirus Not Detected     Coronavirus 229E, Common Cold Virus Not Detected     Coronavirus HKU1, Common Cold Virus Not Detected     Coronavirus NL63, Common Cold Virus Not Detected     Coronavirus OC43, Common Cold Virus Not Detected     Comment: The Coronavirus strains detected in this test cause the common cold.  These strains are not the COVID-19 (novel Coronavirus)strain   associated with the respiratory disease outbreak.          SARS-CoV2 (COVID-19) Qualitative PCR  Not Detected     Human Metapneumovirus Not Detected     Human Rhinovirus/Enterovirus Detected     Influenza A Not Detected     Influenza B Not Detected     Parainfluenza Virus 1 Not Detected     Parainfluenza Virus 2 Not Detected     Parainfluenza Virus 3 Not Detected     Parainfluenza Virus 4 Not Detected     Respiratory Syncytial Virus Not Detected     Bordetella Parapertussis (AX6283) Not Detected     Bordetella pertussis (ptxP) Not Detected     Chlamydia pneumoniae Not Detected     Mycoplasma pneumoniae Not Detected     Comment: Respiratory Infection Panel testing performed by Multiplex PCR.       Narrative:      Assay not valid for lower respiratory specimens, alternate  testing required.        Worsening   Abg reviewed   Blood culture negative growth to date x 4 days  On vapotherm   Add pulmicort and systemic steroids continued   Has incentive spirometer and aerobika   Physical/occupational therapy recommending homehealth physical/occupational therapy     1/29/25: On 1/28/25, respiratory status worsened requiring 80% Vapotherm. IV Lasix added. Blood cultrues show NGTD. Mycoplasm, legionella, fungitell ordered. Sputum cultures pending. +Mycoplasm IgG, IgM negative.   +SASKIA, elevated CRP and ESR. Complements normal. dsDNA ab normal. Pulmonology recommended Rheumatology review case. Per Dr. Pfeiffer-low suspicion for connective tissue disease based on these labs and reported symptoms. No additional medication recommendations at this point. He recommended to follow up on the SASKIA profile and reach back out him if anything in the profile is positive and at that point I can set her up for outpatient follow up (or external referral for faster Rheumatology access).     1/30/25: Vapotherm weaned to 60%. Legionella and fungitell negative. Sputum culture growing Oriana Albicans -no need for treatment per Pulm     1/31/25: Slowly improving- weaned to nasal cannula     Acute hypoxemic respiratory failure  Patient with Hypoxic  "Respiratory failure which is Acute.  she is not on home oxygen. Supplemental oxygen was provided and noted- Oxygen Concentration (%):  [70-85] 70  Signs/symptoms of respiratory failure include- tachypnea, increased work of breathing, and respiratory distress. Contributing diagnoses includes - Pneumonia Labs and images were reviewed. Patient Has not had a recent ABG. Will treat underlying causes and adjust management of respiratory failure as follows- continue current regimen      On vapotherm 2/2 persistent pneumonia   Treatment adjusted to include systemic steroids, IV Abx, IV lasix, and neb txs   Wean as able  Pulmonology following  1/31/25:weaned to NC    Dysphagia  Speech recommended MBSS which showed moderate oropharyngeal dysphagia, likely acute on chronic related to hx of tongue cancer (2007) s/p radiation, CVA, ACDF of C4-7, and generalized weakness. Speech recommended Nectar/Mildly thick liquids (IDDSI 2) and Minced and moist consistencies (IDDSI 5), Medications should be taken crushed (when possible) in puree.         Rhinovirus infection  IV steroids and neb txs    1/31/25: prob wean IV steroids today    Prolonged Q-T interval on ECG  Discontinue azithromycin  Add doxycycline     History of CVA (cerebrovascular accident)  -continue ASA, plavix, and lipitor  Thrombocytopenia noted   Continue plavix  Discontinue lovenox    1/29/25: thrombocytopenia resolved- will add Lovenox ppx    Hyperglycemia  Patient's FSGs are controlled on current medication regimen.  Last A1c reviewed-   Lab Results   Component Value Date    HGBA1C 5.0 01/25/2025     Most recent fingerstick glucose reviewed- No results for input(s): "POCTGLUCOSE" in the last 24 hours.    Current correctional scale  Low  Maintain anti-hyperglycemic dose as follows-   Antihyperglycemics (From admission, onward)      None        No History of diabetes.  Plan:  -SSI  -A1c  -Accu-checks  -Hypoglycemic protocol      On systemic steroids with poor po " "intake  NISS  Continue monitoring        Other specified hypothyroidism  Patient has chronic hypothyroidism. TFTs reviewed-   Lab Results   Component Value Date    TSH 6.52 (H) 03/20/2024   Continue home medication(s)   Follow up outpatient       Essential hypertension  Chronic, controlled.  Latest blood pressure and vitals reviewed-   Temp:  [97.6 °F (36.4 °C)-98.9 °F (37.2 °C)]   Pulse:  []   Resp:  [16-22]   BP: (112-191)/(53-83)   SpO2:  [77 %-100 %] .   Home meds for hypertension were reviewed and noted below.   Hypertension Medications               metoprolol succinate (TOPROL-XL) 50 MG 24 hr tablet Take 1 tablet by mouth once daily.            While in the hospital, will manage blood pressure as follows; Continue home antihypertensive regimen    Will utilize p.r.n. blood pressure medication only if patient's blood pressure greater than  180/110 and she develops symptoms such as worsening chest pain or shortness of breath.      GERD (gastroesophageal reflux disease)  Chronic. Stable. Currently asymptomatic. Home medications include PPI/Antacids as needed.  Plan:  -Continue PPI/Antacids as needed         Anxiety and depression  Chronic. Stable. Not in acute exacerbation and currently denies endorsing any suicidal/homicidal ideations.   Plan:  Received xanax last night  -Continue home medications (xanax and zoloft)    Diastolic congestive heart failure   Patient has Diastolic (HFpEF) heart failure that is Acute on chronic. On presentation their CHF was decompensated. Evidence of decompensated CHF on presentation includes: dyspnea on exertion (ARMANDO) and shortness of breath. The etiology of their decompensation is likely increased fluid intake. Most recent BNP and echo results are listed below.  No results for input(s): "BNP" in the last 72 hours.  Latest ECHO  Results for orders placed during the hospital encounter of 01/24/25    Echo    Interpretation Summary    Left Ventricle: The left ventricle is normal " in size. Ventricular mass is normal. Mildly increased wall thickness. There is concentric remodeling. There is normal systolic function with a visually estimated ejection fraction of 60 - 65%. There is normal diastolic function.    Right Ventricle: Normal right ventricular cavity size. Wall thickness is normal. Systolic function is normal.    Tricuspid Valve: There is mild regurgitation.    Pulmonary Artery: The estimated pulmonary artery systolic pressure is 39 mmHg.    IVC/SVC: Normal venous pressure at 3 mmHg.    Current Heart Failure Medications  metoprolol succinate (TOPROL-XL) 24 hr tablet 50 mg, Daily, Oral  hydrALAZINE injection 10 mg, Every 6 hours PRN, Intravenous  furosemide tablet 20 mg, Daily, Oral  furosemide (LASIX) tablet, Daily, Oral    Plan  - Monitor strict I&Os and daily weights.    - Place on telemetry  - Low sodium diet  - Place on fluid restriction of 1.5 L.   - Cardiology has not been consulted  - The patient's volume status is improving but not at their baseline as indicated by dyspnea on exertion (ARMANDO) and shortness of breath      VTE Risk Mitigation (From admission, onward)           Ordered     enoxaparin injection 40 mg  Every 24 hours         01/29/25 1204     IP VTE HIGH RISK PATIENT  Once         01/24/25 2354     Place sequential compression device  Until discontinued         01/24/25 2354                    Discharge Planning   HERNANDO:      Code Status: Full Code   Medical Readiness for Discharge Date:   Discharge Plan A: Home, Home with family                Please place Justification for DME        Esther Richardson NP  Department of Hospital Medicine   [facility] ([facility])

## 2025-01-31 NOTE — SUBJECTIVE & OBJECTIVE
Interval History:  Afebrile, slowly improving. Vapotherm weaned to nasal cannula. Recommended continued OOB TID    Review of Systems   Constitutional:  Positive for activity change, appetite change and fatigue.   HENT:  Positive for trouble swallowing.    Respiratory:  Positive for cough and shortness of breath.    Cardiovascular:  Negative for palpitations.   Gastrointestinal:  Negative for abdominal pain, constipation, nausea and vomiting.   Neurological:  Positive for weakness.   Psychiatric/Behavioral:  Negative for agitation, behavioral problems and confusion. The patient is not nervous/anxious.      Objective:     Vital Signs (Most Recent):  Temp: 98.4 °F (36.9 °C) (01/31/25 1122)  Pulse: 80 (01/31/25 1122)  Resp: 18 (01/31/25 1122)  BP: (!) 124/57 (01/31/25 1122)  SpO2: (!) 93 % (01/31/25 1122) Vital Signs (24h Range):  Temp:  [97.6 °F (36.4 °C)-98.8 °F (37.1 °C)] 98.4 °F (36.9 °C)  Pulse:  [66-85] 80  Resp:  [17-22] 18  SpO2:  [90 %-97 %] 93 %  BP: (108-132)/(52-69) 124/57     Weight: 72.2 kg (159 lb 2.8 oz)  Body mass index is 25.69 kg/m².    Intake/Output Summary (Last 24 hours) at 1/31/2025 1219  Last data filed at 1/31/2025 1149  Gross per 24 hour   Intake 1740 ml   Output --   Net 1740 ml         Physical Exam  Vitals and nursing note reviewed.   Constitutional:       General: She is not in acute distress.     Appearance: She is ill-appearing. She is not toxic-appearing.   HENT:      Head: Normocephalic and atraumatic.      Comments: Edentulous   Cardiovascular:      Rate and Rhythm: Normal rate and regular rhythm.   Pulmonary:      Effort: No tachypnea or respiratory distress.      Breath sounds: Rhonchi and rales present.      Comments: Weaned to nasal cannula   Abdominal:      Palpations: Abdomen is soft.      Tenderness: There is no abdominal tenderness.   Skin:     General: Skin is warm.      Coloration: Skin is pale.   Neurological:      Mental Status: She is alert and oriented to person, place,  "and time.      Motor: Weakness present.   Psychiatric:         Mood and Affect: Mood normal.         Speech: Speech normal.         Behavior: Behavior is cooperative.             Significant Labs: All pertinent labs within the past 24 hours have been reviewed.  ABGs:   No results for input(s): "PH", "PCO2", "HCO3", "POCSATURATED", "BE", "TOTALHB", "COHB", "METHB", "O2HB", "POCFIO2", "PO2" in the last 48 hours.    CBC:   Recent Labs   Lab 01/30/25  0504 01/31/25  0511   WBC 11.34 10.46   HGB 12.1 13.5   HCT 37.1 41.9    241     CMP:   Recent Labs   Lab 01/30/25  0504 01/31/25  0511    138   K 4.7 5.0    103   CO2 23 27   * 133*   BUN 23 23   CREATININE 0.6 0.6   CALCIUM 9.2 9.5   PROT 5.2* 5.5*   ALBUMIN 2.2* 2.3*   BILITOT 0.4 0.5   ALKPHOS 103 107   * 57*   * 122*   ANIONGAP 10 8     Respiratory culture growing rare yeast, rare wbcs  Ginny positive   Complement within normal limits     Significant Imaging: I have reviewed all pertinent imaging results/findings within the past 24 hours.  CXR: I have reviewed all pertinent results/findings within the past 24 hours and my personal findings are:  persistent multifocal pneumonia   "

## 2025-01-31 NOTE — ASSESSMENT & PLAN NOTE
Patient has a diagnosis of pneumonia. The cause of the pneumonia is viral in etiology due to  rhinovirus . The pneumonia is improving. The patient has the following signs/symptoms of pneumonia: cough, sputum production, and shortness of breath. The patient does have a current oxygen requirement and the patient does not have a home oxygen requirement. I have reviewed the pertinent imaging. The following cultures have been collected: Blood cultures The culture results are listed below.     Current antimicrobial regimen consists of the antibiotics listed below. Will monitor patient closely and continue current treatment plan unchanged.    Antibiotics (From admission, onward)      Start     Stop Route Frequency Ordered    01/25/25 2100  doxycycline tablet 100 mg         -- Oral Every 12 hours 01/25/25 1809    01/25/25 1400  cefTRIAXone injection 1 g         -- IV Every 24 hours (non-standard times) 01/24/25 2351            Microbiology Results (last 7 days)       Procedure Component Value Units Date/Time    Culture, Respiratory with Gram Stain [[id number]]  (Abnormal) Collected: 01/27/25 1919    Order Status: Completed Specimen: Respiratory from Sputum Updated: 01/30/25 1100     Respiratory Culture No S aureus or Pseudomonas isolated.      CANDIDA ALBICANS  Moderate       Gram Stain (Respiratory) <10 epithelial cells per low power field.     Gram Stain (Respiratory) Rare WBC's     Gram Stain (Respiratory) Rare yeast    Blood culture x two cultures. Draw prior to antibiotics. [[id number]] Collected: 01/24/25 1250    Order Status: Completed Specimen: Blood from Peripheral, Forearm, Left Updated: 01/30/25 0612     Blood Culture, Routine No growth after 5 days.    Narrative:      Aerobic and anaerobic    Blood culture x two cultures. Draw prior to antibiotics. [[id number]] Collected: 01/24/25 1256    Order Status: Completed Specimen: Blood from Peripheral, Antecubital, Right Updated: 01/30/25 0612     Blood Culture, Routine  No growth after 5 days.    Narrative:      Aerobic and anaerobic    Respiratory Infection Panel (PCR), Nasopharyngeal [[id number]]  (Abnormal) Collected: 01/25/25 1406    Order Status: Completed Specimen: Nasopharyngeal Swab Updated: 01/27/25 1211     Respiratory Infection Panel Source NP Swab     Adenovirus Not Detected     Coronavirus 229E, Common Cold Virus Not Detected     Coronavirus HKU1, Common Cold Virus Not Detected     Coronavirus NL63, Common Cold Virus Not Detected     Coronavirus OC43, Common Cold Virus Not Detected     Comment: The Coronavirus strains detected in this test cause the common cold.  These strains are not the COVID-19 (novel Coronavirus)strain   associated with the respiratory disease outbreak.          SARS-CoV2 (COVID-19) Qualitative PCR Not Detected     Human Metapneumovirus Not Detected     Human Rhinovirus/Enterovirus Detected     Influenza A Not Detected     Influenza B Not Detected     Parainfluenza Virus 1 Not Detected     Parainfluenza Virus 2 Not Detected     Parainfluenza Virus 3 Not Detected     Parainfluenza Virus 4 Not Detected     Respiratory Syncytial Virus Not Detected     Bordetella Parapertussis (ZS0166) Not Detected     Bordetella pertussis (ptxP) Not Detected     Chlamydia pneumoniae Not Detected     Mycoplasma pneumoniae Not Detected     Comment: Respiratory Infection Panel testing performed by Multiplex PCR.       Narrative:      Assay not valid for lower respiratory specimens, alternate  testing required.        Worsening   Abg reviewed   Blood culture negative growth to date x 4 days  On vapotherm   Add pulmicort and systemic steroids continued   Has incentive spirometer and aerobika   Physical/occupational therapy recommending homehealth physical/occupational therapy     1/29/25: On 1/28/25, respiratory status worsened requiring 80% Vapotherm. IV Lasix added. Blood cultrues show NGTD. Mycoplasm, legionella, fungitell ordered. Sputum cultures pending. +Mycoplasm  IgG, IgM negative.   +SASKIA, elevated CRP and ESR. Complements normal. dsDNA ab normal. Pulmonology recommended Rheumatology review case. Per Dr. Pfeiffer-low suspicion for connective tissue disease based on these labs and reported symptoms. No additional medication recommendations at this point. He recommended to follow up on the SASKIA profile and reach back out him if anything in the profile is positive and at that point I can set her up for outpatient follow up (or external referral for faster Rheumatology access).     1/30/25: Vapotherm weaned to 60%. Legionella and fungitell negative. Sputum culture growing Oriana Albicans -no need for treatment per Pulm     1/31/25: Slowly improving- weaned to nasal cannula

## 2025-01-31 NOTE — PLAN OF CARE
A236/A236 HARDY Segura is a 74 y.o.female admitted on 1/24/2025 for Multifocal pneumonia   Code Status: Full Code MRN: [id number]   Review of patient's allergies indicates:  No Known Allergies  Past Medical History:   Diagnosis Date    Abnormal Pap smear of vagina     Anxiety     Anxiety and depression     Cancer     tongue    CVA (cerebral vascular accident)     Essential hypertension 6/12/2018    Mental disorder     Other specified hypothyroidism 2/13/2021    Scoliosis       PRN meds    acetaminophen, 650 mg, Q6H PRN  acetaminophen, 650 mg, Q6H PRN  ALPRAZolam, 0.5 mg, BID PRN  aluminum-magnesium hydroxide-simethicone, 30 mL, QID PRN  dextrose 50%, 12.5 g, PRN  dextrose 50%, 25 g, PRN  glucagon (human recombinant), 1 mg, PRN  glucose, 16 g, PRN  glucose, 24 g, PRN  hydrALAZINE, 10 mg, Q6H PRN  melatonin, 6 mg, Nightly PRN  naloxone, 0.02 mg, PRN  ondansetron, 4 mg, Q8H PRN  polyethylene glycol, 17 g, Daily PRN  promethazine, 25 mg, Q6H PRN  simethicone, 1 tablet, QID PRN  sodium chloride 0.9%, 3 mL, Q12H PRN      Chart check completed. Will continue plan of care.      Orientation: oriented x 4  [place] Coma Scale Score: 15     Lead Monitored: Lead II Rhythm: normal sinus rhythm    Cardiac/Telemetry Box Number: [id number]  VTE Core Measure: Pharmacological prophylaxis initiated/maintained Last Bowel Movement: 01/27/25  Diet Minced & Moist (IDDSI Level 5) Nectar Thick (Mildly Thick); Standard Tray  Voiding Characteristics: incontinence  Keron Score: 19  Fall Risk Score: 17  Accucheck []   Freq?      Lines/Drains/Airways       Peripheral Intravenous Line  Duration                  Peripheral IV - Single Lumen 01/29/25 0925 22 G Right Antecubital 1 day

## 2025-02-01 VITALS
TEMPERATURE: 98 F | OXYGEN SATURATION: 95 % | SYSTOLIC BLOOD PRESSURE: 125 MMHG | BODY MASS INDEX: 24.84 KG/M2 | HEIGHT: 66 IN | DIASTOLIC BLOOD PRESSURE: 61 MMHG | HEART RATE: 85 BPM | RESPIRATION RATE: 17 BRPM | WEIGHT: 154.56 LBS

## 2025-02-01 PROBLEM — I50.33 ACUTE ON CHRONIC DIASTOLIC CONGESTIVE HEART FAILURE: Status: ACTIVE | Noted: 2025-02-01

## 2025-02-01 LAB
ALBUMIN SERPL BCP-MCNC: 2.5 G/DL (ref 3.5–5.2)
ALP SERPL-CCNC: 104 U/L (ref 40–150)
ALT SERPL W/O P-5'-P-CCNC: 108 U/L (ref 10–44)
ANION GAP SERPL CALC-SCNC: 10 MMOL/L (ref 8–16)
AST SERPL-CCNC: 38 U/L (ref 10–40)
BASOPHILS # BLD AUTO: ABNORMAL K/UL (ref 0–0.2)
BASOPHILS NFR BLD: 0 % (ref 0–1.9)
BILIRUB SERPL-MCNC: 0.4 MG/DL (ref 0.1–1)
BUN SERPL-MCNC: 30 MG/DL (ref 8–23)
CALCIUM SERPL-MCNC: 9.5 MG/DL (ref 8.7–10.5)
CHLORIDE SERPL-SCNC: 104 MMOL/L (ref 95–110)
CO2 SERPL-SCNC: 24 MMOL/L (ref 23–29)
CREAT SERPL-MCNC: 0.6 MG/DL (ref 0.5–1.4)
DIFFERENTIAL METHOD BLD: ABNORMAL
EOSINOPHIL # BLD AUTO: ABNORMAL K/UL (ref 0–0.5)
EOSINOPHIL NFR BLD: 0 % (ref 0–8)
ERYTHROCYTE [DISTWIDTH] IN BLOOD BY AUTOMATED COUNT: 13.1 % (ref 11.5–14.5)
EST. GFR  (NO RACE VARIABLE): >60 ML/MIN/1.73 M^2
GLUCOSE SERPL-MCNC: 119 MG/DL (ref 70–110)
HCT VFR BLD AUTO: 44 % (ref 37–48.5)
HGB BLD-MCNC: 13.9 G/DL (ref 12–16)
IMM GRANULOCYTES # BLD AUTO: ABNORMAL K/UL (ref 0–0.04)
IMM GRANULOCYTES NFR BLD AUTO: ABNORMAL % (ref 0–0.5)
LYMPHOCYTES # BLD AUTO: ABNORMAL K/UL (ref 1–4.8)
LYMPHOCYTES NFR BLD: 7 % (ref 18–48)
MAGNESIUM SERPL-MCNC: 2.4 MG/DL (ref 1.6–2.6)
MCH RBC QN AUTO: 28.7 PG (ref 27–31)
MCHC RBC AUTO-ENTMCNC: 31.6 G/DL (ref 32–36)
MCV RBC AUTO: 91 FL (ref 82–98)
METAMYELOCYTES NFR BLD MANUAL: 2 %
MONOCYTES # BLD AUTO: ABNORMAL K/UL (ref 0.3–1)
MONOCYTES NFR BLD: 4 % (ref 4–15)
NEUTROPHILS NFR BLD: 87 % (ref 38–73)
NRBC BLD-RTO: 0 /100 WBC
PLATELET # BLD AUTO: 275 K/UL (ref 150–450)
PLATELET BLD QL SMEAR: ABNORMAL
PMV BLD AUTO: 10.1 FL (ref 9.2–12.9)
POTASSIUM SERPL-SCNC: 4.7 MMOL/L (ref 3.5–5.1)
PROT SERPL-MCNC: 5.7 G/DL (ref 6–8.4)
RBC # BLD AUTO: 4.85 M/UL (ref 4–5.4)
SODIUM SERPL-SCNC: 138 MMOL/L (ref 136–145)
WBC # BLD AUTO: 14.09 K/UL (ref 3.9–12.7)

## 2025-02-01 PROCEDURE — 63600175 PHARM REV CODE 636 W HCPCS: Performed by: NURSE PRACTITIONER

## 2025-02-01 PROCEDURE — 27000646 HC AEROBIKA DEVICE

## 2025-02-01 PROCEDURE — 25000003 PHARM REV CODE 250: Performed by: NURSE PRACTITIONER

## 2025-02-01 PROCEDURE — 25000003 PHARM REV CODE 250: Performed by: FAMILY MEDICINE

## 2025-02-01 PROCEDURE — 85007 BL SMEAR W/DIFF WBC COUNT: CPT | Performed by: NURSE PRACTITIONER

## 2025-02-01 PROCEDURE — 94640 AIRWAY INHALATION TREATMENT: CPT

## 2025-02-01 PROCEDURE — 99900035 HC TECH TIME PER 15 MIN (STAT)

## 2025-02-01 PROCEDURE — 94761 N-INVAS EAR/PLS OXIMETRY MLT: CPT

## 2025-02-01 PROCEDURE — 94664 DEMO&/EVAL PT USE INHALER: CPT

## 2025-02-01 PROCEDURE — 83735 ASSAY OF MAGNESIUM: CPT | Performed by: NURSE PRACTITIONER

## 2025-02-01 PROCEDURE — 85027 COMPLETE CBC AUTOMATED: CPT | Performed by: NURSE PRACTITIONER

## 2025-02-01 PROCEDURE — 80053 COMPREHEN METABOLIC PANEL: CPT | Performed by: NURSE PRACTITIONER

## 2025-02-01 PROCEDURE — 25000242 PHARM REV CODE 250 ALT 637 W/ HCPCS: Performed by: FAMILY MEDICINE

## 2025-02-01 PROCEDURE — 92526 ORAL FUNCTION THERAPY: CPT

## 2025-02-01 PROCEDURE — 36415 COLL VENOUS BLD VENIPUNCTURE: CPT | Performed by: NURSE PRACTITIONER

## 2025-02-01 PROCEDURE — 27000221 HC OXYGEN, UP TO 24 HOURS

## 2025-02-01 RX ORDER — FUROSEMIDE 20 MG/1
20 TABLET ORAL DAILY
Qty: 30 TABLET | Refills: 0 | Status: SHIPPED | OUTPATIENT
Start: 2025-02-02 | End: 2026-02-02

## 2025-02-01 RX ORDER — GUAIFENESIN 600 MG/1
600 TABLET, EXTENDED RELEASE ORAL 2 TIMES DAILY PRN
Qty: 20 TABLET | Refills: 0 | Status: SHIPPED | OUTPATIENT
Start: 2025-02-01 | End: 2025-02-11

## 2025-02-01 RX ORDER — ALPRAZOLAM 1 MG/1
0.5 TABLET ORAL 2 TIMES DAILY PRN
Start: 2025-02-01

## 2025-02-01 RX ORDER — CEFDINIR 300 MG/1
300 CAPSULE ORAL 2 TIMES DAILY
Qty: 10 CAPSULE | Refills: 0 | Status: SHIPPED | OUTPATIENT
Start: 2025-02-01 | End: 2025-02-06

## 2025-02-01 RX ORDER — PREDNISONE 20 MG/1
40 TABLET ORAL DAILY
Qty: 10 TABLET | Refills: 0 | Status: SHIPPED | OUTPATIENT
Start: 2025-02-01 | End: 2025-02-06

## 2025-02-01 RX ORDER — POTASSIUM CHLORIDE 750 MG/1
10 TABLET, EXTENDED RELEASE ORAL DAILY
Qty: 30 TABLET | Refills: 0 | Status: SHIPPED | OUTPATIENT
Start: 2025-02-02

## 2025-02-01 RX ORDER — DOXYCYCLINE HYCLATE 100 MG
100 TABLET ORAL EVERY 12 HOURS
Qty: 10 TABLET | Refills: 0 | Status: SHIPPED | OUTPATIENT
Start: 2025-02-01 | End: 2025-02-06

## 2025-02-01 RX ORDER — FUROSEMIDE 20 MG/1
20 TABLET ORAL DAILY
Status: DISCONTINUED | OUTPATIENT
Start: 2025-02-01 | End: 2025-02-01 | Stop reason: HOSPADM

## 2025-02-01 RX ORDER — IPRATROPIUM BROMIDE AND ALBUTEROL SULFATE 2.5; .5 MG/3ML; MG/3ML
3 SOLUTION RESPIRATORY (INHALATION) EVERY 6 HOURS PRN
Qty: 75 ML | Refills: 0 | Status: SHIPPED | OUTPATIENT
Start: 2025-02-01 | End: 2026-02-01

## 2025-02-01 RX ADMIN — PANTOPRAZOLE SODIUM 40 MG: 40 TABLET, DELAYED RELEASE ORAL at 09:02

## 2025-02-01 RX ADMIN — LEVOTHYROXINE SODIUM 50 MCG: 0.05 TABLET ORAL at 06:02

## 2025-02-01 RX ADMIN — ALBUTEROL SULFATE 2.5 MG: 0.83 SOLUTION RESPIRATORY (INHALATION) at 02:02

## 2025-02-01 RX ADMIN — BUDESONIDE INHALATION 0.5 MG: 0.5 SUSPENSION RESPIRATORY (INHALATION) at 09:02

## 2025-02-01 RX ADMIN — FUROSEMIDE 20 MG: 20 TABLET ORAL at 09:02

## 2025-02-01 RX ADMIN — POTASSIUM CHLORIDE 40 MEQ: 1500 TABLET, EXTENDED RELEASE ORAL at 09:02

## 2025-02-01 RX ADMIN — ALBUTEROL SULFATE 2.5 MG: 0.83 SOLUTION RESPIRATORY (INHALATION) at 09:02

## 2025-02-01 RX ADMIN — GUAIFENESIN 600 MG: 600 TABLET, EXTENDED RELEASE ORAL at 09:02

## 2025-02-01 RX ADMIN — METOPROLOL SUCCINATE 50 MG: 50 TABLET, EXTENDED RELEASE ORAL at 09:02

## 2025-02-01 RX ADMIN — PREDNISONE 20 MG: 20 TABLET ORAL at 09:02

## 2025-02-01 RX ADMIN — CLOPIDOGREL BISULFATE 75 MG: 75 TABLET ORAL at 06:02

## 2025-02-01 RX ADMIN — ASPIRIN 81 MG: 81 TABLET, COATED ORAL at 09:02

## 2025-02-01 RX ADMIN — Medication 400 ML: at 09:02

## 2025-02-01 RX ADMIN — SERTRALINE HYDROCHLORIDE 100 MG: 50 TABLET ORAL at 09:02

## 2025-02-01 RX ADMIN — DOXYCYCLINE HYCLATE 100 MG: 100 TABLET, COATED ORAL at 09:02

## 2025-02-01 NOTE — PROGRESS NOTES
AVS virtually reviewed with Linda eSgura and her daughter at the bedside in its entirety with emphasis on diet, medications, follow-up appointments and reasons to return to the ED or contact the [facility] On Call Nurse Care Line. Patient also encouraged to utilize their patient portal. Ease and convenience of use reiterated. Education complete and patient voiced understanding. All questions answered. Discharge teaching complete.

## 2025-02-01 NOTE — DISCHARGE SUMMARY
[facility] ([facility])  [facility] Medicine  Discharge Summary      Patient Name: Linda Segura  MRN: [id number]  [facility]: [id number]  Patient Class: IP- Inpatient  Admission Date: 1/24/2025  Hospital Length of Stay: 8 days  Discharge Date and Time: No discharge date for patient encounter.  Attending Physician: Paige Bear,*   Discharging Provider: Esther Richardson NP  Primary Care Provider: Rolly Hammond MD    Primary Care Team: Networked reference to record PCT     HPI:   Linda Segura is a 74 y.o. female with a PMH  has a past medical history of Abnormal Pap smear of vagina, Anxiety, Anxiety and depression, Cancer, CVA (cerebral vascular accident), Essential hypertension (6/12/2018), Mental disorder, Other specified hypothyroidism (2/13/2021), and Scoliosis.  Presented to outside facility for evaluation of productive cough and generalized weakness with fever of 101 with associated chills started 2 days ago.  Denies any recent illnesses or sick contacts.  Reports minimal relief with over-the-counter medications.  Denies any chest pain, palpitations, shortness for breath, dyspnea exertion, or any other symptoms.    ER workup revealed CBC to be unremarkable.  CMP revealed potassium 2.8 with CBG of 240 mg/dL.  .  Troponin 0.029.  Initial lactic acid 3.7 with repeat lactic acid of 1.1.  Procalcitonin level 0.71.  Flu/COVID negative.  UA unremarkable.  X-ray revealed development of multilobar pneumonia.  EKG revealed normal sinus rhythm with prolonged QT with a ventricular rate of 82 beats per minute and a QT/QTC of 428/500.  Initial O2 saturation of 88% on room air with currently 96% on 4 liters/minute via nasal cannula.  Patient received 500 mg azithromycin 1 g Rocephin IV at outside facility.  Patient also received 10 mEq potassium IV and 1, 848 cc normal saline bolus.  Hospital Medicine consulted to admit patient for multifocal pneumonia.  Patient in agreement with treatment plan.   Patient admitted under inpatient status.    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   Initial lactic acid 3.7 with repeat lactic acid of 1.1. Procalcitonin level 0.71 . Echo showed normal LVEF. Respiratory infection panel positive for Rhinovirus. Blood cultures show NGTD. Pulmonology consulted and agreed on POC.    On 1/28/25, respiratory status worsened requiring 80% Vapotherm. IV Lasix added. Mycoplasm, Legionella negative, fungitell negative. Sputum cultures grew moderate candida albicans. +Mycoplasm IgG, IgM negative.   +SASKIA, elevated CRP and ESR. Complements normal. dsDNA ab normal. Pulmonology recommended Rheumatology review case. Per Dr. Pfeiffer-low suspicion for connective tissue disease based on these labs and reported symptoms. No additional medication recommendations at this point. He recommended to follow up on the SASKIA profile and reach back out him if anything in the profile is positive and at that point I can set her up for outpatient follow up (or external referral for faster Rheumatology access).   Speech recommended MBSS which showed moderate oropharyngeal dysphagia, likely acute on chronic related to hx of tongue cancer (2007) s/p radiation, CVA, ACDF of C4-7, and generalized weakness. Speech recommended Nectar/Mildly thick liquids (IDDSI 2) and Minced and moist consistencies (IDDSI 5), Medications should be taken crushed (when possible) in puree.   Vapotherm weaned to 60%- now on nasal cannula. Pt improving. Physical/occupational therapy recommended home health. The patient will be discharged on Cefdinir, Doxycycline, Prednisone, lasix 20mg daily,  and [facility] txs. Family at bedside for discharge instructions. Counseled to follow speech recommendations and f/u with PCP and Pulmonology. Home health and home oxygen arranged. The patient was seen and examined today and determined to be stable for discharge.            Goals of Care Treatment Preferences:  Code Status: Full Code         Consults:   Consults (From admission, onward)          Status Ordering Provider     Inpatient consult to Social Work  Once        Provider:  (Not yet assigned)    Acknowledged ARLEY ROLAND     Inpatient consult to Rheumatology  Once        Provider:  Mora Pfeiffer MD    Acknowledged GABI DEL VALLE     Inpatient consult to Pulmonology  Once        Provider:  Rodolfo Hong MD    Completed GABI DEL VALLE          Final Active Diagnoses:    Diagnosis Date Noted POA    PRINCIPAL PROBLEM:  Multifocal pneumonia [J18.9] 01/25/2025 Yes    Acute hypoxemic respiratory failure [J96.01] 01/26/2025 Yes    Dysphagia [R13.10] 01/30/2025 Yes    Rhinovirus infection [B34.8] 01/26/2025 Yes    Acute on chronic diastolic congestive heart failure [I50.33] 02/01/2025 Yes    Hyperglycemia [R73.9] 01/25/2025 Yes    History of CVA (cerebrovascular accident) [Z86.73] 01/25/2025 Not Applicable    Prolonged Q-T interval on ECG [R94.31] 01/25/2025 Yes    Other specified hypothyroidism [E03.8] 02/13/2021 Yes    Essential hypertension [I10] 06/12/2018 Yes     Chronic    Anxiety and depression [F41.9, F32.A] 04/30/2015 Yes    GERD (gastroesophageal reflux disease) [K21.9] 04/30/2015 Yes      Problems Resolved During this Admission:    Diagnosis Date Noted Date Resolved POA    Thrombocytopenia [D69.6] 01/26/2025 01/29/2025 Yes    Hypokalemia [E87.6] 01/25/2025 01/29/2025 Yes       Discharged Condition: stable    Disposition: Home-Health Care [facility]    Follow Up:   Follow-up Information       Rolly Hammond MD Follow up in 3 day(s).    Specialty: Internal Medicine  Contact information:  [id number]  "[facility] [address]  [place] [address]  [phone]               [facility] Follow up in 1 week(s).    Specialty: Pulmonology  Contact information:  [address]  [facility] [address] [phone]                         Patient Instructions:      OXYGEN FOR HOME USE     Order Specific Question Answer Comments   Liter Flow 4    Duration Continuous    Qualifying Test Performed at: Rest    Oxygen saturation: 86    Portable mode: continuous    Route nasal cannula    Device: home concentrator with portable tanks    Length of need (in months): 99 mos    Patient condition with qualifying saturation COPD    Height: 5' 6" (1.676 m)    Weight: 70.1 kg (154 lb 8.7 oz)    Alternative treatment measures have been tried or considered and deemed clinically ineffective. Yes      Ambulatory referral/consult to Pulmonology   Standing Status: Future   Referral Priority: Routine Referral Type: Consultation   Referral Reason: Specialty Services Required   Requested Specialty: Pulmonary Disease   Number of Visits Requested: 1     Diet Cardiac   Order Comments: Soft diet, Nectar thick liquids       Significant Diagnostic Studies:   Imaging Results              X-Ray Chest AP Portable (Final result)  Result time 01/24/25 12:53:34      Final result by Taco Kaur MD (01/24/25 12:53:34)                   Impression:      Suboptimal inspiration with mild bilateral patchy opacity described above.  Early developing multilobar pneumonia cannot be excluded.  Follow-up standard PA and lateral views of the chest would be helpful for further evaluation when feasible.      Electronically signed by: Taco Kaur  Date:    01/24/2025  Time:    12:53               Narrative:    EXAMINATION:  XR CHEST AP PORTABLE    CLINICAL HISTORY:  Sepsis;    TECHNIQUE:  Single frontal view of the chest was performed.    COMPARISON:  04/05/2018    FINDINGS:  Chest expansion is suboptimal.  Heart size is normal.  " There is mild patchy opacity which may represent early developing lung consolidation or atelectasis in the left infrahilar region and medially at the right lung base.  No free pleural fluid is visible.  Pulmonary vasculature is normal.  Postsurgical changes are again seen in the cervical spine.                                       Pending Diagnostic Studies:       None           Medications:  Reconciled Home Medications:      Medication List        START taking these medications      albuterol-ipratropium 2.5 mg-0.5 mg/3 mL nebulizer solution  Commonly known as: DUO-NEB  Take 3 mLs by nebulization every 6 (six) hours as needed for Wheezing. Rescue     cefdinir 300 MG capsule  Commonly known as: OMNICEF  Take 1 capsule (300 mg total) by mouth 2 (two) times daily. for 5 days     doxycycline 100 MG tablet  Commonly known as: VIBRA-TABS  Take 1 tablet (100 mg total) by mouth every 12 (twelve) hours. for 5 days     furosemide 20 MG tablet  Commonly known as: LASIX  Take 1 tablet (20 mg total) by mouth once daily.  Start taking on: February 2, 2025     guaiFENesin 600 mg 12 hr tablet  Commonly known as: MUCINEX  Take 1 tablet (600 mg total) by mouth 2 (two) times daily as needed for Congestion.     potassium chloride 10 MEQ Tbsr  Commonly known as: KLOR-CON  Take 1 tablet (10 mEq total) by mouth once daily.  Start taking on: February 2, 2025     predniSONE 20 MG tablet  Commonly known as: DELTASONE  Take 2 tablets (40 mg total) by mouth once daily. for 5 days            CHANGE how you take these medications      ALPRAZolam 1 MG tablet  Commonly known as: XANAX  Take 0.5 tablets (0.5 mg total) by mouth 2 (two) times daily as needed for Anxiety.  What changed: how much to take            CONTINUE taking these medications      aspirin 81 MG EC tablet  Commonly known as: ECOTRIN  Take 81 mg by mouth once daily.     atorvastatin 10 MG tablet  Commonly known as: LIPITOR  Take 1 tablet by mouth every evening.      cholecalciferol (vitamin D3) 250 mcg (10,000 unit) Cap capsule  Commonly known as: VITAMIN D3  Take 1 capsule by mouth.     clobetasol 0.05% 0.05 % Oint  Commonly known as: TEMOVATE  APPLY TOPICALLY 2 (TWO) TIMES DAILY.     clopidogreL 75 mg tablet  Commonly known as: PLAVIX  Take 1 tablet by mouth every morning.     gabapentin 100 MG capsule  Commonly known as: NEURONTIN  Take 100 mg by mouth 2 (two) times daily.     levothyroxine 50 MCG tablet  Commonly known as: SYNTHROID  Take 50 mcg by mouth.     metoprolol succinate 50 MG 24 hr tablet  Commonly known as: TOPROL-XL  Take 1 tablet by mouth once daily.     pantoprazole 40 MG tablet  Commonly known as: PROTONIX  Take 40 mg by mouth.     polyethylene glycol 17 gram Pwpk  Commonly known as: GLYCOLAX  Take by mouth.     SENNA 8.6 mg Cap  Generic drug: sennosides  Take 1 tablet by mouth daily as needed (Constipation).     sertraline 100 MG tablet  Commonly known as: ZOLOFT  Take 100 mg by mouth 2 (two) times daily.     zolpidem 10 mg Tab  Commonly known as: AMBIEN  Take 10 mg by mouth nightly as needed.            ASK your doctor about these medications      baclofen 10 MG tablet  Commonly known as: LIORESAL  Take 10 mg by mouth.              Indwelling Lines/Drains at time of discharge:   Lines/Drains/Airways       None                   Time spent on the discharge of patient: 50 minutes         Esther Richardson NP  Department of Hospital Medicine  [facility] ([facility])

## 2025-02-01 NOTE — PLAN OF CARE
02/01/25 1342   Post-Acute Status   Post-Acute Authorization HME;Home Health   HME Status Referrals Sent  ([facility] for home oxygen)   Home Health Status Referrals Sent  ([facility])   Discharge Plan   Discharge Plan A Home Health     I spoke with the patient & daughter Madeline who gave consent for equipment & home health services.  I confirmed address & phone numbers on the face sheet.

## 2025-02-01 NOTE — PT/OT/SLP PROGRESS
Speech Language Pathology Treatment    Patient Name:  Linda Segura   MRN:  [id number]  Admitting Diagnosis: Multifocal pneumonia    Recommendations:                 General Recommendations:  Dysphagia therapy  Diet recommendations:  Minced & Moist Diet - IDDSI Level 5, Liquid Diet Level: Mildly thick liquids - IDDSI Level 2   Aspiration Precautions: Alternating bites/sips, Double swallow with each bite/sip, Frequent oral care, and Strict aspiration precautions   General Precautions: Standard, aspiration  Communication strategies:  go to room if call light pushed    Assessment:     Linda Segura is a 74 y.o. female with an SLP diagnosis of Dysphagia.  Pt tolerated Dysphagia  Therapy to improve swallowing- pt completed effortful swallow x20; CTAR (x15 with small breaks); Mendelsohn x5 with max cues and TBR x20. Swallowing precautions as well as nectar thick consistency reviewed with patient and her daughter.  Types of thickener(gel vs powder) and different places to purchase were also reviewed.    Pt recommended to continue on moist/minced diet as well as Nectar Thick Liquids with swallowing precautions.    Swallowing tx. Tasks were also reviewed.  Patient being discharged today and going home with daughter and son.    Home Health S.T. recommended to continue and M.D. to write orders.  Pt recommended for repeat MBSS in few months to reassess swallowing when deemed appropriate via Home Health S.T. and MMAGDI.    Subjective     Pt sitting up in bed   Patient goals: to go home      Pain/Comfort:   0/10    Respiratory Status: Nasal cannula, flow   L/min    Objective:     Has the patient been evaluated by SLP for swallowing?    yes  Keep patient NPO?   no  Current Respiratory Status:    currently on NC    See assessment above    Goals:   Multidisciplinary Problems       SLP Goals          Problem: SLP    Goal Priority Disciplines Outcome   SLP Goal     SLP Progressing   Description: TPW engage MBSS for instrumental  assessment of swallow safety/efficiency. - MET    TPW engage in behavioral swallow exercises and demonstrate understanding of rationale/accurate execution.    TPW safely consume least restrictive PO diet.                       Plan:     Patient to be seen:  2 x/week, 3 x/week   Plan of Care expires:  02/05/25  Plan of Care reviewed with:  patient, daughter   SLP Follow-Up:  Yes       Discharge recommendations:  HOME HEALTH S.T.  Barriers to Discharge:  Level of Skilled Assistance Needed      Time Tracking:     SLP Treatment Date:    02/01/25  Speech Start Time:   0940  Speech Stop Time:     1000   Speech Total Time (min):   20 minutes    Billable Minutes: Treatment Swallowing Dysfunction 20 min    02/01/2025

## 2025-02-01 NOTE — PLAN OF CARE
A236/A236 HARDY Segura is a 74 y.o.female admitted on 1/24/2025 for Multifocal pneumonia   Code Status: Full Code MRN: [id number]   Review of patient's allergies indicates:  No Known Allergies  Past Medical History:   Diagnosis Date    Abnormal Pap smear of vagina     Anxiety     Anxiety and depression     Cancer     tongue    CVA (cerebral vascular accident)     Essential hypertension 6/12/2018    Mental disorder     Other specified hypothyroidism 2/13/2021    Scoliosis       PRN meds    acetaminophen, 650 mg, Q6H PRN  acetaminophen, 650 mg, Q6H PRN  ALPRAZolam, 0.5 mg, BID PRN  aluminum-magnesium hydroxide-simethicone, 30 mL, QID PRN  dextrose 50%, 12.5 g, PRN  dextrose 50%, 25 g, PRN  glucagon (human recombinant), 1 mg, PRN  glucose, 16 g, PRN  glucose, 24 g, PRN  hydrALAZINE, 10 mg, Q6H PRN  melatonin, 6 mg, Nightly PRN  naloxone, 0.02 mg, PRN  ondansetron, 4 mg, Q8H PRN  polyethylene glycol, 17 g, Daily PRN  promethazine, 25 mg, Q6H PRN  simethicone, 1 tablet, QID PRN  sodium chloride 0.9%, 3 mL, Q12H PRN      Chart check completed. Will continue plan of care.      Orientation: oriented x 4  [place] Coma Scale Score: 15     Lead Monitored: Lead II Rhythm: normal sinus rhythm    Cardiac/Telemetry Box Number: [id number]  VTE Core Measure: Pharmacological prophylaxis initiated/maintained Last Bowel Movement: 01/27/25  Diet Minced & Moist (IDDSI Level 5) Nectar Thick (Mildly Thick); Standard Tray  Voiding Characteristics: incontinence  Keron Score: 19  Fall Risk Score: 12  Accucheck []   Freq?      Lines/Drains/Airways       Peripheral Intravenous Line  Duration                  Peripheral IV - Single Lumen 01/29/25 0925 22 G Right Antecubital 2 days

## 2025-02-01 NOTE — RESPIRATORY THERAPY
Home Oxygen Evaluation - [facility] - Cardiopulmonary Department      Date Performed: 2/1/2025      1) Patient's Home O2 Sat on room air, while at rest: Room Air SpO2 At Rest: (!) 86 %        If O2 sats on room air at rest are 88% or below, patient qualifies.  Document O2 liter flow needed in Step 2.  If O2 sats are 89% or above, complete Step 3.        2)  If patient is not ambulated and O2 sats are <88%, what is the O2 liter flow required to meet ordered saturation?      If O2 sats on room air while exercising remain 89% or above patient does not qualify, no further testing needed Document N/A in step 3. If O2 sats on room air while exercising are 88% or below, continue to Step 4.    3) Patient's O2 Sat on room air while exercising: Room Air SpO2 During Ambulation: (!) 87 %        4) Patient's O2 Sat while exercising on O2: SpO2 During Ambulation on O2: 92 % at Ambulation O2 LPM: 4 LPM         (Must show improvement from #4 for patients to qualify)

## 2025-02-01 NOTE — DISCHARGE INSTRUCTIONS
Consistency Recommendations: Nectar/Mildly thick liquids (IDDSI 2) and Minced and moist consistencies (IDDSI 5).  Medications should be taken whole in puree and crushed (when possible) in puree.  Thicken all liquids to Nectar/Mildly thick thickness including liquid on food (soups, milk on cereal, etc) and liquid medications. Avoid dry, crumbly foods.  Risk Management/Swallow Guidelines: use good oral hygiene , sit upright for all PO intake, increase physical mobility as tolerated, behavioral reflux precautions, alternate bites and sips, small bites and sips, multiple swallows per bolus, and eat small meals throughout the day to reduce discomfort associated with delayed emptying of the esophagus  Medications should be taken crushed (when possible) in puree.     Our goal at [facility] is to always give you outstanding care and exceptional service. You may receive a survey by mail, text or e-mail in the next 7-10 days from Jean-Paul Hester and our leadership team asking about the care you received with us. The survey should only take 5-10 minutes to complete and is very important to us.     Your feedback provides us with a way to recognize our staff who work tirelessly to provide the best care! Also, your responses help us learn how to improve when your experience was below our aspiration of excellence. We WILL use your feedback to continue making improvements to help us provide the highest quality care. We keep your personal information and feedback confidential. We appreciate your time completing this survey and can't wait to hear from you!!!     We want you to leave us today feeling like you can DEFINITELY recommend us to others! We look forward to your continued care with us! Thanks so much for choosing [facility] for your healthcare needs!

## 2025-02-01 NOTE — PLAN OF CARE
[facility] - Telemetry (Hospital)  Discharge Final Note    Primary Care Provider: Rolly Hammond MD    Expected Discharge Date: 2/1/2025    Final Discharge Note (most recent)       Final Note - 02/01/25 1617          Final Note    Assessment Type Final Discharge Note (P)      Anticipated Discharge Disposition Home-Health Care Svc (P)         Post-Acute Status    Post-Acute Authorization HME (P)      HME Status Set-up Complete/Auth obtained (P)    [employer] for home oxygen to patient's residence in less than an hour.  Portable tank given to patient.    Home Health Status Set-up Complete/Auth obtained (P)    [facility] first visit will be on Monday, 2/3/2025    Discharge Delays None known at this time (P)                      Important Message from Medicare  DME, home health, & discharge orders are in & set up is complete.  No needs or discharge delays.           Contact Info       Rolly Hammond MD   Specialty: Internal Medicine   Relationship: PCP - General    [facility]  [address] [place] [address]  [facility] [place] [address]   Phone: [phone]       Next Steps: Follow up in 3 day(s)    [facility] PULMONARY MEDICINE   Specialty: Pulmonology    [address] [address] [address]   Phone: [phone]       Next Steps: Follow up in 1 week(s)

## 2025-02-01 NOTE — PLAN OF CARE
Problem: Adult Inpatient Plan of Care  Goal: Plan of Care Review  Outcome: Adequate for Care Transition  Goal: Patient-Specific Goal (Individualized)  Outcome: Adequate for Care Transition  Goal: Absence of Hospital-Acquired Illness or Injury  Outcome: Adequate for Care Transition  Goal: Optimal Comfort and Wellbeing  Outcome: Adequate for Care Transition  Goal: Readiness for Transition of Care  Outcome: Adequate for Care Transition     Problem: Pneumonia  Goal: Fluid Balance  Outcome: Adequate for Care Transition  Goal: Resolution of Infection Signs and Symptoms  Outcome: Adequate for Care Transition  Goal: Effective Oxygenation and Ventilation  Outcome: Adequate for Care Transition     Problem: Skin Injury Risk Increased  Goal: Skin Health and Integrity  Outcome: Adequate for Care Transition     Problem: Fall Injury Risk  Goal: Absence of Fall and Fall-Related Injury  Outcome: Adequate for Care Transition     Problem: Fatigue  Goal: Improved Activity Tolerance  Outcome: Adequate for Care Transition     Problem: Infection  Goal: Absence of Infection Signs and Symptoms  Outcome: Adequate for Care Transition     Problem: Airway Clearance Ineffective  Goal: Effective Airway Clearance  Outcome: Adequate for Care Transition     Problem: Wound  Goal: Optimal Coping  Outcome: Adequate for Care Transition  Goal: Optimal Functional Ability  Outcome: Adequate for Care Transition  Goal: Absence of Infection Signs and Symptoms  Outcome: Adequate for Care Transition  Goal: Improved Oral Intake  Outcome: Adequate for Care Transition  Goal: Optimal Pain Control and Function  Outcome: Adequate for Care Transition  Goal: Skin Health and Integrity  Outcome: Adequate for Care Transition  Goal: Optimal Wound Healing  Outcome: Adequate for Care Transition

## 2025-02-01 NOTE — ASSESSMENT & PLAN NOTE
"Patient has Diastolic (HFpEF) heart failure that is Acute on chronic. On presentation their CHF was decompensated. Evidence of decompensated CHF on presentation includes: dyspnea on exertion (ARMANDO) and shortness of breath. The etiology of their decompensation is likely increased fluid intake. Most recent BNP and echo results are listed below.  No results for input(s): "BNP" in the last 72 hours.  Latest ECHO  Results for orders placed during the hospital encounter of 01/24/25    Echo    Interpretation Summary    Left Ventricle: The left ventricle is normal in size. Ventricular mass is normal. Mildly increased wall thickness. There is concentric remodeling. There is normal systolic function with a visually estimated ejection fraction of 60 - 65%. There is normal diastolic function.    Right Ventricle: Normal right ventricular cavity size. Wall thickness is normal. Systolic function is normal.    Tricuspid Valve: There is mild regurgitation.    Pulmonary Artery: The estimated pulmonary artery systolic pressure is 39 mmHg.    IVC/SVC: Normal venous pressure at 3 mmHg.    Current Heart Failure Medications  metoprolol succinate (TOPROL-XL) 24 hr tablet 50 mg, Daily, Oral  hydrALAZINE injection 10 mg, Every 6 hours PRN, Intravenous  furosemide tablet 20 mg, Daily, Oral  furosemide (LASIX) tablet, Daily, Oral    Plan  - Monitor strict I&Os and daily weights.    - Place on telemetry  - Low sodium diet  - Place on fluid restriction of 1.5 L.   - Cardiology has not been consulted  - The patient's volume status is improving but not at their baseline as indicated by dyspnea on exertion (ARMANDO) and shortness of breath        "

## 2025-02-21 ENCOUNTER — OFFICE VISIT (OUTPATIENT)
Dept: PULMONOLOGY | Facility: CLINIC | Age: 75
End: 2025-02-21
Payer: MEDICARE

## 2025-02-21 VITALS
WEIGHT: 146.63 LBS | BODY MASS INDEX: 22.22 KG/M2 | RESPIRATION RATE: 21 BRPM | DIASTOLIC BLOOD PRESSURE: 82 MMHG | OXYGEN SATURATION: 97 % | SYSTOLIC BLOOD PRESSURE: 134 MMHG | HEIGHT: 68 IN | HEART RATE: 90 BPM

## 2025-02-21 DIAGNOSIS — I50.33 ACUTE ON CHRONIC DIASTOLIC CONGESTIVE HEART FAILURE: ICD-10-CM

## 2025-02-21 DIAGNOSIS — J18.9 PNEUMONIA OF BOTH LOWER LOBES DUE TO INFECTIOUS ORGANISM: ICD-10-CM

## 2025-02-21 DIAGNOSIS — R13.12 OROPHARYNGEAL DYSPHAGIA: Primary | ICD-10-CM

## 2025-02-21 DIAGNOSIS — J96.01 ACUTE HYPOXEMIC RESPIRATORY FAILURE: ICD-10-CM

## 2025-02-21 NOTE — PROGRESS NOTES
Chief complaint:  Chief Complaint   Patient presents with    Pneumonia    Hospital Follow Up        History of present illness -  HPI   Linda comes to clinic to establish care after she was seen by our colleagues in the hospital in 01/26/2025 as a pulmonology consult.  She was discharged from the hospital a few weeks ago after spending 8 days admitted for acute hypoxic respiratory failure from multifocal pneumonia.  She was initiated on empiric antibiotics with doxycycline and ceftriaxone steroids and nebulized bronchodilators, she tested positive for rhinovirus.  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  She was recommended a modified consistency diet which is still following.  She is still using her albuterol nebulizer at least 2 to 3 times a day with reported ongoing benefit.  Daughters came with the patient and served as collateral information    Tobacco use: Lifetime non-smoker  Occupational history:  shell     Physical examination -   Physical Exam  Vitals and nursing note reviewed.   Constitutional:       Appearance: Normal appearance.   HENT:      Head: Normocephalic and atraumatic.      Nose: Nose normal.      Mouth/Throat:      Mouth: Mucous membranes are moist.   Eyes:      Pupils: Pupils are equal, round, and reactive to light.   Cardiovascular:      Rate and Rhythm: Normal rate and regular rhythm.      Pulses: Normal pulses.      Heart sounds: Normal heart sounds.   Pulmonary:      Effort: Pulmonary effort is normal.      Breath sounds: Normal breath sounds.   Abdominal:      General: Abdomen is flat.      Palpations: Abdomen is soft.   Musculoskeletal:         General: No swelling.   Skin:     General: Skin is warm.      Capillary Refill:  "Capillary refill takes less than 2 seconds.   Neurological:      General: No focal deficit present.      Mental Status: She is alert.        Vitals:    02/21/25 0948   BP: 134/82   BP Location: Left arm   Patient Position: Sitting   Pulse: 90   Resp: (!) 21   SpO2: 97%   Weight: 66.5 kg (146 lb 9.7 oz)   Height: 5' 8" (1.727 m)      Review of Systems   Constitutional: Negative.    HENT: Negative.     Eyes: Negative.    Respiratory: Negative.     Cardiovascular: Negative.    Gastrointestinal: Negative.    Musculoskeletal: Negative.    Skin: Negative.    Neurological: Negative.        Relevant data (Independently reviewed by me) -    Prior notes -   Hospital notes    Imaging -   01/25/2025 scan of the chest while in the hospital showed bilateral areas of ground-glass opacities/consolidation consistent with multifocal pneumonia    Assessment & Plan    Oropharyngeal dysphagia    Pneumonia  -     Ambulatory referral/consult to Pulmonology  -     CT Chest Without Contrast; Future; Expected date: 06/25/2025    Acute hypoxemic respiratory failure  -     Ambulatory referral/consult to Pulmonology    Acute on chronic diastolic congestive heart failure  -     Ambulatory referral/consult to Pulmonology    Viral pneumonia in this elderly woman tested positive for rhino virus in the hospital developed acute respiratory failure with hypoxia secondary to lung consolidations.    Her symptoms have dramatically improved, she is close to baseline at the moment has been able to be weaned off of oxygen since leaving the hospital and is slowly regaining her energy.    I advised the patient recommendations regarding staying away from young kids who have respiratory infections which could potentially lead to another episode.    She has continued to following the recommendations of the speech therapist to prevent episodes of aspiration pneumonia as the patient has a significant oropharyngeal penetration during the last modified barium " swallow study.  Too soon to repeat chest imaging, I will wait 4 months and repeat a CT scan of the chest at that time.    RTC in 5 months    Richie Burris   02/21/2025

## 2025-03-03 ENCOUNTER — RESULTS FOLLOW-UP (OUTPATIENT)
Dept: SLEEP MEDICINE | Facility: CLINIC | Age: 75
End: 2025-03-03

## 2025-03-13 ENCOUNTER — PATIENT MESSAGE (OUTPATIENT)
Dept: RHEUMATOLOGY | Facility: CLINIC | Age: 75
End: 2025-03-13
Payer: MEDICARE

## 2025-03-13 DIAGNOSIS — R13.12 OROPHARYNGEAL DYSPHAGIA: Primary | ICD-10-CM

## 2025-03-14 ENCOUNTER — TELEPHONE (OUTPATIENT)
Dept: PULMONOLOGY | Facility: CLINIC | Age: 75
End: 2025-03-14
Payer: MEDICARE

## 2025-03-14 NOTE — TELEPHONE ENCOUNTER
----- Message from Richie Burris MD sent at 3/13/2025  7:24 AM CDT -----  [facility]  ----- Message -----  From: Yvonne Guardado LPN  Sent: 3/11/2025  12:06 PM CDT  To: Richie Burris MD      ----- Message -----  From: Amarjit Mixon  Sent: 3/11/2025  12:03 PM CDT  To: [facility] Quiroz Staff    Type:  Requesting Orders Who Called: Dariana Parsonould the patient rather a call back or a response via [facility]?  Call Best Call Back Number:  [phone] Information: Dariana is requesting  to order a swallow study for the pt.

## 2025-03-14 NOTE — TELEPHONE ENCOUNTER
Left multiple voice messages  on Rejine Deonte @ [phone] phone to try to schedule the swallow study for the patient. I will let Dariana Nichols from [facility] know once it is scheduled.

## 2025-03-14 NOTE — TELEPHONE ENCOUNTER
----- Message from Jeison sent at 3/14/2025 11:30 AM CDT -----  Contact: [facility]  Type:  Needs Medical AdviceWho Called: Dariana Nichols-Speech Therapist [facility]Would the patient rather a call back or a response via [facility]? callBest Call Back Number: [phone] Information: She is calling to the swallow study test to be preformed her at our facility

## 2025-03-28 ENCOUNTER — EXTERNAL HOME HEALTH (OUTPATIENT)
Dept: HOME HEALTH SERVICES | Facility: HOSPITAL | Age: 75
End: 2025-03-28
Payer: MEDICARE